# Patient Record
Sex: MALE | Race: WHITE | NOT HISPANIC OR LATINO
[De-identification: names, ages, dates, MRNs, and addresses within clinical notes are randomized per-mention and may not be internally consistent; named-entity substitution may affect disease eponyms.]

---

## 2020-01-10 PROBLEM — Z00.00 ENCOUNTER FOR PREVENTIVE HEALTH EXAMINATION: Status: ACTIVE | Noted: 2020-01-10

## 2020-01-31 ENCOUNTER — APPOINTMENT (OUTPATIENT)
Dept: NEUROLOGY | Facility: CLINIC | Age: 25
End: 2020-01-31
Payer: COMMERCIAL

## 2020-01-31 VITALS
BODY MASS INDEX: 21.47 KG/M2 | HEIGHT: 70 IN | TEMPERATURE: 98 F | HEART RATE: 80 BPM | SYSTOLIC BLOOD PRESSURE: 126 MMHG | DIASTOLIC BLOOD PRESSURE: 70 MMHG | OXYGEN SATURATION: 97 % | WEIGHT: 150 LBS

## 2020-01-31 PROCEDURE — 99205 OFFICE O/P NEW HI 60 MIN: CPT

## 2020-01-31 NOTE — HISTORY OF PRESENT ILLNESS
[FreeTextEntry1] : Reason for consult:  possible autoimmune encephalitis\par \par HPI: KATHY PAUL is a 24 year old man \par \par 2017 - was started on zoloft, became hypomanic, then came off zoloft. then developed manic episode and hospitalized. then started on seroquel and left hospital. then developed a lot of depression and "brain fog" and fatigue. Then went into outpatient residential program, was put on lithium for tuyet for several months, then stopped lithium in 11/2018 because of lack of efficacy but never raised to therapeutic level for bipolar. Eventually was tested for lyme in 5/2018, reportedly positive. Then referred to Scottie Loredo at [facility], was on 6 month course of doxycycline. Then saw Kenny Stanford in [place], had "galaxy testing" for lyme, was recommended switch to tetracycline, but instead stopped abx. Saw "lyme literate" psychiatrist at [facility] and was told testing was negative, was recommended disulfiram but pt opted against it.\par \par Started seeing Jordan Pollard at [facility]. Sent pt for tilt table test, EMG, and skin biopsy. Was told pt that he has small fiber peripheral neuropathy. However, pt did not have symptoms suggestive of neuropathy. MRI brain was reportedly normal. Also had brain PET which showed hypometabolism, SPECT was normal 2y ago. Serum AI panel was negative. Dr. Pollard recommended IVIG. Here for 2nd opinion. \par \par Never had EEG or LP. \par \par ROS/Current Sx:\par 10 point ROS reviewed and scanned\par \par PMHX:\par depression\par neuropathy\par \par MEDS:\par none\par \par ALL: nkda\par \par SHx: no tob, no etoh, no drugs. worked at [employer] company. was at [employer] up until 2017.\par \par FHx: uncle with bipolar, pat GM with suicide attempt, pat uncle who committed suicide\par \par Vitals: unrevealing\par \par Exam:\par AO3.  Normally conversant.  Follows commands, names, and repeats.  Good attention.\par PERRL, VFF, EOMI, no nystagmus, face symmetric, TUP at midline.\par Motor: \par                                                 R:                               L:\par Del                                           5                                5\par Bi                                              5                               5\par Tri                                            5                               5\par Wrist Extensors                      5                               5\par Finger abductors                    5                               5\par                                         5                               5 \par \par HF                                           5                               5\par KE                                           5                               5\par KF                                           5                               5\par DF                                           5                               5\par PF                                           5                               5\par \par Tone                                       R                               L\par UE                                          0                                0 \par LE                                          0                                0\par \par Sensory                                RUE                      LUE                 RLE                LLE     \par LT                                           +                            +                      +                   +\par Vib                                          +                            +                      +                   +\par JPS                                         +                            +                      +                   +\par PP                                         +                            +                      +                   +\par Temp                                     +                            +                      +                   +\par \par Reflexes:\par                                              R                             L                            \par Biceps                                  2                             2\par BR                                        2                             2\par Triceps                                2                              2\par Pat                                        2                            2 \par AJ                                        2                             2\par \par TOES                                    F                            F\par \par \par Coordination:\par                                              R                             L                       \par FTN                                       0                             0 \par LORIE                                      0                            0\par HTS                                      0                             0 \par \par Other                                                                          \par  \par \par Gait: normal, can heel, toe, and tandem\par \par                     Assistance: none\par \par 12/2019 per Dr. Pollard's notes\par MIQUEL 1:160\par lyme negative\par reportedly normal ssa/ssb, rf, B12, ESR, to, and wiil\par \par AP: 23yo w/ several years of depression, tuyet, and "brain fog". MRI brain reportedly nl. PET scan with hypometabolism. Serum ENS1 was negative per report. EMG and nerve bx by Dr. Pollard revealed e/o small fiber neuropathy, but pt denies any symptoms and his exam in the office is normal.\par \par I had a long conversation with pt that the definitive test to r/o AI encephalitis would be CSF analysis to check for pleocytosis and ENS1. However, I am not clear on the yield of this test, as based on his purely neuropsychiatric symptoms lasting years, I do not have high suspicion for AI encephalitis. He will think about whether he wants to undergo LP.\par \par I advised that he may follow up with Dr. Pollard to consider IVIG as Dr. Pollard had suggested. However, I cannot predict the benefit of such treatment.\par \par I urged him to seek a new psychiatry visit given his psychiatric symptoms.\par \par \par

## 2021-01-21 ENCOUNTER — TRANSCRIPTION ENCOUNTER (OUTPATIENT)
Age: 26
End: 2021-01-21

## 2022-06-08 ENCOUNTER — EMERGENCY (EMERGENCY)
Facility: HOSPITAL | Age: 27
LOS: 1 days | Discharge: HOME | End: 2022-06-08
Attending: EMERGENCY MEDICINE | Admitting: EMERGENCY MEDICINE
Payer: COMMERCIAL

## 2022-06-08 VITALS
OXYGEN SATURATION: 100 % | RESPIRATION RATE: 20 BRPM | TEMPERATURE: 98 F | DIASTOLIC BLOOD PRESSURE: 69 MMHG | SYSTOLIC BLOOD PRESSURE: 110 MMHG | HEART RATE: 89 BPM

## 2022-06-08 VITALS
SYSTOLIC BLOOD PRESSURE: 107 MMHG | DIASTOLIC BLOOD PRESSURE: 64 MMHG | WEIGHT: 130.95 LBS | OXYGEN SATURATION: 98 % | TEMPERATURE: 100 F | RESPIRATION RATE: 18 BRPM | HEART RATE: 115 BPM | HEIGHT: 70 IN

## 2022-06-08 DIAGNOSIS — K92.1 MELENA: ICD-10-CM

## 2022-06-08 DIAGNOSIS — R53.83 OTHER FATIGUE: ICD-10-CM

## 2022-06-08 DIAGNOSIS — M25.551 PAIN IN RIGHT HIP: ICD-10-CM

## 2022-06-08 DIAGNOSIS — K51.90 ULCERATIVE COLITIS, UNSPECIFIED, WITHOUT COMPLICATIONS: ICD-10-CM

## 2022-06-08 DIAGNOSIS — R05.9 COUGH, UNSPECIFIED: ICD-10-CM

## 2022-06-08 DIAGNOSIS — R00.0 TACHYCARDIA, UNSPECIFIED: ICD-10-CM

## 2022-06-08 DIAGNOSIS — Z20.822 CONTACT WITH AND (SUSPECTED) EXPOSURE TO COVID-19: ICD-10-CM

## 2022-06-08 DIAGNOSIS — M25.552 PAIN IN LEFT HIP: ICD-10-CM

## 2022-06-08 LAB
ALBUMIN SERPL ELPH-MCNC: 3.9 G/DL — SIGNIFICANT CHANGE UP (ref 3.3–5)
ALP SERPL-CCNC: 49 U/L — SIGNIFICANT CHANGE UP (ref 40–120)
ALT FLD-CCNC: 19 U/L — SIGNIFICANT CHANGE UP (ref 10–45)
ANION GAP SERPL CALC-SCNC: 10 MMOL/L — SIGNIFICANT CHANGE UP (ref 5–17)
APTT BLD: 28.9 SEC — SIGNIFICANT CHANGE UP (ref 27.5–35.5)
AST SERPL-CCNC: 16 U/L — SIGNIFICANT CHANGE UP (ref 10–40)
BASOPHILS # BLD AUTO: 0.03 K/UL — SIGNIFICANT CHANGE UP (ref 0–0.2)
BASOPHILS NFR BLD AUTO: 0.3 % — SIGNIFICANT CHANGE UP (ref 0–2)
BILIRUB SERPL-MCNC: 0.3 MG/DL — SIGNIFICANT CHANGE UP (ref 0.2–1.2)
BUN SERPL-MCNC: 7 MG/DL — SIGNIFICANT CHANGE UP (ref 7–23)
CALCIUM SERPL-MCNC: 9.4 MG/DL — SIGNIFICANT CHANGE UP (ref 8.4–10.5)
CHLORIDE SERPL-SCNC: 102 MMOL/L — SIGNIFICANT CHANGE UP (ref 96–108)
CO2 SERPL-SCNC: 27 MMOL/L — SIGNIFICANT CHANGE UP (ref 22–31)
CREAT SERPL-MCNC: 0.84 MG/DL — SIGNIFICANT CHANGE UP (ref 0.5–1.3)
EGFR: 123 ML/MIN/1.73M2 — SIGNIFICANT CHANGE UP
EOSINOPHIL # BLD AUTO: 0.25 K/UL — SIGNIFICANT CHANGE UP (ref 0–0.5)
EOSINOPHIL NFR BLD AUTO: 2.9 % — SIGNIFICANT CHANGE UP (ref 0–6)
FLUAV AG NPH QL: SIGNIFICANT CHANGE UP
FLUBV AG NPH QL: SIGNIFICANT CHANGE UP
GLUCOSE SERPL-MCNC: 107 MG/DL — HIGH (ref 70–99)
HCT VFR BLD CALC: 35.4 % — LOW (ref 39–50)
HGB BLD-MCNC: 11.5 G/DL — LOW (ref 13–17)
IMM GRANULOCYTES NFR BLD AUTO: 0.2 % — SIGNIFICANT CHANGE UP (ref 0–1.5)
INR BLD: 1.12 — SIGNIFICANT CHANGE UP (ref 0.88–1.16)
LACTATE SERPL-SCNC: 0.8 MMOL/L — SIGNIFICANT CHANGE UP (ref 0.5–2)
LYMPHOCYTES # BLD AUTO: 1.43 K/UL — SIGNIFICANT CHANGE UP (ref 1–3.3)
LYMPHOCYTES # BLD AUTO: 16.5 % — SIGNIFICANT CHANGE UP (ref 13–44)
MCHC RBC-ENTMCNC: 26.4 PG — LOW (ref 27–34)
MCHC RBC-ENTMCNC: 32.5 GM/DL — SIGNIFICANT CHANGE UP (ref 32–36)
MCV RBC AUTO: 81.4 FL — SIGNIFICANT CHANGE UP (ref 80–100)
MONOCYTES # BLD AUTO: 0.8 K/UL — SIGNIFICANT CHANGE UP (ref 0–0.9)
MONOCYTES NFR BLD AUTO: 9.2 % — SIGNIFICANT CHANGE UP (ref 2–14)
NEUTROPHILS # BLD AUTO: 6.12 K/UL — SIGNIFICANT CHANGE UP (ref 1.8–7.4)
NEUTROPHILS NFR BLD AUTO: 70.9 % — SIGNIFICANT CHANGE UP (ref 43–77)
NRBC # BLD: 0 /100 WBCS — SIGNIFICANT CHANGE UP (ref 0–0)
PLATELET # BLD AUTO: 357 K/UL — SIGNIFICANT CHANGE UP (ref 150–400)
POTASSIUM SERPL-MCNC: 4 MMOL/L — SIGNIFICANT CHANGE UP (ref 3.5–5.3)
POTASSIUM SERPL-SCNC: 4 MMOL/L — SIGNIFICANT CHANGE UP (ref 3.5–5.3)
PROT SERPL-MCNC: 7.1 G/DL — SIGNIFICANT CHANGE UP (ref 6–8.3)
PROTHROM AB SERPL-ACNC: 13.4 SEC — SIGNIFICANT CHANGE UP (ref 10.5–13.4)
RBC # BLD: 4.35 M/UL — SIGNIFICANT CHANGE UP (ref 4.2–5.8)
RBC # FLD: 11 % — SIGNIFICANT CHANGE UP (ref 10.3–14.5)
RSV RNA NPH QL NAA+NON-PROBE: SIGNIFICANT CHANGE UP
SARS-COV-2 RNA SPEC QL NAA+PROBE: SIGNIFICANT CHANGE UP
SODIUM SERPL-SCNC: 139 MMOL/L — SIGNIFICANT CHANGE UP (ref 135–145)
WBC # BLD: 8.65 K/UL — SIGNIFICANT CHANGE UP (ref 3.8–10.5)
WBC # FLD AUTO: 8.65 K/UL — SIGNIFICANT CHANGE UP (ref 3.8–10.5)

## 2022-06-08 PROCEDURE — 85025 COMPLETE CBC W/AUTO DIFF WBC: CPT

## 2022-06-08 PROCEDURE — 87637 SARSCOV2&INF A&B&RSV AMP PRB: CPT

## 2022-06-08 PROCEDURE — 73523 X-RAY EXAM HIPS BI 5/> VIEWS: CPT | Mod: 26

## 2022-06-08 PROCEDURE — 85730 THROMBOPLASTIN TIME PARTIAL: CPT

## 2022-06-08 PROCEDURE — 93005 ELECTROCARDIOGRAM TRACING: CPT

## 2022-06-08 PROCEDURE — 99285 EMERGENCY DEPT VISIT HI MDM: CPT | Mod: 25

## 2022-06-08 PROCEDURE — 87040 BLOOD CULTURE FOR BACTERIA: CPT

## 2022-06-08 PROCEDURE — 93010 ELECTROCARDIOGRAM REPORT: CPT

## 2022-06-08 PROCEDURE — 36415 COLL VENOUS BLD VENIPUNCTURE: CPT

## 2022-06-08 PROCEDURE — 80053 COMPREHEN METABOLIC PANEL: CPT

## 2022-06-08 PROCEDURE — 73523 X-RAY EXAM HIPS BI 5/> VIEWS: CPT

## 2022-06-08 PROCEDURE — 96360 HYDRATION IV INFUSION INIT: CPT

## 2022-06-08 PROCEDURE — 85610 PROTHROMBIN TIME: CPT

## 2022-06-08 PROCEDURE — 71045 X-RAY EXAM CHEST 1 VIEW: CPT | Mod: 26

## 2022-06-08 PROCEDURE — 71045 X-RAY EXAM CHEST 1 VIEW: CPT

## 2022-06-08 PROCEDURE — 83605 ASSAY OF LACTIC ACID: CPT

## 2022-06-08 RX ORDER — ACETAMINOPHEN 500 MG
650 TABLET ORAL ONCE
Refills: 0 | Status: COMPLETED | OUTPATIENT
Start: 2022-06-08 | End: 2022-06-08

## 2022-06-08 RX ORDER — SODIUM CHLORIDE 9 MG/ML
1000 INJECTION INTRAMUSCULAR; INTRAVENOUS; SUBCUTANEOUS ONCE
Refills: 0 | Status: COMPLETED | OUTPATIENT
Start: 2022-06-08 | End: 2022-06-08

## 2022-06-08 RX ADMIN — SODIUM CHLORIDE 1000 MILLILITER(S): 9 INJECTION INTRAMUSCULAR; INTRAVENOUS; SUBCUTANEOUS at 21:15

## 2022-06-08 RX ADMIN — Medication 650 MILLIGRAM(S): at 20:13

## 2022-06-08 RX ADMIN — Medication 650 MILLIGRAM(S): at 20:45

## 2022-06-08 RX ADMIN — SODIUM CHLORIDE 1000 MILLILITER(S): 9 INJECTION INTRAMUSCULAR; INTRAVENOUS; SUBCUTANEOUS at 20:13

## 2022-06-08 NOTE — ED ADULT NURSE NOTE - OBJECTIVE STATEMENT
27M, hx US (dx 1/22, started pm budesonide 2 weeks prior) presents with b/l hip pain for about a week. pain only present when weight-bearing. Endoring episodes of encopresis  over the last week/ Denies taking any medications for pain.

## 2022-06-08 NOTE — ED PROVIDER NOTE - PATIENT PORTAL LINK FT
You can access the FollowMyHealth Patient Portal offered by [facility] by registering at the following website: [email or web address]. By joining [employer]’s FollowMyHealth portal, you will also be able to view your health information using other applications (apps) compatible with our system.

## 2022-06-08 NOTE — ED ADULT TRIAGE NOTE - CHIEF COMPLAINT QUOTE
Pt w/ hx of ulcerative colitis on budocenide presents with bilateral hip pain that prevents him from walking without significant pain. Pt is on budosenide to manage 3 months of stool incontinence. Pt referred to ED by MD Soriano.

## 2022-06-08 NOTE — ED PROVIDER NOTE - NSFOLLOWUPINSTRUCTIONS_ED_ALL_ED_FT
It is unclear what exactly is causing your symptoms! It is important to continue following up with your doctor outside the hospital and to return to ER for: Persistent fever/vomiting, uncontrolled pain, worsening swelling, worsening breathing, worsening lightheaded, spreading redness.     Your COVID results are pending. Can take a few hours to a few days to result. If you are "positive," you will be contacted. You can also call ER at [phone] to get results     Can take tylenol 650mg every 6hrs as needed for pain.    Follow up with primary doctor within 1-2 days.    Follow up with your GI doctor.    Follow up with orthopedist. Can call [phone] to schedule appointment.     Follow up with rheumatologist. Can call [phone] or visit [email or web address] to schedule appointment.     Joint Pain    Joint pain (arthralgia) may be caused by many things. Joint pain is likely to go away when you follow instructions from your health care provider for relieving pain at home. However, joint pain can also be caused by conditions that require more treatment. Common causes of joint pain include:  Bruising in the area of the joint.  Injury caused by repeating certain movements too many times (overuse injury).  Age-related joint wear and tear (osteoarthritis).  Buildup of uric acid crystals in the joint (gout).  Inflammation of the joint (rheumatic disease).  Various other forms of arthritis.  Infections of the joint (septic arthritis) or of the bone (osteomyelitis).    Your health care provider may recommend that you take pain medicine or wear a supportive device like an elastic bandage, sling, or splint. If your joint pain continues, you may need lab or imaging tests to diagnose the cause of your joint pain.    Follow these instructions at home:  Managing pain, stiffness, and swelling   If directed, put ice on the painful area. Icing can help to relieve joint pain and swelling.  Put ice in a plastic bag.  Place a towel between your skin and the bag.  Leave the ice on for 20 minutes, 2–3 times a day.  If directed, apply heat to the painful area as often as told by your health care provider. Heat can reduce the stiffness of your muscles and joints. Use the heat source that your health care provider recommends, such as a moist heat pack or a heating pad.  Place a towel between your skin and the heat source.  Leave the heat on for 20–30 minutes.  Remove the heat if your skin turns bright red. This is especially important if you are unable to feel pain, heat, or cold. You may have a greater risk of getting burned.  Move your fingers or toes below the painful joint often. You can avoid stiffness and lessen swelling by doing this.  If possible, raise (elevate) the painful joint above the level of your heart while you are sitting or lying down. To do this, try putting a few pillows under the painful joint.    Activity   Rest the painful joint for as long as directed. Do not do anything that causes or worsens pain.  Begin exercising or stretching the affected area, as told by your health care provider. Ask your health care provider what types of exercise are safe for you.    If you have an elastic bandage, sling, or splint:   Wear the supportive device as told by your health care provider. Remove it only as told by your health care provider.  Loosen the device if your fingers or toes below the joint tingle, become numb, or turn cold and blue.  Keep the device clean.   Ask your health care provider if you should remove the device before bathing. You may need to cover it with a watertight covering when you take a bath or a shower.    General instructions   Take over-the-counter and prescription medicines only as told by your health care provider.  Do not use any products that contain nicotine or tobacco, such as cigarettes and e-cigarettes. If you need help quitting, ask your health care provider.  Keep all follow-up visits as told by your health care provider. This is important.  Contact a health care provider if:  You have pain that gets worse and does not get better with medicine.  Your joint pain does not improve within 3 days.  You have increased bruising or swelling.  You have a fever.  You lose 10 lb (4.5 kg) or more without trying.    Get help right away if:  You cannot move the joint.  Your fingers or toes tingle, become numb, or turn cold and blue.  You have a fever along with a joint that is red, warm, and swollen.    Summary  Joint pain (arthralgia) may be caused by many things.  Your health care provider may recommend that you take pain medicine or wear a supportive device like an elastic bandage, sling, or splint.  If your joint pain continues, you may need tests to diagnose the cause of your joint pain.  Take over-the-counter and prescription medicines only as told by your health care provider.

## 2022-06-08 NOTE — ED PROVIDER NOTE - OBJECTIVE STATEMENT
27M PMH UC (dx Jan 2022, on apriso since then, started budesonide PO ~2w ago, GI Dr. Mcgrath) p/w pain to b/l lateral hips, x8d, only w/ weight bearing. On ROS pt notes fatigue x3w. Also 1w of cough. also chronic diarrhea, ~8 episodes per day, occasional blood streaks. Has not taken anything for pain. No other systemic symptoms. Pt was trying to go to PMD today but was unable to get appt and was referred to UC who referred to ED. Was not referred to ED by Dr. Mcgrath.   Denies HA, f/c, rhinorrhea, sore throat, rashes, SOB/CP, neck/back pain, other joint pain, recent travel, sick contacts, abd pain, NV, black stool, focal weakness/numbness. Cannot recall any specific precipitating trauma.   Mom at bedside notes that for last 2-3 years pt has been seen by several neurologists and ID doctors for possible autoimmune or tick borne diseases.

## 2022-06-08 NOTE — ED PROVIDER NOTE - CLINICAL SUMMARY MEDICAL DECISION MAKING FREE TEXT BOX
27M PMH UC (dx Jan 2022, on apriso since then, started budesonide PO ~2w ago, GI Dr. Mcgrath) p/w pain to b/l lateral hips, x8d, only w/ weight bearing. On ROS pt notes fatigue x3w. Also 1w of cough. also chronic diarrhea, ~8 episodes per day, occasional blood streaks. Has not taken anything for pain. No other systemic symptoms. Pt was trying to go to PMD today but was unable to get appt and was referred to UC who referred to ED. Was not referred to ED by Dr. Mcgrath.   Mild tachycardia, 99.5 oral temp, other vitals wnl. Exam as above.  ddx: unclear etiology of hip pain - low suspicion AVN. Clinically not septic joint. Possible viral URI as well.  Labs, IVF/symptom control, XR, reassess.  discussed plan w/ pt/mom at bedside.

## 2022-06-08 NOTE — ED PROVIDER NOTE - PROGRESS NOTE DETAILS
[place]: hgb 11.5, other labs grossly wnl. CXR/XR wnl. Pain improved. Vitals improved. Remains well appearing. Given cane for comfort.   Discussed importance of outpt follow up and return precautions. Clinically no indication for further emergent ED workup or hospitalization at this time. Comfortable for dc, outpt f/u.

## 2022-06-11 ENCOUNTER — INPATIENT (INPATIENT)
Facility: HOSPITAL | Age: 27
LOS: 3 days | Discharge: HOME | DRG: 386 | End: 2022-06-15
Attending: STUDENT IN AN ORGANIZED HEALTH CARE EDUCATION/TRAINING PROGRAM | Admitting: INTERNAL MEDICINE
Payer: COMMERCIAL

## 2022-06-11 VITALS
HEIGHT: 70 IN | SYSTOLIC BLOOD PRESSURE: 104 MMHG | OXYGEN SATURATION: 98 % | TEMPERATURE: 98 F | DIASTOLIC BLOOD PRESSURE: 69 MMHG | HEART RATE: 95 BPM | WEIGHT: 130.95 LBS | RESPIRATION RATE: 17 BRPM

## 2022-06-11 DIAGNOSIS — M25.551 PAIN IN RIGHT HIP: ICD-10-CM

## 2022-06-11 DIAGNOSIS — R63.8 OTHER SYMPTOMS AND SIGNS CONCERNING FOOD AND FLUID INTAKE: ICD-10-CM

## 2022-06-11 DIAGNOSIS — F99 MENTAL DISORDER, NOT OTHERWISE SPECIFIED: ICD-10-CM

## 2022-06-11 DIAGNOSIS — K51.90 ULCERATIVE COLITIS, UNSPECIFIED, WITHOUT COMPLICATIONS: ICD-10-CM

## 2022-06-11 DIAGNOSIS — D64.9 ANEMIA, UNSPECIFIED: ICD-10-CM

## 2022-06-11 LAB
ALBUMIN SERPL ELPH-MCNC: 3.9 G/DL — SIGNIFICANT CHANGE UP (ref 3.3–5)
ALP SERPL-CCNC: 48 U/L — SIGNIFICANT CHANGE UP (ref 40–120)
ALT FLD-CCNC: 20 U/L — SIGNIFICANT CHANGE UP (ref 10–45)
ANION GAP SERPL CALC-SCNC: 10 MMOL/L — SIGNIFICANT CHANGE UP (ref 5–17)
APPEARANCE UR: CLEAR — SIGNIFICANT CHANGE UP
APTT BLD: 28.5 SEC — SIGNIFICANT CHANGE UP (ref 27.5–35.5)
AST SERPL-CCNC: 16 U/L — SIGNIFICANT CHANGE UP (ref 10–40)
BASOPHILS # BLD AUTO: 0.02 K/UL — SIGNIFICANT CHANGE UP (ref 0–0.2)
BASOPHILS NFR BLD AUTO: 0.2 % — SIGNIFICANT CHANGE UP (ref 0–2)
BILIRUB SERPL-MCNC: 0.2 MG/DL — SIGNIFICANT CHANGE UP (ref 0.2–1.2)
BILIRUB UR-MCNC: NEGATIVE — SIGNIFICANT CHANGE UP
BUN SERPL-MCNC: 9 MG/DL — SIGNIFICANT CHANGE UP (ref 7–23)
C DIFF GDH STL QL: NEGATIVE — SIGNIFICANT CHANGE UP
C DIFF GDH STL QL: SIGNIFICANT CHANGE UP
CALCIUM SERPL-MCNC: 9 MG/DL — SIGNIFICANT CHANGE UP (ref 8.4–10.5)
CHLORIDE SERPL-SCNC: 104 MMOL/L — SIGNIFICANT CHANGE UP (ref 96–108)
CK SERPL-CCNC: 35 U/L — SIGNIFICANT CHANGE UP (ref 30–200)
CO2 SERPL-SCNC: 28 MMOL/L — SIGNIFICANT CHANGE UP (ref 22–31)
COLOR SPEC: YELLOW — SIGNIFICANT CHANGE UP
CREAT SERPL-MCNC: 0.75 MG/DL — SIGNIFICANT CHANGE UP (ref 0.5–1.3)
CRP SERPL-MCNC: 26.4 MG/L — HIGH (ref 0–4)
CULTURE RESULTS: SIGNIFICANT CHANGE UP
DIFF PNL FLD: NEGATIVE — SIGNIFICANT CHANGE UP
EGFR: 128 ML/MIN/1.73M2 — SIGNIFICANT CHANGE UP
EOSINOPHIL # BLD AUTO: 0.12 K/UL — SIGNIFICANT CHANGE UP (ref 0–0.5)
EOSINOPHIL NFR BLD AUTO: 1.3 % — SIGNIFICANT CHANGE UP (ref 0–6)
ERYTHROCYTE [SEDIMENTATION RATE] IN BLOOD: 40 MM/HR — HIGH
GLUCOSE SERPL-MCNC: 110 MG/DL — HIGH (ref 70–99)
GLUCOSE UR QL: NEGATIVE — SIGNIFICANT CHANGE UP
HCT VFR BLD CALC: 34.1 % — LOW (ref 39–50)
HGB BLD-MCNC: 11.1 G/DL — LOW (ref 13–17)
IMM GRANULOCYTES NFR BLD AUTO: 0.4 % — SIGNIFICANT CHANGE UP (ref 0–1.5)
INR BLD: 1.13 — SIGNIFICANT CHANGE UP (ref 0.88–1.16)
KETONES UR-MCNC: NEGATIVE — SIGNIFICANT CHANGE UP
LEUKOCYTE ESTERASE UR-ACNC: NEGATIVE — SIGNIFICANT CHANGE UP
LYMPHOCYTES # BLD AUTO: 0.96 K/UL — LOW (ref 1–3.3)
LYMPHOCYTES # BLD AUTO: 10.4 % — LOW (ref 13–44)
MAGNESIUM SERPL-MCNC: 1.8 MG/DL — SIGNIFICANT CHANGE UP (ref 1.6–2.6)
MCHC RBC-ENTMCNC: 26.6 PG — LOW (ref 27–34)
MCHC RBC-ENTMCNC: 32.6 GM/DL — SIGNIFICANT CHANGE UP (ref 32–36)
MCV RBC AUTO: 81.8 FL — SIGNIFICANT CHANGE UP (ref 80–100)
MONOCYTES # BLD AUTO: 0.53 K/UL — SIGNIFICANT CHANGE UP (ref 0–0.9)
MONOCYTES NFR BLD AUTO: 5.7 % — SIGNIFICANT CHANGE UP (ref 2–14)
NEUTROPHILS # BLD AUTO: 7.59 K/UL — HIGH (ref 1.8–7.4)
NEUTROPHILS NFR BLD AUTO: 82 % — HIGH (ref 43–77)
NITRITE UR-MCNC: NEGATIVE — SIGNIFICANT CHANGE UP
NRBC # BLD: 0 /100 WBCS — SIGNIFICANT CHANGE UP (ref 0–0)
PH UR: 6 — SIGNIFICANT CHANGE UP (ref 5–8)
PLATELET # BLD AUTO: 414 K/UL — HIGH (ref 150–400)
POTASSIUM SERPL-MCNC: 3.8 MMOL/L — SIGNIFICANT CHANGE UP (ref 3.5–5.3)
POTASSIUM SERPL-SCNC: 3.8 MMOL/L — SIGNIFICANT CHANGE UP (ref 3.5–5.3)
PROT SERPL-MCNC: 6.9 G/DL — SIGNIFICANT CHANGE UP (ref 6–8.3)
PROT UR-MCNC: NEGATIVE MG/DL — SIGNIFICANT CHANGE UP
PROTHROM AB SERPL-ACNC: 13.5 SEC — HIGH (ref 10.5–13.4)
RBC # BLD: 4.17 M/UL — LOW (ref 4.2–5.8)
RBC # FLD: 11.3 % — SIGNIFICANT CHANGE UP (ref 10.3–14.5)
SARS-COV-2 RNA SPEC QL NAA+PROBE: NEGATIVE — SIGNIFICANT CHANGE UP
SODIUM SERPL-SCNC: 142 MMOL/L — SIGNIFICANT CHANGE UP (ref 135–145)
SP GR SPEC: >=1.03 — SIGNIFICANT CHANGE UP (ref 1–1.03)
SPECIMEN SOURCE: SIGNIFICANT CHANGE UP
UROBILINOGEN FLD QL: 0.2 E.U./DL — SIGNIFICANT CHANGE UP
WBC # BLD: 9.26 K/UL — SIGNIFICANT CHANGE UP (ref 3.8–10.5)
WBC # FLD AUTO: 9.26 K/UL — SIGNIFICANT CHANGE UP (ref 3.8–10.5)

## 2022-06-11 PROCEDURE — 74177 CT ABD & PELVIS W/CONTRAST: CPT | Mod: 26

## 2022-06-11 PROCEDURE — 99285 EMERGENCY DEPT VISIT HI MDM: CPT

## 2022-06-11 PROCEDURE — 99223 1ST HOSP IP/OBS HIGH 75: CPT | Mod: GC

## 2022-06-11 PROCEDURE — 99222 1ST HOSP IP/OBS MODERATE 55: CPT

## 2022-06-11 RX ORDER — SODIUM CHLORIDE 9 MG/ML
1000 INJECTION, SOLUTION INTRAVENOUS
Refills: 0 | Status: DISCONTINUED | OUTPATIENT
Start: 2022-06-11 | End: 2022-06-13

## 2022-06-11 RX ORDER — ACETAMINOPHEN 500 MG
650 TABLET ORAL EVERY 6 HOURS
Refills: 0 | Status: DISCONTINUED | OUTPATIENT
Start: 2022-06-11 | End: 2022-06-15

## 2022-06-11 RX ORDER — SODIUM CHLORIDE 9 MG/ML
1000 INJECTION INTRAMUSCULAR; INTRAVENOUS; SUBCUTANEOUS ONCE
Refills: 0 | Status: COMPLETED | OUTPATIENT
Start: 2022-06-11 | End: 2022-06-11

## 2022-06-11 RX ADMIN — SODIUM CHLORIDE 1000 MILLILITER(S): 9 INJECTION INTRAMUSCULAR; INTRAVENOUS; SUBCUTANEOUS at 16:46

## 2022-06-11 RX ADMIN — SODIUM CHLORIDE 90 MILLILITER(S): 9 INJECTION, SOLUTION INTRAVENOUS at 23:47

## 2022-06-11 NOTE — H&P ADULT - ASSESSMENT
27M PMH UC diagnosed 01/2022, unclear psychiatric disorder (follows with psych), and chronic inflammatory demyelinating polyneuritis presenting with chronic diarrhea, recent R hip pain, and fecal incontinence admitted for UC flare and hip pain workup.     Pt has had a little bit of nausea but able to tolerate PO. Pt otherwise denies fever, chills, chest pain, sob, numbness or tingling of the extremities, focal neurologic deficits.     ED Course:  VS: T 98.1, HR 95, /69, O2 98%  Labs s/f: Hb 11.1, CRP 26.4, ESR 40  Treatment: 1L NS, GI and neuro consulted   27M PMH UC diagnosed 01/2022, unclear psychiatric disorder (follows with psych), and chronic inflammatory demyelinating polyneuritis presenting with chronic diarrhea, recent R hip pain, and fecal incontinence admitted for UC flare and hip pain workup.

## 2022-06-11 NOTE — H&P ADULT - NSICDXPASTMEDICALHX_GEN_ALL_CORE_FT
PAST MEDICAL HISTORY:  Nutrition, metabolism, and development symptoms     Psychiatric disorder     Ulcerative colitis

## 2022-06-11 NOTE — H&P ADULT - PROBLEM SELECTOR PLAN 1
Hx of chronic diarrhea from ulcerative colitis diagnosed via colonoscopy in 01/2022.   - Follows with Dr. Soriano as an outpatient.   - Has been on mesalamine in the past that has mildly relieved diarrhea but still has 8 BMs per day.   - One month ago, pt started to have issues with fecal urge incontinence for which GI started him on budesonide.  - GI consulted in ED  Plan:  - f/u GI recs  - will likely need IV steroids, but will need to f/u with psychiatrist due to risk of delirium  - regular diet for now  - f/u C diff  - fecal incontinence appears to be more consistent with urge incontinence. low s/f cord compression given lack of other neuro deficits Hx of chronic diarrhea from ulcerative colitis diagnosed via colonoscopy in 01/2022.   - Follows with Dr. Soriano as an outpatient.   - Has been on mesalamine in the past that has mildly relieved diarrhea but still has 8 BMs per day.   - One month ago, pt started to have issues with fecal urge incontinence for which GI started him on budesonide.  - GI consulted in ED  Plan:  - f/u GI recs  - will likely need IV steroids, but will need to f/u with psychiatrist due to risk of delirium  - regular diet for now  - f/u C diff  - f/u CT AP  - fecal incontinence appears to be more consistent with urge incontinence. low s/f cord compression given lack of other neuro deficits Hx of chronic diarrhea from ulcerative colitis diagnosed via colonoscopy in 01/2022.   - Follows with Dr. Soriano as an outpatient.   - Has been on mesalamine in the past that has mildly relieved diarrhea but still has 8 BMs per day.   - One month ago, pt started to have issues with fecal urge incontinence for which GI started him on budesonide.  - GI consulted in ED  - reportedly has been off and on antibiotics for concern for chronic lyme disease  Plan:  - f/u GI recs  - will likely need IV steroids, will monitor for worsening of psychiatric illness  - regular diet for now  - f/u C diff  - f/u CT AP  - fecal incontinence appears to be more consistent with urge incontinence. low s/f cord compression given lack of other neuro deficits Hx of chronic diarrhea from ulcerative colitis diagnosed via colonoscopy in 01/2022.   - Follows with Dr. Soriano as an outpatient.   - Has been on mesalamine in the past that has mildly relieved diarrhea but still has 8 BMs per day.   - One month ago, pt started to have issues with fecal urge incontinence for which GI started him on budesonide.  - GI consulted in ED  - reportedly has been off and on antibiotics for concern for chronic lyme disease  Plan:  - f/u GI recs  - will likely need IV steroids, will monitor for worsening of psychiatric illness  - regular diet for now  - f/u C diff, GI PCR  - f/u CT AP  - fecal incontinence appears to be more consistent with urge incontinence. low s/f cord compression given lack of other neuro deficits

## 2022-06-11 NOTE — CONSULT NOTE ADULT - ATTENDING COMMENTS
I was physically present for the key portions of the evaluation and managemnent (E/M) service provided.  I agree with the above history, physical, and plan which I have reviewed and edited where appropriate, with the exceptions as per my note.    pt seen and examined on 6/12/22    9d of R hip/buttocks pain. unclear etiology. some radiation down the leg slightly but mostly local. if he moves either leg, the R hip/buttock hurts. no current pain to palpation. severe pain.     neuro exam demonstrates at least 4+ in the R hip and at least 5- in the L hip, normal foot strength BL. Otherwise intact. Sensation intact to LT, cold, and VBS. Reflexes are normal throughout and are symmetric.    AP: likely local hip/buttock pathology, ? related to crohn's or abx use. Not likely a neurological process. unlikely to be radicular given absence of substantial radiation with most of it local pain, pain upon moving the leg, normal reflexes, and absence of sensory changes and definite motor changes. recommend an evaluation by primary team for local etiology of pain. if cannot find etiology, an MRI of L spine to r/o radiculopathy is not unreasonable if there is no other explanation, but I deem it much less likely.
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 Declined recommendation to start a biologic as well as prednisone outpatient due to concern for possibility would worsen psychiatric illness.  - Currently he is having ~10BM/ day with incontinence and streaks of blood.   - CRP 26  - CT: circumferential contiguous wall thickening involving entire colon, fluid filled colon c/w diarrheal illness    Recommendations:  - Daily cbc, cmp, crp  - F/u GI PCR and CDiff  - Solumedrol 20mg IV q8 if infectious w/u negative and patient agreeable  - Vit D/ Calcium while on steroids  - DVT ppx

## 2022-06-11 NOTE — ED PROVIDER NOTE - PHYSICAL EXAMINATION
GEN: Thin, well developed, awake, alert, oriented to person, place, time/situation and in no apparent distress. NTAF  ENT: Airway patent, Nasal mucosa clear. Mouth with normal mucosa.  EYES: Clear bilaterally. PERRL, EOMI  RESPIRATORY: Breathing comfortably with normal RR. No W/C/R, no hypoxia or resp distress.  CARDIAC: Regular rate and rhythm, no M/R/G  ABDOMEN: Soft, nontender, +bowel sounds, no rebound, rigidity, or guarding.  MSK: Pt unable to range right hip 2/2 pain, TTP to right Range of motion is not limited, no deformities noted.  NEURO: Alert and oriented, no focal deficits.  SKIN: Skin normal color for race, warm, dry and intact. No evidence of rash.  PSYCH: Alert and oriented to person, place, time/situation. normal mood and affect. no apparent risk to self or others.

## 2022-06-11 NOTE — CONSULT NOTE ADULT - ASSESSMENT
27M with a PMHx UC (dx January 2022 by Dr. Mcgrath, pt reluctant to take meds but currently on mesalamine and budesonide (9mg, started 2.5 weeks ago), depression, bipolar, "chronic lyme" (diagnosed 2019 after pt had 1.5 yrs of brain fog and fatigue, treated with 6 month course of doxycycline),  who p/w 3.5 months of worsening diarrhea, multiple episodes per day with blood and mucus (Stool studies sent by Dr. Mcgrath yesterday not resulted yet), as well as 9 days of worsening right buttock pain and generalized weakness. Neurology consulted to evaluate R buttock pain.    #R buttock pain  Symptoms and exam are inconsistent with neuropathy or radiculopathy. Pt with no recent trauma or overuse, reported pain is localized without radiation or associated numbness, paresthesia, weakness. Pt has full range of motion limited only by pain.   Recommendations:  -Obtain MRI of R pelvis  -Consider Ortho consult for further workup of musculoskeletal pathology    Recommendations discussed with Dr. Miller
27M PMH UC diagnosed 01/2022 (currently on mesalamine + budesonide 9), unclear psychiatric disorder (follows with psych), Lyme disease (treated with prolonged doxy), chronic inflammatory demyelinating polyneuritis presenting with chronic diarrhea, incontinence, and recent R hip pain, admitted for UC flare and hip pain workup.     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 Declined recommendation to start a biologic as well as prednisone outpatient due to concern for possibility would worsen psychiatric illness.  - Currently he is having ~10BM/ day with incontinence and streaks of blood.   - CRP 26  - CT: circumferential contiguous wall thickening involving entire colon, fluid filled colon c/w diarrheal illness    Recommendations:  - Daily cbc, cmp, crp  - F/u GI PCR and CDiff  - Solumedrol 20mg IV q8 if infectious w/u negative and patient agreeable  - Vit D/ Calcium while on steroids  - DVT ppx    Case d/w Dr. Smith, IBD fellow and primary team    Maddie Barlow MD  Gastroenterology Fellow, PGY -4   Pager [phone]  [phone]

## 2022-06-11 NOTE — ED ADULT NURSE NOTE - OBJECTIVE STATEMENT
27y Male c/o diarrhea. Pt reports chronic diarrhea, bl hip pain, ambulatory with cane, and fecal incontinence. Pt sent by MD Soriano for evaluation and admission. Los at this ED two days ago for similar c/c. Pmhx UC, depression, OCD. Pt denies CP, SOB, N/V, F/C.

## 2022-06-11 NOTE — H&P ADULT - PROBLEM SELECTOR PLAN 2
9 days ago, pt had sudden onset R hip pain that occurred at rest. has caused difficulty ambulating  - no recent trauma  - mildly tender on exam with pain on passive flexion of the R hip past 90 degrees  - no numbness or tingling in the LEs  - negative R hip radiograph from ED visit 2 days prior to admission  - neuro consulted in the ED and believe more likely musculoskeletal   - unclear etiology at this time  Plan:  - f/u MR pelvis per neuro recs  - consider ortho consult pending MR results  - tylenol for mild pain for now 9 days ago, pt had sudden onset R hip pain that occurred at rest. has caused difficulty ambulating  - no recent trauma  - mildly tender on exam with pain on passive flexion of the R hip past 90 degrees  - no numbness or tingling in the LEs  - negative R hip radiograph from ED visit 2 days prior to admission  - neuro consulted in the ED and believe more likely musculoskeletal   - unclear etiology at this time  Plan:  - f/u MR pelvis per neuro recs  - consider ortho consult pending MR results  - tylenol for mild pain for now  - PT consult after MRI 9 days ago, pt had sudden onset R hip pain that occurred at rest. has caused difficulty ambulating  - no recent trauma  - mildly tender on exam with pain on passive flexion of the R hip past 90 degrees  - no numbness or tingling in the LEs  - negative R hip radiograph from ED visit 2 days prior to admission  - neuro consulted in the ED and believe more likely musculoskeletal   Plan:  - unclear etiology at this time, could be reactive arthritis given hx of IBD  - f/u MR pelvis per neuro recs  - consider ortho consult pending MR results  - tylenol for mild pain for now  - PT consult after MRI 9 days ago, pt had sudden onset R hip pain that occurred at rest. has caused difficulty ambulating  - no recent trauma  - mildly tender on exam with pain on passive flexion of the R hip past 90 degrees  - no numbness or tingling in the LEs  - negative R hip radiograph from ED visit 2 days prior to admission  - neuro consulted in the ED and believe more likely musculoskeletal   Plan:  - unclear etiology at this time, could be reactive arthritis given hx of IBD  - f/u CT bony pelvis and consider MR pelvis pending results   - consider ortho consult pending imaging results  - tylenol for mild pain for now  - PT consult after MRI 9 days ago, pt had sudden onset R hip pain that occurred at rest. has caused difficulty ambulating  - no recent trauma  - mildly tender on exam with pain on passive flexion of the R hip past 90 degrees  - no numbness or tingling in the LEs  - negative R hip radiograph from ED visit 2 days prior to admission  - neuro consulted in the ED and believe more likely musculoskeletal   Plan:  - unclear etiology at this time, could be reactive arthritis given hx of IBD or AVN in the setting of steroid use  - f/u CT pelvis   - f/u MR pelvis and MR lumbar pending results   - consider ortho consult pending imaging results  - tylenol for mild pain for now  - PT consult after MRI

## 2022-06-11 NOTE — CONSULT NOTE ADULT - SUBJECTIVE AND OBJECTIVE BOX
27M with a PMHx UC (dx January 2022 by Dr. Mcgrath, pt reluctant to take meds but currently on mesalamine and budesonide (9mg, started 2.5 weeks ago), depression, bipolar, "chronic lyme" (diagnosed 2019 after pt had 1.5 yrs of brain fog and fatigue, treated with 6 month course of doxycycline),  who p/w 3.5 months of worsening diarrhea, multiple episodes per day with blood and mucus (Stool studies sent by Dr. Mcgrath yesterday not resulted yet), as well as 9 days of worsening right buttock pain and generalized weakness. Pain has been getting worse to the point that pt now has to ambulate with the assistance of a cane. Pt presented to the ED with pain, and was discharged home as hip xrays showed no evidence of acute fracture, dislocation or lesion. Pt describes pain as shooting, 8/10 pain that worsens with applied pressure, localized to the area of the R buttock, with no radiation to back or down the leg. Symptoms alleviated by Tylenol. No paresthesias, numbness, weakness, bladder incontinence, saddle anesthesia, Reports fecal incontinence/diarrhea because he is unable to get to bathroom in time. No recent trauma or fall or overuse, pt does not play sports and is predominantly sedentary.     Allergies    No Known Allergies    Intolerances        MEDICATIONS  (STANDING):    MEDICATIONS  (PRN):      PAST MEDICAL & SURGICAL HISTORY:      FAMILY HISTORY:      SOCIAL HISTORY: No EtOH, no tobacco, no illicit drugs    REVIEW OF SYSTEMS:    CONSTITUTIONAL: No weakness, fevers or chills  EYES/ENT: No visual changes;  No vertigo or throat pain   NECK: No pain or stiffness  RESPIRATORY: No cough, wheezing, hemoptysis; No shortness of breath  CARDIOVASCULAR: No chest pain or palpitations  GASTROINTESTINAL: +abdominal pain. No nausea, vomiting, or hematemesis;+diarrhea, No constipation. No melena, +hematochezia.  GENITOURINARY: No dysuria, frequency or hematuria  MUSCULOSKELETAL: +R buttock pain  NEUROLOGICAL: No numbness or weakness  SKIN: No itching, burning, rashes, or lesions   All other review of systems is negative unless indicated above.    Height (cm): 177.8 (06-11 @ 15:17)  Weight (kg): 59.4 (06-11 @ 15:17)  BMI (kg/m2): 18.8 (06-11 @ 15:17)  BSA (m2): 1.74 (06-11 @ 15:17)    T(F): 98.1 (06-11-22 @ 15:17), Max: 98.1 (06-11-22 @ 15:17)  HR: 95 (06-11-22 @ 15:17)  BP: 104/69 (06-11-22 @ 15:17)  RR: 17 (06-11-22 @ 15:17)  SpO2: 98% (06-11-22 @ 15:17)  Wt(kg): --    GENERAL: NAD, young male lying in bed  HEAD:  Atraumatic, Normocephalic  EYES: EOMI, PERRLA, conjunctiva and sclera clear  NECK: Supple, No JVD  CHEST/LUNG: Clear to auscultation bilaterally; No wheeze  HEART: Regular rate and rhythm; No murmurs, rubs, or gallops  ABDOMEN: Soft, Nontender, Nondistended; Bowel sounds present  BACK: no point tenderness throughout spine  EXTREMITIES:  2+ Peripheral Pulses, No clubbing, cyanosis, or edema  SKIN: No rashes or lesions  NEUROLOGY: AAOx3, Cranial nerves intact, strength 5/5 in upper extremities, full ROM in b/l LE, however effort on R is limited by pain, can lift RLE against gravity, and provide resistance, able to move laterally, hip abduction/adduction intact, sensation intact throughout, proprioception intact, reflexes symmetric, no muscular atrophy. Gait not assessed.                            11.1   9.26  )-----------( [id number]      ( 11 Jun 2022 17:07 )             34.1       06-11    142  |  104  |  9   ----------------------------<  110<H>  3.8   |  28  |  0.75    Ca    9.0      11 Jun 2022 17:07  Mg     1.8     06-11    TPro  6.9  /  Alb  3.9  /  TBili  0.2  /  DBili  x   /  AST  16  /  ALT  20  /  AlkPhos  48  06-11      Magnesium, Serum: 1.8 mg/dL (06-11 @ 17:07)      
GASTROENTEROLOGY CONSULT NOTE  HPI:  27M PMH UC diagnosed 01/2022,, unclear psychiatric disorder (follows with psych), and chronic inflammatory demyelinating polyneuritis presenting with uncontrolled UC and R hip pain. Pt follows with Dr. Smith as an outpatient. Colonoscopy 4-6 mo ago with proctosigmoid colitis. Started on mesalamine po and suppository with continued symptoms of diarrhea, rectal bleeding and urgency. He was recommended to start a biologic but declined. Two weeks ago he was started on budesonide with some improvement in symptoms; however, pt and mother declined prednisone for fear it would exacerbated psychiatric symptoms. After starting budesonide he briefly stopped mesalamine but currently is taking both. Currently he is having ~10BM/ day with incontinence and streaks of blood. Denies abd pain, f/c/v. But is having nausea.    9 days ago, pt had sudden onset R hip pain. Pain first occurred while he was sitting and pt denies recent trauma. Pain is so bad that he has had difficulty ambulating and this has caused him to not be able to make it to the restroom in time, causing him to soil himself. Occasionally radiates down the leg, but mostly complains of stabbing and throbbing pain in the R gluteal region. Worse with walking and applying pressure to the area, but can also occur while sitting still. Was in ED 2 days ago with negative R hip radiograph and discharged home. Pt has had a little bit of nausea but able to tolerate PO. Pt otherwise denies fever, chills, chest pain, sob, numbness or tingling of the extremities, focal neurologic deficits.     Allergies    Seroquel (Other)    Intolerances      Home Medications:  budesonide 3 mg oral capsule, extended release:  (11 Jun 2022 18:50)  mesalamine 0.375 g oral capsule, extended release: 4 cap(s) orally once a day (in the morning) (11 Jun 2022 18:51)  mesalamine 4 g/60 mL rectal enema: 60 milliliter(s) rectal once a day (at bedtime) (11 Jun 2022 18:51)    MEDICATIONS:  MEDICATIONS  (STANDING):    MEDICATIONS  (PRN):  acetaminophen     Tablet .. 650 milliGRAM(s) Oral every 6 hours PRN Mild Pain (1 - 3)    PAST MEDICAL & SURGICAL HISTORY:  Ulcerative colitis      Psychiatric disorder        FAMILY HISTORY:    SOCIAL HISTORY:  Tobacco: [ ] Current, [ ] Former, [ ] Never; Pack Years:  Alcohol:  Illicit Drugs:    REVIEW OF SYSTEMS:  CONSTITUTIONAL: No weakness, fevers or chills  HEENT: No visual changes; No vertigo or throat pain   NECK: No pain or stiffness  RESPIRATORY: No cough, wheezing, hemoptysis; No shortness of breath  CARDIOVASCULAR: No chest pain or palpitations  GASTROINTESTINAL: As above.  GENITOURINARY: No dysuria, frequency or hematuria  NEUROLOGICAL: No numbness or weakness  SKIN: No itching, burning, rashes, or lesions   All other 10 review of systems is negative unless indicated above.    Vital Signs Last 24 Hrs  T(C): 37.3 (11 Jun 2022 20:28), Max: 37.3 (11 Jun 2022 20:28)  T(F): 99.1 (11 Jun 2022 20:28), Max: 99.1 (11 Jun 2022 20:28)  HR: 78 (11 Jun 2022 20:28) (78 - 95)  BP: 114/67 (11 Jun 2022 20:28) (104/69 - 114/67)  BP(mean): --  RR: 18 (11 Jun 2022 20:28) (17 - 18)  SpO2: 98% (11 Jun 2022 20:28) (98% - 98%)      PHYSICAL EXAM:    General: in no acute distress  Eyes: Anicteric sclerae, moist conjunctivae  HENT: Moist mucous membranes  Neck: Trachea midline, supple  Lungs: Normal respiratory effort, no intercostal retractions  Cardiovascular: RRR  Abdomen: Soft, non-tender non-distended; No rebound or guarding  Extremities: Normal range of motion, No clubbing, cyanosis or edema. No joint swelling.  Neurological: Alert and oriented x3  Skin: Warm and dry. No obvious rash    LABS:                        11.1   9.26  )-----------( 414      ( 11 Jun 2022 17:07 )             34.1     06-11    142  |  104  |  9   ----------------------------<  110<H>  3.8   |  28  |  0.75    Ca    9.0      11 Jun 2022 17:07  Mg     1.8     06-11    TPro  6.9  /  Alb  3.9  /  TBili  0.2  /  DBili  x   /  AST  16  /  ALT  20  /  AlkPhos  48  06-11        PT/INR - ( 11 Jun 2022 17:07 )   PT: 13.5 sec;   INR: 1.13          PTT - ( 11 Jun 2022 17:07 )  PTT:28.5 sec    Urinalysis with Rflx Culture (collected 11 Jun 2022 17:18)      RADIOLOGY & ADDITIONAL STUDIES:     Reviewed

## 2022-06-11 NOTE — PATIENT PROFILE ADULT - FALL HARM RISK - HARM RISK INTERVENTIONS

## 2022-06-11 NOTE — ED ADULT TRIAGE NOTE - CHIEF COMPLAINT QUOTE
"I have UC and I was here before but my doctor told me I have to be admitted because I have not stopped having diarrhea , nausea and I still can't walk".

## 2022-06-11 NOTE — H&P ADULT - NSHPPHYSICALEXAM_GEN_ALL_CORE
VITAL SIGNS:  T(C): 36.7 (06-11-22 @ 15:17), Max: 36.7 (06-11-22 @ 15:17)  T(F): 98.1 (06-11-22 @ 15:17), Max: 98.1 (06-11-22 @ 15:17)  HR: 95 (06-11-22 @ 15:17) (95 - 95)  BP: 104/69 (06-11-22 @ 15:17) (104/69 - 104/69)  BP(mean): --  RR: 17 (06-11-22 @ 15:17) (17 - 17)  SpO2: 98% (06-11-22 @ 15:17) (98% - 98%)  Wt(kg): --    PHYSICAL EXAM:    Constitutional: WDWN resting comfortably in bed; NAD  ENT: dry mucous membranes  Neck: supple  Respiratory: CTA B/L; no W/R/R, no retractions  Cardiac: +S1/S2; RRR; no M/R/G  Gastrointestinal: soft, NT/ND; no rebound or guarding  Extremities: WWP, no clubbing or cyanosis; no peripheral edema  Musculoskeletal: pain with passive flexion of the R hip past 90 degrees  Vascular: 2+ radial, DP pulses B/L  Neurologic: AAOx3; CNII-XII grossly intact; no focal deficits  Psychiatric: affect and characteristics of appearance, verbalizations, behaviors are appropriate

## 2022-06-11 NOTE — ED PROVIDER NOTE - CLINICAL SUMMARY MEDICAL DECISION MAKING FREE TEXT BOX
27M with a h/o UC (dx January 2022 by Dr. Soriano, pt reluctant to take meds but currently on mesalamine and budesomide), depression, bipolar, ?lyme's in the past (treated with long course of doxycycline), ?hx of autoimmune encephalitis, dx of small fiber neuropathy by neurologist in the past (pt did not have neuropathy sx at that time), who p/w 3.5 months of worsening diarrhea, multiple episodes per day with blood and mucus (Stool studies sent by Dr. Soriano yesterday not resulted yet), as well as 9 days of worsening right posterior hip pain and generalized weakness. Hip pain has been getting worse to the point where he has to use a cane to walk, and has been having fecal incontinence because he can't get to the bathroom in time. No urinary dysfunction, denies f/c, nausea or vomits, no back, or abdominal pain. No recent trauma or fall. Pt was seen two days ago and had negative labs and XR hip however sx getting worse so he was told to come to the ER for admission, GI consult and neuro consult.  Labs, CT a/p and Ct pelvis order for further eval.   I d/w Dr. Soriano, GI fellow and neuro consult resident who will see the patient and provide recs.   Pt stable for admission to [facility] for further workup and mgmt. 27M with a h/o UC (dx January 2022 by Dr. Soriano, pt reluctant to take meds but currently on mesalamine and budesomide), depression, bipolar, ?lyme's in the past (treated with long course of doxycycline), ?hx of autoimmune encephalitis, dx of small fiber neuropathy by neurologist in the past (pt did not have neuropathy sx at that time), who p/w 3.5 months of worsening diarrhea, multiple episodes per day with blood and mucus (Stool studies sent by Dr. Soriano yesterday not resulted yet), as well as 9 days of worsening right posterior hip pain and generalized weakness. Hip pain has been getting worse to the point where he has to use a cane to walk, and has been having fecal incontinence because he can't get to the bathroom in time. No urinary dysfunction, no numbness or tingling to RLE, denies f/c, nausea or vomits, no back, or abdominal pain. No recent trauma or fall. Pt was seen two days ago and had negative labs and XR hip however sx getting worse so he was told to come to the ER for admission, GI consult and neuro consult.  Labs, CT a/p order for further eval.   I d/w Dr. Soriano, GI fellow and neuro consult resident who will see the patient and provide recs.   Pt stable for admission to [facility] for further workup and mgmt.

## 2022-06-11 NOTE — H&P ADULT - HISTORY OF PRESENT ILLNESS
27M PMH UC diagnosed 01/2022, unclear psychiatric disorder (follows with psych), and chronic inflammatory demyelinating polyneuritis presenting with chronic diarrhea, recent R hip pain, and fecal incontinence. Pt has had chronic diarrhea from ulcerative colitis diagnosed via colonoscopy in 01/2022. Follows with Dr. Soriano as an outpatient. Has been on mesalamine in the past that has mildly relieved diarrhea but still has 8 BMs per day. One month ago, pt started to have issues with fecal urge incontinence for which GI started him on budesonide. Then, 9 days ago, pt had sudden onset R hip pain. Pain first occurred while he was sitting and pt denies recent trauma. Pain is so bad that he has had difficulty ambulating and this has caused him to not be able to make it to the restroom in time, causing him to soil himself. Occasionally radiates down the leg, but mostly complains of stabbing and throbbing pain in the R gluteal region. Worse with walking and applying pressure to the area, but can also occur while sitting still. Was in ED 2 days ago with negative R hip radiograph and discharged home. Pt has had a little bit of nausea but able to tolerate PO. Pt otherwise denies fever, chills, chest pain, sob, numbness or tingling of the extremities, focal neurologic deficits.     ED Course:  VS: T 98.1, HR 95, /69, O2 98%  Labs s/f: Hb 11.1, CRP 26.4, ESR 40  Treatment: 1L NS, GI and neuro consulted

## 2022-06-11 NOTE — H&P ADULT - PROBLEM SELECTOR PLAN 3
Hb 11.1 on admission with borderline low MCV  - could be component of AoCD or BRIJESH given UC  Plan:  - f/u Fe studies  - maintain active T+S  - monitor daily CBC

## 2022-06-11 NOTE — ED PROVIDER NOTE - OBJECTIVE STATEMENT
27M with a h/o UC (dx January 2022 by Dr. Soriano, pt reluctant to take meds but currently on mesalamine and budesomide), depression, bipolar, ?lyme's in the past (treated with long course of doxycycline), ?hx of autoimmune encephalitis, dx of small fiber neuropathy by neurologist in the past (pt did not have neuropathy sx at that time), who p/w 3.5 months of worsening diarrhea, multiple episodes per day with blood and mucus (Stool studies sent by Dr. Soriano yesterday not resulted yet), as well as 9 days of worsening right posterior hip pain and generalized weakness. Hip pain has been getting worse to the point where he has to use a cane to walk, and has been having fecal incontinence because he can't get to the bathroom in time. No urinary dysfunction, denies f/c, nausea or vomits, no back, or abdominal pain. No recent trauma or fall. Pt was seen two days ago and had negative labs and XR hip however sx getting worse so he was told to come to the ER for admission, GI consult and neuro consult.

## 2022-06-11 NOTE — H&P ADULT - ATTENDING COMMENTS
#UC flare: p/w multiple episodes of diarhea, afebrile/no leukocytosis, low suspicion for infxn- f/up GI pcr, cdiff, stool cx, Plan for IC steroids if neg per GI.      #R hip pain: w/ associated weakness in setting of pain; BMs described as urge incomitance given continuous diarrhea. +tenderness right hip, +weakness in setting of pain. NO numbness/ saddle anesthesia, no back pain. CTAP neg for bony pathology, no joint effusions. Mild esr/crp. Low suspicion for spinal etiology. Diff dx MSK related vs ?sacroiliitis  vs axial spondylitis. Neuro following, rec further imaging of hip, f/up urgent MRI lumbar spine and HIp- ortho consult pending imaging

## 2022-06-11 NOTE — H&P ADULT - PROBLEM SELECTOR PLAN 4
Pt with hx of psychiatric disorder with episodes of rage. Pt says that rage normally is exhibited towards his mother who is his primary care giver. In the ED, pt was pleasant to provider but was often short with his mother.  - psychiatric hospitalizations in the past c/w tuyet  Plan:  - will talk to o/p psychiatrist Dr. Yifan Sharma about starting IV steroids  - will avoid antipsychotics given hx of tardive dyskinesia  - if agitated during stay, can try ativan or benadryl Pt with hx of psychiatric disorder with episodes of rage. Has been called BPD in the past but mother says that different psychiatrists have not been able to agree on a diagnosis and that there may be an infectious etiology. Reports that symptoms of tuyet and rage have appeared to be well-controlled with last psychiatric hospitalization in 2017. Pt says that rage normally is exhibited towards his mother who is his primary care giver. In the ED, pt was pleasant to provider but was often short with his mother.  Plan:  - currently denies SI/HI or other issues with mood  - will talk to o/p psychiatrist Dr. Yifan Sharma about starting IV steroids  - will avoid antipsychotics given hx of tardive dyskinesia  - if agitated during stay, can try ativan or benadryl Pt with hx of psychiatric disorder with episodes of rage. Has been called BPD in the past but mother says that different psychiatrists have not been able to agree on a diagnosis and that there may be from Lyme or autoimmune etiology. Reports that symptoms of tuyet and rage have appeared to be well-controlled with last psychiatric hospitalization in 2017. Pt says that rage normally is exhibited towards his mother who is his primary care giver. In the ED, pt was pleasant to provider but was often short with his mother.  - per pt's former psychiatrist, pt has rage issues with delusions of mother causing his medical issues. psychiatrist has signed off due to unhealthy relationship between mother and patient.   - rage outbursts tend to be more verbal than physical  - refuses psych meds  Plan:  - currently denies SI/HI or other issues with mood  - will avoid antipsychotics given hx of tardive dyskinesia  - if agitated during stay, can try ativan or benadryl but if not working former psychiatrist says that short course of sedating antipsychotic wouldn't be unreasonable

## 2022-06-11 NOTE — PATIENT PROFILE ADULT - CENTRAL VENOUS CATHETER/PICC LINE
Pt is still unable to get the Livalo and would like to speak with someone in regards to getting her medication. Insurance still will not cover the medication. Please advise. [phone] (w)  [phone](Y)    It is okay to call patient until 4 at her work number. no

## 2022-06-12 LAB
ALBUMIN SERPL ELPH-MCNC: 3.3 G/DL — SIGNIFICANT CHANGE UP (ref 3.3–5)
ALP SERPL-CCNC: 38 U/L — LOW (ref 40–120)
ALT FLD-CCNC: 18 U/L — SIGNIFICANT CHANGE UP (ref 10–45)
ANION GAP SERPL CALC-SCNC: 9 MMOL/L — SIGNIFICANT CHANGE UP (ref 5–17)
AST SERPL-CCNC: 17 U/L — SIGNIFICANT CHANGE UP (ref 10–40)
BASOPHILS # BLD AUTO: 0.03 K/UL — SIGNIFICANT CHANGE UP (ref 0–0.2)
BASOPHILS NFR BLD AUTO: 0.3 % — SIGNIFICANT CHANGE UP (ref 0–2)
BILIRUB SERPL-MCNC: 0.3 MG/DL — SIGNIFICANT CHANGE UP (ref 0.2–1.2)
BLD GP AB SCN SERPL QL: NEGATIVE — SIGNIFICANT CHANGE UP
BUN SERPL-MCNC: 8 MG/DL — SIGNIFICANT CHANGE UP (ref 7–23)
CALCIUM SERPL-MCNC: 9.1 MG/DL — SIGNIFICANT CHANGE UP (ref 8.4–10.5)
CHLORIDE SERPL-SCNC: 105 MMOL/L — SIGNIFICANT CHANGE UP (ref 96–108)
CO2 SERPL-SCNC: 26 MMOL/L — SIGNIFICANT CHANGE UP (ref 22–31)
CREAT SERPL-MCNC: 0.76 MG/DL — SIGNIFICANT CHANGE UP (ref 0.5–1.3)
EGFR: 127 ML/MIN/1.73M2 — SIGNIFICANT CHANGE UP
EOSINOPHIL # BLD AUTO: 0.31 K/UL — SIGNIFICANT CHANGE UP (ref 0–0.5)
EOSINOPHIL NFR BLD AUTO: 3.3 % — SIGNIFICANT CHANGE UP (ref 0–6)
FERRITIN SERPL-MCNC: 8 NG/ML — LOW (ref 30–400)
GLUCOSE SERPL-MCNC: 101 MG/DL — HIGH (ref 70–99)
HCT VFR BLD CALC: 32.8 % — LOW (ref 39–50)
HGB BLD-MCNC: 10.3 G/DL — LOW (ref 13–17)
HIV 1+2 AB+HIV1 P24 AG SERPL QL IA: SIGNIFICANT CHANGE UP
IMM GRANULOCYTES NFR BLD AUTO: 0.4 % — SIGNIFICANT CHANGE UP (ref 0–1.5)
IRON SATN MFR SERPL: 15 UG/DL — LOW (ref 45–165)
IRON SATN MFR SERPL: 5 % — LOW (ref 16–55)
LYMPHOCYTES # BLD AUTO: 2.02 K/UL — SIGNIFICANT CHANGE UP (ref 1–3.3)
LYMPHOCYTES # BLD AUTO: 21.3 % — SIGNIFICANT CHANGE UP (ref 13–44)
MAGNESIUM SERPL-MCNC: 1.7 MG/DL — SIGNIFICANT CHANGE UP (ref 1.6–2.6)
MCHC RBC-ENTMCNC: 25.4 PG — LOW (ref 27–34)
MCHC RBC-ENTMCNC: 31.4 GM/DL — LOW (ref 32–36)
MCV RBC AUTO: 81 FL — SIGNIFICANT CHANGE UP (ref 80–100)
MONOCYTES # BLD AUTO: 0.91 K/UL — HIGH (ref 0–0.9)
MONOCYTES NFR BLD AUTO: 9.6 % — SIGNIFICANT CHANGE UP (ref 2–14)
NEUTROPHILS # BLD AUTO: 6.18 K/UL — SIGNIFICANT CHANGE UP (ref 1.8–7.4)
NEUTROPHILS NFR BLD AUTO: 65.1 % — SIGNIFICANT CHANGE UP (ref 43–77)
NRBC # BLD: 0 /100 WBCS — SIGNIFICANT CHANGE UP (ref 0–0)
PHOSPHATE SERPL-MCNC: 3.6 MG/DL — SIGNIFICANT CHANGE UP (ref 2.5–4.5)
PLATELET # BLD AUTO: 366 K/UL — SIGNIFICANT CHANGE UP (ref 150–400)
POTASSIUM SERPL-MCNC: 3.5 MMOL/L — SIGNIFICANT CHANGE UP (ref 3.5–5.3)
POTASSIUM SERPL-SCNC: 3.5 MMOL/L — SIGNIFICANT CHANGE UP (ref 3.5–5.3)
PROT SERPL-MCNC: 6.4 G/DL — SIGNIFICANT CHANGE UP (ref 6–8.3)
RBC # BLD: 4.05 M/UL — LOW (ref 4.2–5.8)
RBC # FLD: 11.5 % — SIGNIFICANT CHANGE UP (ref 10.3–14.5)
RH IG SCN BLD-IMP: POSITIVE — SIGNIFICANT CHANGE UP
SODIUM SERPL-SCNC: 140 MMOL/L — SIGNIFICANT CHANGE UP (ref 135–145)
TIBC SERPL-MCNC: 327 UG/DL — SIGNIFICANT CHANGE UP (ref 220–430)
TRANSFERRIN SERPL-MCNC: 278 MG/DL — SIGNIFICANT CHANGE UP (ref 200–360)
UIBC SERPL-MCNC: 312 UG/DL — SIGNIFICANT CHANGE UP (ref 110–370)
WBC # BLD: 9.49 K/UL — SIGNIFICANT CHANGE UP (ref 3.8–10.5)
WBC # FLD AUTO: 9.49 K/UL — SIGNIFICANT CHANGE UP (ref 3.8–10.5)

## 2022-06-12 PROCEDURE — 99223 1ST HOSP IP/OBS HIGH 75: CPT

## 2022-06-12 PROCEDURE — 72158 MRI LUMBAR SPINE W/O & W/DYE: CPT | Mod: 26

## 2022-06-12 PROCEDURE — 99232 SBSQ HOSP IP/OBS MODERATE 35: CPT

## 2022-06-12 PROCEDURE — 99233 SBSQ HOSP IP/OBS HIGH 50: CPT | Mod: GC

## 2022-06-12 RX ORDER — CALCIUM CARBONATE 500(1250)
1 TABLET ORAL DAILY
Refills: 0 | Status: DISCONTINUED | OUTPATIENT
Start: 2022-06-12 | End: 2022-06-15

## 2022-06-12 RX ORDER — CHOLECALCIFEROL (VITAMIN D3) 125 MCG
1000 CAPSULE ORAL DAILY
Refills: 0 | Status: DISCONTINUED | OUTPATIENT
Start: 2022-06-12 | End: 2022-06-15

## 2022-06-12 RX ORDER — IRON SUCROSE 20 MG/ML
300 INJECTION, SOLUTION INTRAVENOUS EVERY 24 HOURS
Refills: 0 | Status: COMPLETED | OUTPATIENT
Start: 2022-06-12 | End: 2022-06-14

## 2022-06-12 RX ORDER — POTASSIUM CHLORIDE 20 MEQ
40 PACKET (EA) ORAL ONCE
Refills: 0 | Status: COMPLETED | OUTPATIENT
Start: 2022-06-12 | End: 2022-06-12

## 2022-06-12 RX ORDER — CALCIUM ACETATE 667 MG
667 TABLET ORAL
Refills: 0 | Status: DISCONTINUED | OUTPATIENT
Start: 2022-06-12 | End: 2022-06-12

## 2022-06-12 RX ADMIN — Medication 20 MILLIGRAM(S): at 14:07

## 2022-06-12 RX ADMIN — Medication 650 MILLIGRAM(S): at 02:55

## 2022-06-12 RX ADMIN — Medication 650 MILLIGRAM(S): at 01:55

## 2022-06-12 RX ADMIN — Medication 650 MILLIGRAM(S): at 14:26

## 2022-06-12 RX ADMIN — Medication 1000 UNIT(S): at 17:52

## 2022-06-12 RX ADMIN — Medication 650 MILLIGRAM(S): at 15:33

## 2022-06-12 RX ADMIN — Medication 20 MILLIGRAM(S): at 22:28

## 2022-06-12 RX ADMIN — IRON SUCROSE 176.67 MILLIGRAM(S): 20 INJECTION, SOLUTION INTRAVENOUS at 14:08

## 2022-06-12 RX ADMIN — Medication 40 MILLIEQUIVALENT(S): at 09:37

## 2022-06-12 RX ADMIN — Medication 1 TABLET(S): at 17:52

## 2022-06-12 NOTE — PROGRESS NOTE ADULT - ASSESSMENT
27M PMH UC diagnosed 01/2022, unclear psychiatric disorder (follows with psych), and chronic inflammatory demyelinating polyneuritis presenting with chronic diarrhea, recent R hip pain, and fecal incontinence admitted for UC flare and hip pain workup.

## 2022-06-12 NOTE — PROGRESS NOTE ADULT - ASSESSMENT
27M PMH UC diagnosed 01/2022 (currently on mesalamine + budesonide 9), unclear psychiatric disorder (follows with psych), Lyme disease (treated with prolonged doxy), chronic inflammatory demyelinating polyneuritis presenting with chronic diarrhea, incontinence, and recent R hip pain, admitted for UC flare and hip pain workup.     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 Declined recommendation to start a biologic as well as prednisone outpatient due to concern for possibility would worsen psychiatric illness.  - Currently he is having ~10BM/ day with incontinence and streaks of blood.   - CRP 26  - CT: circumferential contiguous wall thickening involving entire colon, fluid filled colon c/w diarrheal illness  - GI PCR and CDiff neg  - Discussed r/b/i/a of IV steroids with patient, who agrees to trial of IV steroids    Recommendations:  - Daily cbc, bmp, crp  - Solumedrol 20mg IV q8 if patient agreeable  - Vit D/ Calcium while on steroids  - DVT ppx    Case d/w Dr. Smith, [facility] [place]     Maddie Barlow MD  Gastroenterology Fellow, PGY -4   Pager [phone]  [phone]

## 2022-06-12 NOTE — PROGRESS NOTE ADULT - SUBJECTIVE AND OBJECTIVE BOX
GASTROENTEROLOGY PROGRESS NOTE  Patient seen and examined at bedside.  ZIA. Cont to have diarrhea streaked with blood and urgency.  Stool studies neg  Pancolitis on CT    PERTINENT REVIEW OF SYSTEMS:  CONSTITUTIONAL: No weakness, fevers or chills  HEENT: No visual changes; No vertigo or throat pain   GASTROINTESTINAL: As above.  NEUROLOGICAL: No numbness or weakness  SKIN: No itching, burning, rashes, or lesions     Allergies    Seroquel (Other)    Intolerances      MEDICATIONS:  MEDICATIONS  (STANDING):  calcium acetate 667 milliGRAM(s) Oral four times a day with meals  cholecalciferol 1000 Unit(s) Oral daily  iron sucrose IVPB 300 milliGRAM(s) IV Intermittent every 24 hours  lactated ringers. 1000 milliLiter(s) (90 mL/Hr) IV Continuous <Continuous>  methylPREDNISolone sodium succinate Injectable 20 milliGRAM(s) IV Push every 8 hours    MEDICATIONS  (PRN):  acetaminophen     Tablet .. 650 milliGRAM(s) Oral every 6 hours PRN Mild Pain (1 - 3)    Vital Signs Last 24 Hrs  T(C): 36.7 (12 Jun 2022 08:57), Max: 37.3 (11 Jun 2022 20:28)  T(F): 98 (12 Jun 2022 08:57), Max: 99.1 (11 Jun 2022 20:28)  HR: 82 (12 Jun 2022 08:57) (78 - 95)  BP: 129/77 (12 Jun 2022 08:57) (104/69 - 129/77)  BP(mean): --  RR: 16 (12 Jun 2022 08:57) (16 - 18)  SpO2: 100% (12 Jun 2022 08:57) (98% - 100%)    PHYSICAL EXAM:    General: in no acute distress  HEENT: MMM, conjunctiva and sclera clear  Gastrointestinal: Soft non-tender non-distended; No rebound or guarding  Skin: Warm and dry. No obvious rash    LABS:                        10.3   9.49  )-----------( 366      ( 12 Jun 2022 06:57 )             32.8     06-12    140  |  105  |  8   ----------------------------<  101<H>  3.5   |  26  |  0.76    Ca    9.1      12 Jun 2022 06:57  Phos  3.6     06-12  Mg     1.7     06-12    TPro  6.4  /  Alb  3.3  /  TBili  0.3  /  DBili  x   /  AST  17  /  ALT  18  /  AlkPhos  38<L>  06-12    PT/INR - ( 11 Jun 2022 17:07 )   PT: 13.5 sec;   INR: 1.13          PTT - ( 11 Jun 2022 17:07 )  PTT:28.5 sec      Urinalysis Basic - ( 11 Jun 2022 17:18 )    Color: Yellow / Appearance: Clear / SG: >=1.030 / pH: x  Gluc: x / Ketone: NEGATIVE  / Bili: Negative / Urobili: 0.2 E.U./dL   Blood: x / Protein: NEGATIVE mg/dL / Nitrite: NEGATIVE   Leuk Esterase: NEGATIVE / RBC: x / WBC x   Sq Epi: x / Non Sq Epi: x / Bacteria: x                GI PCR Panel, Stool (collected 11 Jun 2022 20:17)  Source: .Stool Feces  Final Report (11 Jun 2022 22:49):    GI PCR Results: NOT detected    *******Please Note:*******    GI panel PCR evaluates for:    Campylobacter, Plesiomonas shigelloides, Salmonella,    Vibrio, Yersinia enterocolitica, Enteroaggregative    Escherichia coli (EAEC), Enteropathogenic E.coli (EPEC),    Enterotoxigenic E. coli (ETEC) lt/st, Shiga-like    toxin-producing E. coli (STEC) stx1/stx2,    Shigella/ Enteroinvasive E. coli (EIEC), Cryptosporidium,    Cyclospora cayetanensis, Entamoeba histolytica,    Giardia lamblia, Adenovirus F 40/41, Astrovirus,    Norovirus GI/GII, Rotavirus A, Sapovirus    Urinalysis with Rflx Culture (collected 11 Jun 2022 17:18)      RADIOLOGY & ADDITIONAL STUDIES:  Reviewed

## 2022-06-12 NOTE — PROGRESS NOTE ADULT - SUBJECTIVE AND OBJECTIVE BOX
OVERNIGHT EVENTS: see hpi    SUBJECTIVE / INTERVAL HPI: Patient seen and examined at bedside. no abdominal pain. Last bloody BM last night. endorsing constant R gluteal pain, 3/10 at rest, 10/10 when weight bearing.      PHYSICAL EXAM:  Constitutional: WDWN resting comfortably in bed; NAD  ENT: dry mucous membranes  Neck: supple  Respiratory: CTA B/L; no W/R/R, no retractions  Cardiac: +S1/S2; RRR; no M/R/G  Gastrointestinal: soft, NT/ND; no rebound or guarding  Extremities: WWP, no clubbing or cyanosis; no peripheral edema  Musculoskeletal: pain with passive flexion of the R hip past 90 degrees  Vascular: 2+ radial, DP pulses B/L  Neurologic: AAOx3; CNII-XII grossly intact; no focal deficits  Psychiatric: affect and characteristics of appearance, verbalizations, behaviors are appropriate    VITAL SIGNS:  Vital Signs Last 24 Hrs  T(C): 36.7 (12 Jun 2022 08:57), Max: 37.3 (11 Jun 2022 20:28)  T(F): 98 (12 Jun 2022 08:57), Max: 99.1 (11 Jun 2022 20:28)  HR: 82 (12 Jun 2022 08:57) (78 - 95)  BP: 129/77 (12 Jun 2022 08:57) (104/69 - 129/77)  BP(mean): --  RR: 16 (12 Jun 2022 08:57) (16 - 18)  SpO2: 100% (12 Jun 2022 08:57) (98% - 100%)      MEDICATIONS:  MEDICATIONS  (STANDING):  lactated ringers. 1000 milliLiter(s) (90 mL/Hr) IV Continuous <Continuous>  methylPREDNISolone sodium succinate Injectable 20 milliGRAM(s) IV Push every 8 hours    MEDICATIONS  (PRN):  acetaminophen     Tablet .. 650 milliGRAM(s) Oral every 6 hours PRN Mild Pain (1 - 3)      ALLERGIES:  Allergies    Seroquel (Other)    Intolerances        LABS:                        10.3   9.49  )-----------( 366      ( 12 Jun 2022 06:57 )             32.8     06-12    140  |  105  |  8   ----------------------------<  101<H>  3.5   |  26  |  0.76    Ca    9.1      12 Jun 2022 06:57  Phos  3.6     06-12  Mg     1.7     06-12    TPro  6.4  /  Alb  3.3  /  TBili  0.3  /  DBili  x   /  AST  17  /  ALT  18  /  AlkPhos  38<L>  06-12    PT/INR - ( 11 Jun 2022 17:07 )   PT: 13.5 sec;   INR: 1.13          PTT - ( 11 Jun 2022 17:07 )  PTT:28.5 sec  Urinalysis Basic - ( 11 Jun 2022 17:18 )    Color: Yellow / Appearance: Clear / SG: >=1.030 / pH: x  Gluc: x / Ketone: NEGATIVE  / Bili: Negative / Urobili: 0.2 E.U./dL   Blood: x / Protein: NEGATIVE mg/dL / Nitrite: NEGATIVE   Leuk Esterase: NEGATIVE / RBC: x / WBC x   Sq Epi: x / Non Sq Epi: x / Bacteria: x      CAPILLARY BLOOD GLUCOSE          RADIOLOGY & ADDITIONAL TESTS: Reviewed.

## 2022-06-13 ENCOUNTER — TRANSCRIPTION ENCOUNTER (OUTPATIENT)
Age: 27
End: 2022-06-13

## 2022-06-13 LAB
ALBUMIN SERPL ELPH-MCNC: 3.5 G/DL — SIGNIFICANT CHANGE UP (ref 3.3–5)
ALP SERPL-CCNC: 40 U/L — SIGNIFICANT CHANGE UP (ref 40–120)
ALT FLD-CCNC: 18 U/L — SIGNIFICANT CHANGE UP (ref 10–45)
ANION GAP SERPL CALC-SCNC: 11 MMOL/L — SIGNIFICANT CHANGE UP (ref 5–17)
AST SERPL-CCNC: 13 U/L — SIGNIFICANT CHANGE UP (ref 10–40)
BASOPHILS # BLD AUTO: 0.01 K/UL — SIGNIFICANT CHANGE UP (ref 0–0.2)
BASOPHILS NFR BLD AUTO: 0.1 % — SIGNIFICANT CHANGE UP (ref 0–2)
BILIRUB SERPL-MCNC: 0.3 MG/DL — SIGNIFICANT CHANGE UP (ref 0.2–1.2)
BLD GP AB SCN SERPL QL: NEGATIVE — SIGNIFICANT CHANGE UP
BUN SERPL-MCNC: 8 MG/DL — SIGNIFICANT CHANGE UP (ref 7–23)
CALCIUM SERPL-MCNC: 9.5 MG/DL — SIGNIFICANT CHANGE UP (ref 8.4–10.5)
CHLORIDE SERPL-SCNC: 104 MMOL/L — SIGNIFICANT CHANGE UP (ref 96–108)
CO2 SERPL-SCNC: 27 MMOL/L — SIGNIFICANT CHANGE UP (ref 22–31)
CREAT SERPL-MCNC: 0.77 MG/DL — SIGNIFICANT CHANGE UP (ref 0.5–1.3)
CRP SERPL-MCNC: 25.6 MG/L — HIGH (ref 0–4)
CULTURE RESULTS: SIGNIFICANT CHANGE UP
CULTURE RESULTS: SIGNIFICANT CHANGE UP
EGFR: 127 ML/MIN/1.73M2 — SIGNIFICANT CHANGE UP
EOSINOPHIL # BLD AUTO: 0 K/UL — SIGNIFICANT CHANGE UP (ref 0–0.5)
EOSINOPHIL NFR BLD AUTO: 0 % — SIGNIFICANT CHANGE UP (ref 0–6)
GLUCOSE SERPL-MCNC: 133 MG/DL — HIGH (ref 70–99)
HCT VFR BLD CALC: 33 % — LOW (ref 39–50)
HGB BLD-MCNC: 10.5 G/DL — LOW (ref 13–17)
IMM GRANULOCYTES NFR BLD AUTO: 0.7 % — SIGNIFICANT CHANGE UP (ref 0–1.5)
LYMPHOCYTES # BLD AUTO: 1.01 K/UL — SIGNIFICANT CHANGE UP (ref 1–3.3)
LYMPHOCYTES # BLD AUTO: 9 % — LOW (ref 13–44)
MAGNESIUM SERPL-MCNC: 1.7 MG/DL — SIGNIFICANT CHANGE UP (ref 1.6–2.6)
MCHC RBC-ENTMCNC: 25.8 PG — LOW (ref 27–34)
MCHC RBC-ENTMCNC: 31.8 GM/DL — LOW (ref 32–36)
MCV RBC AUTO: 81.1 FL — SIGNIFICANT CHANGE UP (ref 80–100)
MONOCYTES # BLD AUTO: 0.39 K/UL — SIGNIFICANT CHANGE UP (ref 0–0.9)
MONOCYTES NFR BLD AUTO: 3.5 % — SIGNIFICANT CHANGE UP (ref 2–14)
NEUTROPHILS # BLD AUTO: 9.72 K/UL — HIGH (ref 1.8–7.4)
NEUTROPHILS NFR BLD AUTO: 86.7 % — HIGH (ref 43–77)
NRBC # BLD: 0 /100 WBCS — SIGNIFICANT CHANGE UP (ref 0–0)
PHOSPHATE SERPL-MCNC: 4.5 MG/DL — SIGNIFICANT CHANGE UP (ref 2.5–4.5)
PLATELET # BLD AUTO: 391 K/UL — SIGNIFICANT CHANGE UP (ref 150–400)
POTASSIUM SERPL-MCNC: 4.3 MMOL/L — SIGNIFICANT CHANGE UP (ref 3.5–5.3)
POTASSIUM SERPL-SCNC: 4.3 MMOL/L — SIGNIFICANT CHANGE UP (ref 3.5–5.3)
PROT SERPL-MCNC: 6.5 G/DL — SIGNIFICANT CHANGE UP (ref 6–8.3)
RBC # BLD: 4.07 M/UL — LOW (ref 4.2–5.8)
RBC # FLD: 11.4 % — SIGNIFICANT CHANGE UP (ref 10.3–14.5)
RH IG SCN BLD-IMP: POSITIVE — SIGNIFICANT CHANGE UP
SODIUM SERPL-SCNC: 142 MMOL/L — SIGNIFICANT CHANGE UP (ref 135–145)
SPECIMEN SOURCE: SIGNIFICANT CHANGE UP
SPECIMEN SOURCE: SIGNIFICANT CHANGE UP
WBC # BLD: 11.21 K/UL — HIGH (ref 3.8–10.5)
WBC # FLD AUTO: 11.21 K/UL — HIGH (ref 3.8–10.5)

## 2022-06-13 PROCEDURE — 73721 MRI JNT OF LWR EXTRE W/O DYE: CPT | Mod: 26,RT

## 2022-06-13 PROCEDURE — 99232 SBSQ HOSP IP/OBS MODERATE 35: CPT

## 2022-06-13 PROCEDURE — 99233 SBSQ HOSP IP/OBS HIGH 50: CPT | Mod: GC

## 2022-06-13 PROCEDURE — 99233 SBSQ HOSP IP/OBS HIGH 50: CPT

## 2022-06-13 RX ORDER — ENOXAPARIN SODIUM 100 MG/ML
40 INJECTION SUBCUTANEOUS EVERY 24 HOURS
Refills: 0 | Status: DISCONTINUED | OUTPATIENT
Start: 2022-06-13 | End: 2022-06-15

## 2022-06-13 RX ORDER — MAGNESIUM SULFATE 500 MG/ML
2 VIAL (ML) INJECTION ONCE
Refills: 0 | Status: COMPLETED | OUTPATIENT
Start: 2022-06-13 | End: 2022-06-13

## 2022-06-13 RX ORDER — PANTOPRAZOLE SODIUM 20 MG/1
40 TABLET, DELAYED RELEASE ORAL
Refills: 0 | Status: DISCONTINUED | OUTPATIENT
Start: 2022-06-13 | End: 2022-06-15

## 2022-06-13 RX ADMIN — Medication 1000 UNIT(S): at 12:41

## 2022-06-13 RX ADMIN — PANTOPRAZOLE SODIUM 40 MILLIGRAM(S): 20 TABLET, DELAYED RELEASE ORAL at 07:55

## 2022-06-13 RX ADMIN — Medication 25 GRAM(S): at 07:55

## 2022-06-13 RX ADMIN — Medication 1 TABLET(S): at 12:41

## 2022-06-13 RX ADMIN — Medication 20 MILLIGRAM(S): at 14:37

## 2022-06-13 RX ADMIN — Medication 20 MILLIGRAM(S): at 05:57

## 2022-06-13 RX ADMIN — IRON SUCROSE 176.67 MILLIGRAM(S): 20 INJECTION, SOLUTION INTRAVENOUS at 12:41

## 2022-06-13 RX ADMIN — Medication 20 MILLIGRAM(S): at 21:28

## 2022-06-13 NOTE — DIETITIAN INITIAL EVALUATION ADULT - ADD RECOMMEND
1. Continue with current diet order and implement ONS (Ensure Max QD)  2. Monitor PO intakes, trend weights (weekly), monitor skin integrity, monitor labs (electrolytes, CMP)   3. Encourage pt to meet nutritional needs as able   4. Encourage adherence to diet education  1. Continue with current diet order and implement ONS (Ensure Max QD, high protein, low fat, low fiber, GF, lactose-intolerant friendly ONS)  2. Monitor PO intakes, trend weights (weekly), monitor skin integrity, monitor labs (electrolytes, CMP)   3. Encourage pt to meet nutritional needs as able   4. Encourage adherence to diet education

## 2022-06-13 NOTE — DIETITIAN INITIAL EVALUATION ADULT - PERTINENT MEDS FT
MEDICATIONS  (STANDING):  calcium carbonate   1250 mG (OsCal) 1 Tablet(s) Oral daily  cholecalciferol 1000 Unit(s) Oral daily  enoxaparin Injectable 40 milliGRAM(s) SubCutaneous every 24 hours  iron sucrose IVPB 300 milliGRAM(s) IV Intermittent every 24 hours  methylPREDNISolone sodium succinate Injectable 20 milliGRAM(s) IV Push every 8 hours  pantoprazole    Tablet 40 milliGRAM(s) Oral before breakfast    MEDICATIONS  (PRN):  acetaminophen     Tablet .. 650 milliGRAM(s) Oral every 6 hours PRN Mild Pain (1 - 3)

## 2022-06-13 NOTE — DISCHARGE NOTE PROVIDER - CARE PROVIDER_API CALL
Chao Smith  [address]  [place] [address]  Phone: [phone]  Fax: (   )    -  Established Patient  Scheduled Appointment: 07/05/2022 11:00 AM

## 2022-06-13 NOTE — DISCHARGE NOTE PROVIDER - PROVIDER TOKENS
FREE:[LAST:[Luis],FIRST:[Chao],PHONE:[phone]],FAX:[phone]   [phone]    -],ADDRESS:[[address] [place] [address]],SCHEDULEDAPPT:[07/05/2022],SCHEDULEDAPPTTIME:[11:00 AM],ESTABLISHEDPATIENT:[T]]

## 2022-06-13 NOTE — DISCHARGE NOTE PROVIDER - NSDCFUADDAPPT_GEN_ALL_CORE_FT
Toro Geronimo [phone] Toro Geronimo [phone]    Please follow up with your Gastroenterologist Dr. Jayden Smith on Tuesday July 5th at 11AM

## 2022-06-13 NOTE — DIETITIAN INITIAL EVALUATION ADULT - OTHER CALCULATIONS
Based on Standards of Care pt within % IBW thus actual body weight used for all calculations. Needs adjusted for advanced age and malnutrition.  Based on Standards of Care pt within % IBW thus actual body weight used for all calculations.

## 2022-06-13 NOTE — DIETITIAN INITIAL EVALUATION ADULT - NS FNS REASON FOR WEIGHT CHANG
-recent dx of ulcerative colitis  -unknown amount of weight loss per pt but as per pt's MD and pt during RD interview with pt, it was significant/altered GI function (specify)

## 2022-06-13 NOTE — DISCHARGE NOTE PROVIDER - NSDCCPCAREPLAN_GEN_ALL_CORE_FT
PRINCIPAL DISCHARGE DIAGNOSIS  Diagnosis: Ulcerative colitis  Assessment and Plan of Treatment:       SECONDARY DISCHARGE DIAGNOSES  Diagnosis: Hip pain, right  Assessment and Plan of Treatment:      PRINCIPAL DISCHARGE DIAGNOSIS  Diagnosis: Ulcerative colitis  Assessment and Plan of Treatment: You were admitted to the hospital for an ulcerative colitis flare. Youw ere treated with IV steroids which decreased the inflammation associated with this condition. You will need to continue to take oral PREDNISONE FOR ___ DAYS      SECONDARY DISCHARGE DIAGNOSES  Diagnosis: Hip pain, right  Assessment and Plan of Treatment: We did an MRI of your hip and and spine becuase you had hip pain. The hip showed a labrum tear. The treatment for this is physical therapy and pain control with tylenol. Please follow up outpatient for continued physical therapy as needed. PLEASE AVOID NSAIDS LIKE IBUPROFEN, MOTRIN, NAPROXEN AS THESE CAN CAUSE BLOODY STOOLS.     PRINCIPAL DISCHARGE DIAGNOSIS  Diagnosis: Ulcerative colitis  Assessment and Plan of Treatment: You were admitted to the hospital for an ulcerative colitis flare. You were treated with IV steroids which decreased the inflammation associated with this condition. You will need to continue to take oral PREDNISONE TAPER. You will take 60mg once a day until Friday 6/17. On 6/18, you will start your taper of reducing by 5mg every 5 days. You will take Prednisone 55mg once a day for 5 days, followed by 50mg for 5 days, etc. Please follow up with your GI doctor Dr. Jayden Smith on July 5th at 11AM.      SECONDARY DISCHARGE DIAGNOSES  Diagnosis: Hip pain, right  Assessment and Plan of Treatment: We did an MRI of your hip and and spine becuase you had hip pain. The hip showed a labrum tear. The treatment for this is physical therapy and pain control with tylenol. Please follow up outpatient for continued physical therapy as needed. PLEASE AVOID NSAIDS LIKE IBUPROFEN, MOTRIN, NAPROXEN AS THESE CAN CAUSE BLOODY STOOLS.

## 2022-06-13 NOTE — PROGRESS NOTE ADULT - SUBJECTIVE AND OBJECTIVE BOX
GASTROENTEROLOGY PROGRESS NOTE  Patient seen and examined at bedside.  Started IV steroids yesterday  Diarrhea somewhat improved; hip pain improving after steroids    PERTINENT REVIEW OF SYSTEMS:  CONSTITUTIONAL: No weakness, fevers or chills  HEENT: No visual changes; No vertigo or throat pain   GASTROINTESTINAL: As above.  NEUROLOGICAL: No numbness or weakness  SKIN: No itching, burning, rashes, or lesions     Allergies    Seroquel (Other)    Intolerances      MEDICATIONS:  MEDICATIONS  (STANDING):  calcium carbonate   1250 mG (OsCal) 1 Tablet(s) Oral daily  cholecalciferol 1000 Unit(s) Oral daily  enoxaparin Injectable 40 milliGRAM(s) SubCutaneous every 24 hours  iron sucrose IVPB 300 milliGRAM(s) IV Intermittent every 24 hours  methylPREDNISolone sodium succinate Injectable 20 milliGRAM(s) IV Push every 8 hours  pantoprazole    Tablet 40 milliGRAM(s) Oral before breakfast    MEDICATIONS  (PRN):  acetaminophen     Tablet .. 650 milliGRAM(s) Oral every 6 hours PRN Mild Pain (1 - 3)    Vital Signs Last 24 Hrs  T(C): 36.7 (13 Jun 2022 16:37), Max: 37.2 (13 Jun 2022 09:00)  T(F): 98.1 (13 Jun 2022 16:37), Max: 98.9 (13 Jun 2022 09:00)  HR: 82 (13 Jun 2022 16:37) (68 - 93)  BP: 112/60 (13 Jun 2022 16:37) (100/62 - 112/65)  BP(mean): --  RR: 17 (13 Jun 2022 16:37) (16 - 18)  SpO2: 98% (13 Jun 2022 16:37) (95% - 98%)    06-12 @ 07:01  -  06-13 @ 07:00  --------------------------------------------------------  IN: 0 mL / OUT: 200 mL / NET: -200 mL    06-13 @ 07:01 - 06-13 @ 17:30  --------------------------------------------------------  IN: 250 mL / OUT: 0 mL / NET: 250 mL      PHYSICAL EXAM:    General: in no acute distress, thin  HEENT: MMM, conjunctiva and sclera clear  Gastrointestinal: Soft non-tender non-distended; No rebound or guarding  Skin: Warm and dry. No obvious rash    LABS:                        10.5   11.21 )-----------( 391      ( 13 Jun 2022 06:52 )             33.0     06-13    142  |  104  |  8   ----------------------------<  133<H>  4.3   |  27  |  0.77    Ca    9.5      13 Jun 2022 06:52  Phos  4.5     06-13  Mg     1.7     06-13    TPro  6.5  /  Alb  3.5  /  TBili  0.3  /  DBili  x   /  AST  13  /  ALT  18  /  AlkPhos  40  06-13                      GI PCR Panel, Stool (collected 11 Jun 2022 20:17)  Source: .Stool Feces  Final Report (11 Jun 2022 22:49):    GI PCR Results: NOT detected    *******Please Note:*******    GI panel PCR evaluates for:    Campylobacter, Plesiomonas shigelloides, Salmonella,    Vibrio, Yersinia enterocolitica, Enteroaggregative    Escherichia coli (EAEC), Enteropathogenic E.coli (EPEC),    Enterotoxigenic E. coli (ETEC) lt/st, Shiga-like    toxin-producing E. coli (STEC) stx1/stx2,    Shigella/ Enteroinvasive E. coli (EIEC), Cryptosporidium,    Cyclospora cayetanensis, Entamoeba histolytica,    Giardia lamblia, Adenovirus F 40/41, Astrovirus,    Norovirus GI/GII, Rotavirus A, Sapovirus    Urinalysis with Rflx Culture (collected 11 Jun 2022 17:18)      RADIOLOGY & ADDITIONAL STUDIES:  Reviewed

## 2022-06-13 NOTE — DIETITIAN INITIAL EVALUATION ADULT - PERSON TAUGHT/METHOD
RD provided education to pt (verbally and with nutrition education information handouts from the nutrition care manual) in regards to IBD-Ulcerative Colitis, how to manage s/s of UC with food and nutrition, foods to limit and foods to try to integrate. RD emphasized to pt that smaller, frequent meals may be more tolerable and put less stress on the GI system. Pt was receptive and verbalized understanding. No additional nutrition-related concerns./verbal instruction/written material/teach back - (Patient repeats in own words)/patient instructed

## 2022-06-13 NOTE — PROGRESS NOTE ADULT - ASSESSMENT
27M PMH UC diagnosed 01/2022 (currently on mesalamine + budesonide 9), unclear psychiatric disorder (follows with psych), Lyme disease (treated with prolonged doxy), chronic inflammatory demyelinating polyneuritis presenting with chronic diarrhea, incontinence, and recent R hip pain, admitted for UC flare and hip pain workup.     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 Declined recommendation to start a biologic as well as prednisone outpatient due to concern for possibility would worsen psychiatric illness.  - Currently he is having ~10BM/ day with incontinence and streaks of blood.   - CRP 26  - CT: circumferential contiguous wall thickening involving entire colon, fluid filled colon c/w diarrheal illness  - GI PCR and CDiff neg  - Discussed r/b/i/a of IV steroids with patient, who agrees to trial of IV steroids    Recommendations:  - Daily cbc, bmp, crp  - Obtain QuantiFeron gold and hep B serologies  - Continue Solumedrol 20mg IV q8  - Vit D/ Calcium while on steroids  - DVT ppx    Case d/w primary team and IBD attending     Maddie Barlow MD  Gastroenterology Fellow, PGY -4   Pager [phone]  [phone]

## 2022-06-13 NOTE — DISCHARGE NOTE PROVIDER - NSDCMRMEDTOKEN_GEN_ALL_CORE_FT
budesonide 3 mg oral capsule, extended release:   mesalamine 0.375 g oral capsule, extended release: 4 cap(s) orally once a day (in the morning)  mesalamine 4 g/60 mL rectal enema: 60 milliliter(s) rectal once a day (at bedtime)   budesonide 3 mg oral capsule, extended release:   mesalamine 0.375 g oral capsule, extended release: 4 cap(s) orally once a day (in the morning)  mesalamine 4 g/60 mL rectal enema: 60 milliliter(s) rectal once a day (at bedtime)  outpatient PT 2-3X/week:    calcium carbonate 1250 mg (500 mg elemental calcium) oral tablet: 1 tab(s) orally once a day  cholecalciferol oral tablet: 1000 unit(s) orally once a day  outpatient PT 2-3X/week:   pantoprazole 40 mg oral delayed release tablet: 1 tab(s) orally once a day (before a meal)   calcium carbonate 1250 mg (500 mg elemental calcium) oral tablet: 1 tab(s) orally once a day  cholecalciferol oral tablet: 1000 unit(s) orally once a day  ferrous sulfate 325 mg (65 mg elemental iron) oral tablet: 1 tab(s) orally once a day   outpatient PT 2-3X/week:   pantoprazole 40 mg oral delayed release tablet: 1 tab(s) orally once a day (before a meal)  predniSONE 10 mg oral tablet: 60mg daily until 6/17, then decrease 5mg every 5 days

## 2022-06-13 NOTE — DISCHARGE NOTE PROVIDER - ATTENDING DISCHARGE PHYSICAL EXAMINATION:
I have read and agree with the resident Discharge Note above.  Patient seen and discussed with resident team on the day of discharge.     Briefly, 27M PMH UC diagnosed 01/2022, unclear psychiatric disorder (follows with psych), and chronic inflammatory demyelinating polyneuritis presented  worsening diarrhea consistent with UC flare and hip pain found to have labral tear.    #UC flare  - s/p IV steroids and transitioned to prednisone taper  - patient has scheduled follow up with outpatient GI Dr. Smith    #BRIJESH  - iron supplementation    #R hip pain due to labrum tear  - MR hip with labral tear, continue to work with outpatient PT.  Discussed avoiding NSAIDs for pain, improving on steroids  - no avascular necrosis on imaging      Attending exam on day of discharge:   Gen: NAD, lying comfortably in bed, reports that diarrhea improving   HEENT: NCAT, MMM, clear OP  Neck: supple, trachea at midline  CV: RRR, no m/g/r appreciated  Pulm: CTA B, no w/r/r, normal work of breathing  Abd: +BS, soft, NTND  : deferred  Skin: no rashes, ulcers, jaundice; intact, warm and dry  Ext: WWP, no c/c/e  MSK: 5/5 strength, normal range of motion, no swollen or erythematous joints.  RLE with pain on internal rotation  Neuro: AOx3, no change from baseline  Psych: pleasant, conversant and appropriate    I was physically present for the evaluation and management services provided. I agree with the included history, physical, and plan which I reviewed and edited where appropriate. I spent > 30 minutes with the patient and the patient's family on direct patient care and discharge planning with more than 50% of the visit spent on counseling and/or coordination of care.     Shaheen Christiansen  [phone]

## 2022-06-13 NOTE — PROGRESS NOTE ADULT - ASSESSMENT
27M PMH UC diagnosed 01/2022, unclear psychiatric disorder (follows with psych), and chronic inflammatory demyelinating polyneuritis presenting with chronic diarrhea, recent R hip pain, and fecal incontinence admitted for UC flare and hip pain workup.      Problem/Plan - 1:  ·  Problem: Ulcerative colitis.   ·  Plan: Hx of chronic diarrhea from ulcerative colitis diagnosed via colonoscopy in 01/2022.   - Follows with Dr. Soriano as an outpatient.   - Has been on mesalamine in the past that has mildly relieved diarrhea but still has 8 BMs per day.   - One month ago, pt started to have issues with fecal urge incontinence for which GI started him on budesonide.  - GI consulted in ED - reccomending steroids for UC flare  - reportedly has been off and on antibiotics for concern for chronic lyme disease  - infectious w/u negative  Plan:  - c/w solumedrol 20 q8 per GI  - regular diet for now  - CRP with AM labs  - calcium and vitamin D while on steroids.     Problem/Plan - 2:  ·  Problem: Hip pain, right.   ·  Plan: Seen by neuro. less likely neuropathic  Plan:  - f/u MR pelvis, consider Ortho  - tylenol for mild pain for now  - PT consult after MRI.     Problem/Plan - 3:  ·  Problem: Anemia.   ·  Plan: Fe studies suggestive of BRIJESH    Plan:  - begin IV sucrose 300 qd X 3 doses, as infectious work up negative  - maintain active T+S  - monitor daily CBC.     Problem/Plan - 4:  ·  Problem: Psychiatric disorder.   ·  Plan: Pt with hx of psychiatric disorder with episodes of rage. Has been called BPD in the past but mother says that different psychiatrists have not been able to agree on a diagnosis and that there may be from Lyme or autoimmune etiology. Reports that symptoms of tuyet and rage have appeared to be well-controlled with last psychiatric hospitalization in 2017. Pt says that rage normally is exhibited towards his mother who is his primary care giver. In the ED, pt was pleasant to provider but was often short with his mother.  - per pt's former psychiatrist, pt has rage issues with delusions of mother causing his medical issues. psychiatrist has signed off due to unhealthy relationship between mother and patient.   - rage outbursts tend to be more verbal than physical  - refuses psych meds  Plan:  - currently denies SI/HI or other issues with mood  - will avoid antipsychotics given hx of tardive dyskinesia  - if agitated during stay, can try ativan or benadryl but if not working former psychiatrist says that short course of sedating antipsychotic wouldn't be unreasonable.     Problem/Plan - 5:  ·  Problem: Nutrition, metabolism, and development symptoms.   ·  Plan: F: LR @ 90cc/hr  E: replete prn  N: regular diet  DVT ppx: SCDs as low risk  Code status: full code.   27M PMH UC diagnosed 01/2022, unclear psychiatric disorder (follows with psych), and chronic inflammatory demyelinating polyneuritis presenting with chronic diarrhea, recent R hip pain, and fecal incontinence admitted for UC flare and hip pain workup.

## 2022-06-13 NOTE — PROGRESS NOTE ADULT - ASSESSMENT
Assessment:  This is a 27y Male w/ h/o of UC started on methylprednisolone, presenting with sever pain in the Rt buttock area. The patient pain is much better since starting the steroids. The characteristics of the pain does not suggest radiculopathy. It could be muscular or piriformis muscle syndrome. MR spin was not significant. The patient is still waiting for Pelvis MR, which we are not expecting to be yielding     Plan:  No further intervention from neurology point of view   Will follow the pelvis MR   Out patient follow up  Assessment:  This is a 27y Male w/ h/o of UC started on methylprednisolone, presenting with sever pain in the Rt buttock area. The patient pain is much better since starting the steroids. The characteristics of the pain does not suggest radiculopathy. It could be muscular or piriformis muscle syndrome. MR spin was not significant. The patient is still waiting for Pelvis MR,     Plan:  No further inpatient intervention from neurology point of view   Will follow the pelvis MR   Out patient follow up

## 2022-06-13 NOTE — PROGRESS NOTE ADULT - SUBJECTIVE AND OBJECTIVE BOX
Neurology Progress Note    Interval History: The patient was seen and examined at the bedside. No issues overnight. The patient feels better after he was started on steroid. His pain is down to 6/10 (from 10/10).     HPI:  27M PMH UC diagnosed 01/2022, unclear psychiatric disorder (follows with psych), and chronic inflammatory demyelinating polyneuritis presenting with chronic diarrhea, recent R hip pain, and fecal incontinence. Pt has had chronic diarrhea from ulcerative colitis diagnosed via colonoscopy in 01/2022. Follows with Dr. Soriano as an outpatient. Has been on mesalamine in the past that has mildly relieved diarrhea but still has 8 BMs per day. One month ago, pt started to have issues with fecal urge incontinence for which GI started him on budesonide. Then, 9 days ago, pt had sudden onset R hip pain. Pain first occurred while he was sitting and pt denies recent trauma. Pain is so bad that he has had difficulty ambulating and this has caused him to not be able to make it to the restroom in time, causing him to soil himself. Occasionally radiates down the leg, but mostly complains of stabbing and throbbing pain in the R gluteal region. Worse with walking and applying pressure to the area, but can also occur while sitting still. Was in ED 2 days ago with negative R hip radiograph and discharged home. Pt has had a little bit of nausea but able to tolerate PO. Pt otherwise denies fever, chills, chest pain, sob, numbness or tingling of the extremities, focal neurologic deficits.     ED Course:  VS: T 98.1, HR 95, /69, O2 98%  Labs s/f: Hb 11.1, CRP 26.4, ESR 40  Treatment: 1L NS, GI and neuro consulted (11 Jun 2022 18:31)      PAST MEDICAL & SURGICAL HISTORY:  Ulcerative colitis    Psychiatric disorder            Medications:  acetaminophen     Tablet .. 650 milliGRAM(s) Oral every 6 hours PRN  calcium carbonate   1250 mG (OsCal) 1 Tablet(s) Oral daily  cholecalciferol 1000 Unit(s) Oral daily  enoxaparin Injectable 40 milliGRAM(s) SubCutaneous every 24 hours  iron sucrose IVPB 300 milliGRAM(s) IV Intermittent every 24 hours  methylPREDNISolone sodium succinate Injectable 20 milliGRAM(s) IV Push every 8 hours  pantoprazole    Tablet 40 milliGRAM(s) Oral before breakfast      Vital Signs Last 24 Hrs  T(C): 37.2 (13 Jun 2022 09:00), Max: 37.2 (13 Jun 2022 09:00)  T(F): 98.9 (13 Jun 2022 09:00), Max: 98.9 (13 Jun 2022 09:00)  HR: 93 (13 Jun 2022 09:00) (68 - 99)  BP: 112/65 (13 Jun 2022 09:00) (100/62 - 118/73)  BP(mean): --  RR: 16 (13 Jun 2022 09:00) (16 - 18)  SpO2: 95% (13 Jun 2022 09:00) (95% - 98%)    Neurological Exam:   Mental status: Awake, alert and oriented x3.  Recent and remote memory intact.  Naming, repetition and comprehension intact.  Attention/concentration intact.  No dysarthria, no aphasia.  Fund of knowledge appropriate.    Cranial nerves: Pupils equally round and reactive to light, visual fields full, no nystagmus, extraocular muscles intact, V1 through V3 intact bilaterally and symmetric, face symmetric, hearing intact to finger rub, palate elevation symmetric, tongue was midline.  Motor:  MRC grading 5/5 b/l UE/LE.   strength 5/5.  Normal tone and bulk.  No abnormal movements.    Sensation: Intact to light touch, proprioception, and pinprick.   Coordination: No dysmetria on finger-to-nose and heel-to-shin.  No dysdiadokinesia.  Reflexes: 2+ in bilateral UE/LE, downgoing toes bilaterally. (-) Johnson.  Gait: deferred because of the pain.   Tenderness on the medial area of the Rt buttock     Labs:  CBC Full  -  ( 13 Jun 2022 06:52 )  WBC Count : 11.21 K/uL  RBC Count : 4.07 M/uL  Hemoglobin : 10.5 g/dL  Hematocrit : 33.0 %  Platelet Count - Automated : 391 K/uL  Mean Cell Volume : 81.1 fl  Mean Cell Hemoglobin : 25.8 pg  Mean Cell Hemoglobin Concentration : 31.8 gm/dL  Auto Neutrophil # : 9.72 K/uL  Auto Lymphocyte # : 1.01 K/uL  Auto Monocyte # : 0.39 K/uL  Auto Eosinophil # : 0.00 K/uL  Auto Basophil # : 0.01 K/uL  Auto Neutrophil % : 86.7 %  Auto Lymphocyte % : 9.0 %  Auto Monocyte % : 3.5 %  Auto Eosinophil % : 0.0 %  Auto Basophil % : 0.1 %    06-13    142  |  104  |  8   ----------------------------<  133<H>  4.3   |  27  |  0.77    Ca    9.5      13 Jun 2022 06:52  Phos  4.5     06-13  Mg     1.7     06-13    TPro  6.5  /  Alb  3.5  /  TBili  0.3  /  DBili  x   /  AST  13  /  ALT  18  /  AlkPhos  40  06-13    LIVER FUNCTIONS - ( 13 Jun 2022 06:52 )  Alb: 3.5 g/dL / Pro: 6.5 g/dL / ALK PHOS: 40 U/L / ALT: 18 U/L / AST: 13 U/L / GGT: x           PT/INR - ( 11 Jun 2022 17:07 )   PT: 13.5 sec;   INR: 1.13          PTT - ( 11 Jun 2022 17:07 )  PTT:28.5 sec  Urinalysis Basic - ( 11 Jun 2022 17:18 )    Color: Yellow / Appearance: Clear / SG: >=1.030 / pH: x  Gluc: x / Ketone: NEGATIVE  / Bili: Negative / Urobili: 0.2 E.U./dL   Blood: x / Protein: NEGATIVE mg/dL / Nitrite: NEGATIVE   Leuk Esterase: NEGATIVE / RBC: x / WBC x   Sq Epi: x / Non Sq Epi: x / Bacteria: x

## 2022-06-13 NOTE — DIETITIAN INITIAL EVALUATION ADULT - OTHER INFO
This is a 27M PMH UC diagnosed 01/2022, unclear psychiatric disorder/questions bipolar, and chronic inflammatory demyelinating polyneuritis, presenting with chronic diarrhea, recent R hip pain, and fecal incontinence admitted for UC flare and hip pain workup.     Pt seen on 4UR at bedside. Pt stated UBW unknown however he stated he had recent weight loss, around when his UC dx/s/s began; this would not be consistent with pt's weight upon admission (~131 pounds). Per pt's emergency contact (mother), pt has lost ~10#, time frame unspecified. Appetite currently good per pt; PTA, appetite was fair to good per pt. As per 24h recall, PTA pt consumed foods that are "less inflammatory," pt has limited dairy, gluten intakes, high-fat foods; consumes 3 meals per day at home. During current admission, consumption of ~% meals on average as noted per pt. GI: s/s diarrhea noted; no s/s of N/V noted; last BM noted on 06/12/22. David: 18. Skin integrity: within normal limits at this time. No edema noted. I/O: 0/200 (-200). NKFA. No issues chewing/swallowing noted. Pertinent lab values: Glucose levels elevated, trending upward; will monitor. Pt is receiving a Regular diet. Pt is receiving calcium carbonate, cholecalciferol, iron sucrose IVPB, methylprednisolone. Dietitian conducted NFPE: during NFPE, pt exhibited mild subcutaneous fat loss to the orbital region, no other s/s of malnutrition from nutrition focused physical exam noted. RD provided education to pt (verbally and with nutrition education information handouts from the nutrition care manual) in regards to IBD-Ulcerative Colitis, how to manage s/s of UC with food and nutrition, foods to limit and foods to try to integrate. RD emphasized to pt that smaller, frequent meals may be more tolerable and put less stress on the GI system. Pt was receptive and verbalized understanding. No additional nutrition-related concerns. RD will remain available. Additional nutrition recommendations listed below to follow.

## 2022-06-13 NOTE — DIETITIAN INITIAL EVALUATION ADULT - PERTINENT LABORATORY DATA
06-13    142  |  104  |  8   ----------------------------<  133<H>  4.3   |  27  |  0.77    Ca    9.5      13 Jun 2022 06:52  Phos  4.5     06-13  Mg     1.7     06-13    TPro  6.5  /  Alb  3.5  /  TBili  0.3  /  DBili  x   /  AST  13  /  ALT  18  /  AlkPhos  40  06-13

## 2022-06-14 LAB
ALBUMIN SERPL ELPH-MCNC: 3.4 G/DL — SIGNIFICANT CHANGE UP (ref 3.3–5)
ALP SERPL-CCNC: 40 U/L — SIGNIFICANT CHANGE UP (ref 40–120)
ALT FLD-CCNC: 19 U/L — SIGNIFICANT CHANGE UP (ref 10–45)
ANION GAP SERPL CALC-SCNC: 9 MMOL/L — SIGNIFICANT CHANGE UP (ref 5–17)
AST SERPL-CCNC: 12 U/L — SIGNIFICANT CHANGE UP (ref 10–40)
BILIRUB SERPL-MCNC: 0.2 MG/DL — SIGNIFICANT CHANGE UP (ref 0.2–1.2)
BUN SERPL-MCNC: 12 MG/DL — SIGNIFICANT CHANGE UP (ref 7–23)
CALCIUM SERPL-MCNC: 9.2 MG/DL — SIGNIFICANT CHANGE UP (ref 8.4–10.5)
CHLORIDE SERPL-SCNC: 102 MMOL/L — SIGNIFICANT CHANGE UP (ref 96–108)
CO2 SERPL-SCNC: 29 MMOL/L — SIGNIFICANT CHANGE UP (ref 22–31)
CREAT SERPL-MCNC: 0.8 MG/DL — SIGNIFICANT CHANGE UP (ref 0.5–1.3)
CRP SERPL-MCNC: 12.6 MG/L — HIGH (ref 0–4)
EGFR: 125 ML/MIN/1.73M2 — SIGNIFICANT CHANGE UP
GLUCOSE SERPL-MCNC: 137 MG/DL — HIGH (ref 70–99)
HBV CORE IGM SER-ACNC: SIGNIFICANT CHANGE UP
HBV SURFACE AB SER-ACNC: SIGNIFICANT CHANGE UP
HBV SURFACE AG SER-ACNC: SIGNIFICANT CHANGE UP
HCT VFR BLD CALC: 33.7 % — LOW (ref 39–50)
HGB BLD-MCNC: 10.6 G/DL — LOW (ref 13–17)
MAGNESIUM SERPL-MCNC: 2 MG/DL — SIGNIFICANT CHANGE UP (ref 1.6–2.6)
MCHC RBC-ENTMCNC: 25.6 PG — LOW (ref 27–34)
MCHC RBC-ENTMCNC: 31.5 GM/DL — LOW (ref 32–36)
MCV RBC AUTO: 81.4 FL — SIGNIFICANT CHANGE UP (ref 80–100)
NRBC # BLD: 0 /100 WBCS — SIGNIFICANT CHANGE UP (ref 0–0)
PHOSPHATE SERPL-MCNC: 4.1 MG/DL — SIGNIFICANT CHANGE UP (ref 2.5–4.5)
PLATELET # BLD AUTO: 446 K/UL — HIGH (ref 150–400)
POTASSIUM SERPL-MCNC: 4.1 MMOL/L — SIGNIFICANT CHANGE UP (ref 3.5–5.3)
POTASSIUM SERPL-SCNC: 4.1 MMOL/L — SIGNIFICANT CHANGE UP (ref 3.5–5.3)
PROT SERPL-MCNC: 6.5 G/DL — SIGNIFICANT CHANGE UP (ref 6–8.3)
RBC # BLD: 4.14 M/UL — LOW (ref 4.2–5.8)
RBC # FLD: 11.6 % — SIGNIFICANT CHANGE UP (ref 10.3–14.5)
SODIUM SERPL-SCNC: 140 MMOL/L — SIGNIFICANT CHANGE UP (ref 135–145)
WBC # BLD: 12.67 K/UL — HIGH (ref 3.8–10.5)
WBC # FLD AUTO: 12.67 K/UL — HIGH (ref 3.8–10.5)

## 2022-06-14 PROCEDURE — 99233 SBSQ HOSP IP/OBS HIGH 50: CPT | Mod: GC

## 2022-06-14 PROCEDURE — 99232 SBSQ HOSP IP/OBS MODERATE 35: CPT

## 2022-06-14 RX ADMIN — Medication 20 MILLIGRAM(S): at 14:20

## 2022-06-14 RX ADMIN — PANTOPRAZOLE SODIUM 40 MILLIGRAM(S): 20 TABLET, DELAYED RELEASE ORAL at 06:47

## 2022-06-14 RX ADMIN — Medication 20 MILLIGRAM(S): at 23:14

## 2022-06-14 RX ADMIN — IRON SUCROSE 176.67 MILLIGRAM(S): 20 INJECTION, SOLUTION INTRAVENOUS at 12:06

## 2022-06-14 RX ADMIN — Medication 1000 UNIT(S): at 12:05

## 2022-06-14 RX ADMIN — Medication 1 TABLET(S): at 12:04

## 2022-06-14 RX ADMIN — Medication 20 MILLIGRAM(S): at 06:48

## 2022-06-14 NOTE — PROGRESS NOTE ADULT - ASSESSMENT
27M PMH UC diagnosed 01/2022 (currently on mesalamine + budesonide 9), unclear psychiatric disorder (follows with psych), Lyme disease (treated with prolonged doxy), chronic inflammatory demyelinating polyneuritis presenting with chronic diarrhea, incontinence, and recent R hip pain, admitted for UC flare and hip pain workup.     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 Declined recommendation to start a biologic as well as prednisone outpatient due to concern for possibility would worsen psychiatric illness.  - Currently he is having ~10BM/ day with incontinence and streaks of blood.   - CRP 26  - CT: circumferential contiguous wall thickening involving entire colon, fluid filled colon c/w diarrheal illness  - GI PCR and CDiff neg  - Discussed r/b/i/a of IV steroids with patient, who agrees to trial of IV steroids    Recommendations:  - Daily cbc, bmp, crp  - F/u QuantiFeron gold and hep B serologies  - Continue Solumedrol 20mg IV q8  - Vit D/ Calcium while on steroids  - DVT ppx    Case d/w primary team and IBD attending     Maddie Barlow MD  Gastroenterology Fellow, PGY -4   Pager [phone]  [phone]

## 2022-06-14 NOTE — PROGRESS NOTE ADULT - SUBJECTIVE AND OBJECTIVE BOX
Medicine Progress Note    Patient is a 27y old  Male who presents with a chief complaint of UC flare and R hip pain (14 Jun 2022 07:35)      SUBJECTIVE / OVERNIGHT EVENTS: JOSE LUIS  Patient seen and examined at bedside. Diarrhea improved. 1 episode nonbloody diarrhea overnight. Denies abdominal pain.   ADDITIONAL REVIEW OF SYSTEMS:    MEDICATIONS  (STANDING):  calcium carbonate   1250 mG (OsCal) 1 Tablet(s) Oral daily  cholecalciferol 1000 Unit(s) Oral daily  enoxaparin Injectable 40 milliGRAM(s) SubCutaneous every 24 hours  iron sucrose IVPB 300 milliGRAM(s) IV Intermittent every 24 hours  methylPREDNISolone sodium succinate Injectable 20 milliGRAM(s) IV Push every 8 hours  pantoprazole    Tablet 40 milliGRAM(s) Oral before breakfast    MEDICATIONS  (PRN):  acetaminophen     Tablet .. 650 milliGRAM(s) Oral every 6 hours PRN Mild Pain (1 - 3)    CAPILLARY BLOOD GLUCOSE        I&O's Summary    13 Jun 2022 07:01  -  14 Jun 2022 07:00  --------------------------------------------------------  IN: 250 mL / OUT: 0 mL / NET: 250 mL        PHYSICAL EXAM:  Vital Signs Last 24 Hrs  T(C): 36.7 (14 Jun 2022 06:06), Max: 37.2 (13 Jun 2022 09:00)  T(F): 98.1 (14 Jun 2022 06:06), Max: 98.9 (13 Jun 2022 09:00)  HR: 61 (14 Jun 2022 06:06) (61 - 93)  BP: 107/61 (14 Jun 2022 06:06) (102/61 - 112/65)  BP(mean): --  RR: 18 (14 Jun 2022 06:06) (16 - 18)  SpO2: 96% (14 Jun 2022 06:06) (95% - 98%)  CONSTITUTIONAL: NAD, well-developed, well-groomed  ENMT: Moist oral mucosa, no pharyngeal injection or exudates; normal dentition  RESPIRATORY: Normal respiratory effort; lungs are clear to auscultation bilaterally  CARDIOVASCULAR: Regular rate and rhythm, normal S1 and S2, no murmur/rub/gallop; No lower extremity edema; Peripheral pulses are 2+ bilaterally  ABDOMEN: Nontender to palpation, normoactive bowel sounds, no rebound/guarding; No hepatosplenomegaly  PSYCH: A+O to person, place, and time; affect appropriate  NEUROLOGY: CN 2-12 are intact and symmetric; no gross sensory deficits   SKIN: No rashes; no palpable lesions    LABS:                        10.6   12.67 )-----------( 446      ( 14 Jun 2022 06:58 )             33.7     06-14    140  |  102  |  12  ----------------------------<  137<H>  4.1   |  29  |  0.80    Ca    9.2      14 Jun 2022 06:58  Phos  4.1     06-14  Mg     2.0     06-14    TPro  6.5  /  Alb  3.4  /  TBili  0.2  /  DBili  x   /  AST  12  /  ALT  19  /  AlkPhos  40  06-14              GI PCR Panel, Stool (collected 11 Jun 2022 20:17)  Source: .Stool Feces  Final Report (11 Jun 2022 22:49):    GI PCR Results: NOT detected    *******Please Note:*******    GI panel PCR evaluates for:    Campylobacter, Plesiomonas shigelloides, Salmonella,    Vibrio, Yersinia enterocolitica, Enteroaggregative    Escherichia coli (EAEC), Enteropathogenic E.coli (EPEC),    Enterotoxigenic E. coli (ETEC) lt/st, Shiga-like    toxin-producing E. coli (STEC) stx1/stx2,    Shigella/ Enteroinvasive E. coli (EIEC), Cryptosporidium,    Cyclospora cayetanensis, Entamoeba histolytica,    Giardia lamblia, Adenovirus F 40/41, Astrovirus,    Norovirus GI/GII, Rotavirus A, Sapovirus    Urinalysis with Rflx Culture (collected 11 Jun 2022 17:18)      COVID-19 PCR: Negative (11 Jun 2022 17:07)      RADIOLOGY & ADDITIONAL TESTS:  Imaging from Last 24 Hours:    Electrocardiogram/QTc Interval:    COORDINATION OF CARE:  Care Discussed with Consultants/Other Providers:   Medicine Progress Note    Patient is a 27y old  Male who presents with a chief complaint of UC flare and R hip pain (14 Jun 2022 07:35)      SUBJECTIVE / OVERNIGHT EVENTS: JOSE LUIS    Patient seen and examined at bedside. Diarrhea improving with 1 episode nonbloody diarrhea overnight. Denies abdominal pain. R gluteal pain improving-6/10 pain (from 10/10). Denies fever/chills, nausea/vomiting, urinary symptoms.      ADDITIONAL REVIEW OF SYSTEMS:    MEDICATIONS  (STANDING):  calcium carbonate   1250 mG (OsCal) 1 Tablet(s) Oral daily  cholecalciferol 1000 Unit(s) Oral daily  enoxaparin Injectable 40 milliGRAM(s) SubCutaneous every 24 hours  iron sucrose IVPB 300 milliGRAM(s) IV Intermittent every 24 hours  methylPREDNISolone sodium succinate Injectable 20 milliGRAM(s) IV Push every 8 hours  pantoprazole    Tablet 40 milliGRAM(s) Oral before breakfast    MEDICATIONS  (PRN):  acetaminophen     Tablet .. 650 milliGRAM(s) Oral every 6 hours PRN Mild Pain (1 - 3)    CAPILLARY BLOOD GLUCOSE        I&O's Summary    13 Jun 2022 07:01  -  14 Jun 2022 07:00  --------------------------------------------------------  IN: 250 mL / OUT: 0 mL / NET: 250 mL        PHYSICAL EXAM:  Vital Signs Last 24 Hrs  T(C): 36.7 (14 Jun 2022 06:06), Max: 37.2 (13 Jun 2022 09:00)  T(F): 98.1 (14 Jun 2022 06:06), Max: 98.9 (13 Jun 2022 09:00)  HR: 61 (14 Jun 2022 06:06) (61 - 93)  BP: 107/61 (14 Jun 2022 06:06) (102/61 - 112/65)  BP(mean): --  RR: 18 (14 Jun 2022 06:06) (16 - 18)  SpO2: 96% (14 Jun 2022 06:06) (95% - 98%)  CONSTITUTIONAL: NAD, well-developed, well-groomed  ENMT: Moist oral mucosa, no pharyngeal injection or exudates; normal dentition  RESPIRATORY: Normal respiratory effort; lungs are clear to auscultation bilaterally  CARDIOVASCULAR: Regular rate and rhythm, normal S1 and S2, no murmur/rub/gallop; No lower extremity edema; Peripheral pulses are 2+ bilaterally  ABDOMEN: Nontender to palpation, normoactive bowel sounds, no rebound/guarding; No hepatosplenomegaly  PSYCH: A+O to person, place, and time; affect appropriate  NEUROLOGY: CN 2-12 are intact and symmetric; no gross sensory deficits, strength 5/5 bl LE  MSK: right hip/gluteal pain with internal rotation, no deformities,   SKIN: No rashes; no palpable lesions    LABS:                        10.6   12.67 )-----------( 446      ( 14 Jun 2022 06:58 )             33.7     06-14    140  |  102  |  12  ----------------------------<  137<H>  4.1   |  29  |  0.80    Ca    9.2      14 Jun 2022 06:58  Phos  4.1     06-14  Mg     2.0     06-14    TPro  6.5  /  Alb  3.4  /  TBili  0.2  /  DBili  x   /  AST  12  /  ALT  19  /  AlkPhos  40  06-14              GI PCR Panel, Stool (collected 11 Jun 2022 20:17)  Source: .Stool Feces  Final Report (11 Jun 2022 22:49):    GI PCR Results: NOT detected    *******Please Note:*******    GI panel PCR evaluates for:    Campylobacter, Plesiomonas shigelloides, Salmonella,    Vibrio, Yersinia enterocolitica, Enteroaggregative    Escherichia coli (EAEC), Enteropathogenic E.coli (EPEC),    Enterotoxigenic E. coli (ETEC) lt/st, Shiga-like    toxin-producing E. coli (STEC) stx1/stx2,    Shigella/ Enteroinvasive E. coli (EIEC), Cryptosporidium,    Cyclospora cayetanensis, Entamoeba histolytica,    Giardia lamblia, Adenovirus F 40/41, Astrovirus,    Norovirus GI/GII, Rotavirus A, Sapovirus    Urinalysis with Rflx Culture (collected 11 Jun 2022 17:18)      COVID-19 PCR: Negative (11 Jun 2022 17:07)      RADIOLOGY & ADDITIONAL TESTS:  Imaging from Last 24 Hours:    Electrocardiogram/QTc Interval:    COORDINATION OF CARE:  Care Discussed with Consultants/Other Providers:   Hospital Course  Patient is 26yo M with past medical history of ulcerative colitis, questionable psych disorder/possible BPD, chronic demyleinating polyneuropathy   Presented with diarrhea, hip pain and fecal incontinence admitted for UC flare. Seen by neurology in ED for fecal incontinence due to concern for cord compression which was ruled out, incontinence attributed to UC flare. Seen by GI for UC flare, started on IV solumedrol 20mg q8 per GI with clinicaly improvement and decrease in daily CRPs. MRI L and Hip ordered for R hip pain due to concern for avascular necrosis vs sacroilitis i.s.o UC flare which was found to be negative but notable for a labrum tear. Plan to change steroids from PO to IV for the UC flare and plan for PT to evaluate pt for the labrum tear.      Medicine Progress Note    Patient is a 27y old  Male who presents with a chief complaint of UC flare and R hip pain (14 Jun 2022 07:35)      SUBJECTIVE / OVERNIGHT EVENTS: JOSE LUIS    Patient seen and examined at bedside. Diarrhea improving with 1 episode nonbloody diarrhea overnight. Denies abdominal pain. R gluteal pain improving-6/10 pain (from 10/10). Denies fever/chills, nausea/vomiting, urinary symptoms.      ADDITIONAL REVIEW OF SYSTEMS:    MEDICATIONS  (STANDING):  calcium carbonate   1250 mG (OsCal) 1 Tablet(s) Oral daily  cholecalciferol 1000 Unit(s) Oral daily  enoxaparin Injectable 40 milliGRAM(s) SubCutaneous every 24 hours  iron sucrose IVPB 300 milliGRAM(s) IV Intermittent every 24 hours  methylPREDNISolone sodium succinate Injectable 20 milliGRAM(s) IV Push every 8 hours  pantoprazole    Tablet 40 milliGRAM(s) Oral before breakfast    MEDICATIONS  (PRN):  acetaminophen     Tablet .. 650 milliGRAM(s) Oral every 6 hours PRN Mild Pain (1 - 3)    CAPILLARY BLOOD GLUCOSE        I&O's Summary    13 Jun 2022 07:01  -  14 Jun 2022 07:00  --------------------------------------------------------  IN: 250 mL / OUT: 0 mL / NET: 250 mL        PHYSICAL EXAM:  Vital Signs Last 24 Hrs  T(C): 36.7 (14 Jun 2022 06:06), Max: 37.2 (13 Jun 2022 09:00)  T(F): 98.1 (14 Jun 2022 06:06), Max: 98.9 (13 Jun 2022 09:00)  HR: 61 (14 Jun 2022 06:06) (61 - 93)  BP: 107/61 (14 Jun 2022 06:06) (102/61 - 112/65)  BP(mean): --  RR: 18 (14 Jun 2022 06:06) (16 - 18)  SpO2: 96% (14 Jun 2022 06:06) (95% - 98%)  CONSTITUTIONAL: NAD, well-developed, well-groomed  ENMT: Moist oral mucosa, no pharyngeal injection or exudates; normal dentition  RESPIRATORY: Normal respiratory effort; lungs are clear to auscultation bilaterally  CARDIOVASCULAR: Regular rate and rhythm, normal S1 and S2, no murmur/rub/gallop; No lower extremity edema; Peripheral pulses are 2+ bilaterally  ABDOMEN: Nontender to palpation, normoactive bowel sounds, no rebound/guarding; No hepatosplenomegaly  PSYCH: A+O to person, place, and time; affect appropriate  NEUROLOGY: CN 2-12 are intact and symmetric; no gross sensory deficits, strength 5/5 bl LE  MSK: right hip/gluteal pain with internal rotation, no deformities,   SKIN: No rashes; no palpable lesions    LABS:                        10.6   12.67 )-----------( 446      ( 14 Jun 2022 06:58 )             33.7     06-14    140  |  102  |  12  ----------------------------<  137<H>  4.1   |  29  |  0.80    Ca    9.2      14 Jun 2022 06:58  Phos  4.1     06-14  Mg     2.0     06-14    TPro  6.5  /  Alb  3.4  /  TBili  0.2  /  DBili  x   /  AST  12  /  ALT  19  /  AlkPhos  40  06-14              GI PCR Panel, Stool (collected 11 Jun 2022 20:17)  Source: .Stool Feces  Final Report (11 Jun 2022 22:49):    GI PCR Results: NOT detected    *******Please Note:*******    GI panel PCR evaluates for:    Campylobacter, Plesiomonas shigelloides, Salmonella,    Vibrio, Yersinia enterocolitica, Enteroaggregative    Escherichia coli (EAEC), Enteropathogenic E.coli (EPEC),    Enterotoxigenic E. coli (ETEC) lt/st, Shiga-like    toxin-producing E. coli (STEC) stx1/stx2,    Shigella/ Enteroinvasive E. coli (EIEC), Cryptosporidium,    Cyclospora cayetanensis, Entamoeba histolytica,    Giardia lamblia, Adenovirus F 40/41, Astrovirus,    Norovirus GI/GII, Rotavirus A, Sapovirus    Urinalysis with Rflx Culture (collected 11 Jun 2022 17:18)      COVID-19 PCR: Negative (11 Jun 2022 17:07)      RADIOLOGY & ADDITIONAL TESTS:  Imaging from Last 24 Hours:    Electrocardiogram/QTc Interval:    COORDINATION OF CARE:  Care Discussed with Consultants/Other Providers:

## 2022-06-14 NOTE — PROGRESS NOTE ADULT - SUBJECTIVE AND OBJECTIVE BOX
GASTROENTEROLOGY PROGRESS NOTE  Patient seen and examined at bedside.  Overall doing better on steroids; 1 bm overnight and 1 this am, reduced from yesterday. Last BM w/o rectal bleeding.  CRP 12    PERTINENT REVIEW OF SYSTEMS:  CONSTITUTIONAL: No weakness, fevers or chills  HEENT: No visual changes; No vertigo or throat pain   GASTROINTESTINAL: As above.  NEUROLOGICAL: No numbness or weakness  SKIN: No itching, burning, rashes, or lesions     Allergies    Seroquel (Other)    Intolerances      MEDICATIONS:  MEDICATIONS  (STANDING):  calcium carbonate   1250 mG (OsCal) 1 Tablet(s) Oral daily  cholecalciferol 1000 Unit(s) Oral daily  enoxaparin Injectable 40 milliGRAM(s) SubCutaneous every 24 hours  methylPREDNISolone sodium succinate Injectable 20 milliGRAM(s) IV Push every 8 hours  pantoprazole    Tablet 40 milliGRAM(s) Oral before breakfast    MEDICATIONS  (PRN):  acetaminophen     Tablet .. 650 milliGRAM(s) Oral every 6 hours PRN Mild Pain (1 - 3)    Vital Signs Last 24 Hrs  T(C): 36.7 (14 Jun 2022 16:25), Max: 37.1 (13 Jun 2022 20:47)  T(F): 98.1 (14 Jun 2022 16:25), Max: 98.7 (13 Jun 2022 20:47)  HR: 70 (14 Jun 2022 16:25) (58 - 82)  BP: 107/56 (14 Jun 2022 16:25) (102/61 - 112/60)  BP(mean): --  RR: 17 (14 Jun 2022 16:25) (16 - 18)  SpO2: 97% (14 Jun 2022 16:25) (96% - 98%)    06-13 @ 07:01 - 06-14 @ 07:00  --------------------------------------------------------  IN: 250 mL / OUT: 0 mL / NET: 250 mL    06-14 @ 07:01 - 06-14 @ 16:35  --------------------------------------------------------  IN: 250 mL / OUT: 0 mL / NET: 250 mL      PHYSICAL EXAM:    General: in no acute distress  HEENT: MMM, conjunctiva and sclera clear  Gastrointestinal: Soft non-tender non-distended; No rebound or guarding  Skin: Warm and dry. No obvious rash    LABS:                        10.6   12.67 )-----------( 446      ( 14 Jun 2022 06:58 )             33.7     06-14    140  |  102  |  12  ----------------------------<  137<H>  4.1   |  29  |  0.80    Ca    9.2      14 Jun 2022 06:58  Phos  4.1     06-14  Mg     2.0     06-14    TPro  6.5  /  Alb  3.4  /  TBili  0.2  /  DBili  x   /  AST  12  /  ALT  19  /  AlkPhos  40  06-14                      GI PCR Panel, Stool (collected 11 Jun 2022 20:17)  Source: .Stool Feces  Final Report (11 Jun 2022 22:49):    GI PCR Results: NOT detected    *******Please Note:*******    GI panel PCR evaluates for:    Campylobacter, Plesiomonas shigelloides, Salmonella,    Vibrio, Yersinia enterocolitica, Enteroaggregative    Escherichia coli (EAEC), Enteropathogenic E.coli (EPEC),    Enterotoxigenic E. coli (ETEC) lt/st, Shiga-like    toxin-producing E. coli (STEC) stx1/stx2,    Shigella/ Enteroinvasive E. coli (EIEC), Cryptosporidium,    Cyclospora cayetanensis, Entamoeba histolytica,    Giardia lamblia, Adenovirus F 40/41, Astrovirus,    Norovirus GI/GII, Rotavirus A, Sapovirus    Urinalysis with Rflx Culture (collected 11 Jun 2022 17:18)      RADIOLOGY & ADDITIONAL STUDIES:  Reviewed

## 2022-06-14 NOTE — CHART NOTE - NSCHARTNOTEFT_GEN_A_CORE
The patient was seen today   His been is better and improving steadily after starting steroids  His hip MR showed trace of effusion in the Rt hip. This may explain the patient pain and consistent with improvement on steroids.     No further intervention is needed from neurology point of view  Case discussed with Dr. Miller   Neurology is sining off

## 2022-06-14 NOTE — PHYSICAL THERAPY INITIAL EVALUATION ADULT - ADDITIONAL COMMENTS
Pt lives in an Pt lives in an apartment with 4 LEY[place]. Pt has been using a cane for ~2 weeks. Pt lives in an apartment with 4 [place]. Pt has been using a cane for ~2 weeks and has a tub shower. Pt's mom reports he also has access to walk in shower.

## 2022-06-14 NOTE — PHYSICAL THERAPY INITIAL EVALUATION ADULT - GENERAL OBSERVATIONS, REHAB EVAL
PT IE Completed. Pt received semi-supine in bed, A&Ox4, +RA,+heplock, in NAD and agreeable to work with PT, WEI Adams notified. Pt left semi-supine, WEI Adams notified, bed alarm, +call bell within reach. Pt is discharged from PT program at this time. Should patient status change, new PT consult is recommended.

## 2022-06-15 ENCOUNTER — TRANSCRIPTION ENCOUNTER (OUTPATIENT)
Age: 27
End: 2022-06-15

## 2022-06-15 VITALS
DIASTOLIC BLOOD PRESSURE: 64 MMHG | TEMPERATURE: 98 F | HEART RATE: 60 BPM | RESPIRATION RATE: 19 BRPM | SYSTOLIC BLOOD PRESSURE: 107 MMHG | OXYGEN SATURATION: 98 %

## 2022-06-15 LAB
ALBUMIN SERPL ELPH-MCNC: 3.4 G/DL — SIGNIFICANT CHANGE UP (ref 3.3–5)
ALP SERPL-CCNC: 42 U/L — SIGNIFICANT CHANGE UP (ref 40–120)
ALT FLD-CCNC: 21 U/L — SIGNIFICANT CHANGE UP (ref 10–45)
ANION GAP SERPL CALC-SCNC: 8 MMOL/L — SIGNIFICANT CHANGE UP (ref 5–17)
AST SERPL-CCNC: 14 U/L — SIGNIFICANT CHANGE UP (ref 10–40)
BILIRUB SERPL-MCNC: 0.2 MG/DL — SIGNIFICANT CHANGE UP (ref 0.2–1.2)
BLD GP AB SCN SERPL QL: NEGATIVE — SIGNIFICANT CHANGE UP
BUN SERPL-MCNC: 17 MG/DL — SIGNIFICANT CHANGE UP (ref 7–23)
CALCIUM SERPL-MCNC: 9.1 MG/DL — SIGNIFICANT CHANGE UP (ref 8.4–10.5)
CHLORIDE SERPL-SCNC: 104 MMOL/L — SIGNIFICANT CHANGE UP (ref 96–108)
CO2 SERPL-SCNC: 28 MMOL/L — SIGNIFICANT CHANGE UP (ref 22–31)
CREAT SERPL-MCNC: 0.66 MG/DL — SIGNIFICANT CHANGE UP (ref 0.5–1.3)
CRP SERPL-MCNC: 7.8 MG/L — HIGH (ref 0–4)
EGFR: 133 ML/MIN/1.73M2 — SIGNIFICANT CHANGE UP
GLUCOSE SERPL-MCNC: 124 MG/DL — HIGH (ref 70–99)
HCT VFR BLD CALC: 35.2 % — LOW (ref 39–50)
HGB BLD-MCNC: 11.1 G/DL — LOW (ref 13–17)
MAGNESIUM SERPL-MCNC: 2.1 MG/DL — SIGNIFICANT CHANGE UP (ref 1.6–2.6)
MCHC RBC-ENTMCNC: 26.1 PG — LOW (ref 27–34)
MCHC RBC-ENTMCNC: 31.5 GM/DL — LOW (ref 32–36)
MCV RBC AUTO: 82.6 FL — SIGNIFICANT CHANGE UP (ref 80–100)
NRBC # BLD: 0 /100 WBCS — SIGNIFICANT CHANGE UP (ref 0–0)
PHOSPHATE SERPL-MCNC: 3.6 MG/DL — SIGNIFICANT CHANGE UP (ref 2.5–4.5)
PLATELET # BLD AUTO: 462 K/UL — HIGH (ref 150–400)
POTASSIUM SERPL-MCNC: 4.3 MMOL/L — SIGNIFICANT CHANGE UP (ref 3.5–5.3)
POTASSIUM SERPL-SCNC: 4.3 MMOL/L — SIGNIFICANT CHANGE UP (ref 3.5–5.3)
PROT SERPL-MCNC: 6.2 G/DL — SIGNIFICANT CHANGE UP (ref 6–8.3)
RBC # BLD: 4.26 M/UL — SIGNIFICANT CHANGE UP (ref 4.2–5.8)
RBC # FLD: 11.8 % — SIGNIFICANT CHANGE UP (ref 10.3–14.5)
RH IG SCN BLD-IMP: POSITIVE — SIGNIFICANT CHANGE UP
SODIUM SERPL-SCNC: 140 MMOL/L — SIGNIFICANT CHANGE UP (ref 135–145)
WBC # BLD: 15.35 K/UL — HIGH (ref 3.8–10.5)
WBC # FLD AUTO: 15.35 K/UL — HIGH (ref 3.8–10.5)

## 2022-06-15 PROCEDURE — 84100 ASSAY OF PHOSPHORUS: CPT

## 2022-06-15 PROCEDURE — 83735 ASSAY OF MAGNESIUM: CPT

## 2022-06-15 PROCEDURE — 86900 BLOOD TYPING SEROLOGIC ABO: CPT

## 2022-06-15 PROCEDURE — 84466 ASSAY OF TRANSFERRIN: CPT

## 2022-06-15 PROCEDURE — 87340 HEPATITIS B SURFACE AG IA: CPT

## 2022-06-15 PROCEDURE — 87324 CLOSTRIDIUM AG IA: CPT

## 2022-06-15 PROCEDURE — 86140 C-REACTIVE PROTEIN: CPT

## 2022-06-15 PROCEDURE — 86706 HEP B SURFACE ANTIBODY: CPT

## 2022-06-15 PROCEDURE — 85610 PROTHROMBIN TIME: CPT

## 2022-06-15 PROCEDURE — 86480 TB TEST CELL IMMUN MEASURE: CPT

## 2022-06-15 PROCEDURE — 85652 RBC SED RATE AUTOMATED: CPT

## 2022-06-15 PROCEDURE — 99239 HOSP IP/OBS DSCHRG MGMT >30: CPT | Mod: GC

## 2022-06-15 PROCEDURE — 99232 SBSQ HOSP IP/OBS MODERATE 35: CPT

## 2022-06-15 PROCEDURE — 83550 IRON BINDING TEST: CPT

## 2022-06-15 PROCEDURE — 86850 RBC ANTIBODY SCREEN: CPT

## 2022-06-15 PROCEDURE — 82550 ASSAY OF CK (CPK): CPT

## 2022-06-15 PROCEDURE — 87449 NOS EACH ORGANISM AG IA: CPT

## 2022-06-15 PROCEDURE — 80053 COMPREHEN METABOLIC PANEL: CPT

## 2022-06-15 PROCEDURE — 85027 COMPLETE CBC AUTOMATED: CPT

## 2022-06-15 PROCEDURE — 87389 HIV-1 AG W/HIV-1&-2 AB AG IA: CPT

## 2022-06-15 PROCEDURE — A9585: CPT

## 2022-06-15 PROCEDURE — 36415 COLL VENOUS BLD VENIPUNCTURE: CPT

## 2022-06-15 PROCEDURE — 74177 CT ABD & PELVIS W/CONTRAST: CPT | Mod: MA

## 2022-06-15 PROCEDURE — 87635 SARS-COV-2 COVID-19 AMP PRB: CPT

## 2022-06-15 PROCEDURE — 86901 BLOOD TYPING SEROLOGIC RH(D): CPT

## 2022-06-15 PROCEDURE — 86705 HEP B CORE ANTIBODY IGM: CPT

## 2022-06-15 PROCEDURE — 97161 PT EVAL LOW COMPLEX 20 MIN: CPT

## 2022-06-15 PROCEDURE — 85730 THROMBOPLASTIN TIME PARTIAL: CPT

## 2022-06-15 PROCEDURE — 99285 EMERGENCY DEPT VISIT HI MDM: CPT

## 2022-06-15 PROCEDURE — 81003 URINALYSIS AUTO W/O SCOPE: CPT

## 2022-06-15 PROCEDURE — 83540 ASSAY OF IRON: CPT

## 2022-06-15 PROCEDURE — 82728 ASSAY OF FERRITIN: CPT

## 2022-06-15 PROCEDURE — 85025 COMPLETE CBC W/AUTO DIFF WBC: CPT

## 2022-06-15 PROCEDURE — 72158 MRI LUMBAR SPINE W/O & W/DYE: CPT

## 2022-06-15 PROCEDURE — 87507 IADNA-DNA/RNA PROBE TQ 12-25: CPT

## 2022-06-15 PROCEDURE — 73721 MRI JNT OF LWR EXTRE W/O DYE: CPT

## 2022-06-15 RX ORDER — PANTOPRAZOLE SODIUM 20 MG/1
1 TABLET, DELAYED RELEASE ORAL
Qty: 30 | Refills: 3
Start: 2022-06-15 | End: 2022-10-12

## 2022-06-15 RX ORDER — MESALAMINE 400 MG
60 TABLET, DELAYED RELEASE (ENTERIC COATED) ORAL
Qty: 0 | Refills: 0 | DISCHARGE

## 2022-06-15 RX ORDER — MESALAMINE 400 MG
4 TABLET, DELAYED RELEASE (ENTERIC COATED) ORAL
Qty: 0 | Refills: 0 | DISCHARGE

## 2022-06-15 RX ORDER — FERROUS SULFATE 325(65) MG
1 TABLET ORAL
Qty: 30 | Refills: 0
Start: 2022-06-15 | End: 2022-07-14

## 2022-06-15 RX ORDER — BUDESONIDE, MICRONIZED 100 %
0 POWDER (GRAM) MISCELLANEOUS
Qty: 0 | Refills: 0 | DISCHARGE

## 2022-06-15 RX ORDER — CHOLECALCIFEROL (VITAMIN D3) 125 MCG
1000 CAPSULE ORAL
Qty: 30 | Refills: 3
Start: 2022-06-15 | End: 2022-10-12

## 2022-06-15 RX ORDER — CALCIUM CARBONATE 500(1250)
1 TABLET ORAL
Qty: 30 | Refills: 3
Start: 2022-06-15 | End: 2022-10-12

## 2022-06-15 RX ADMIN — Medication 1000 UNIT(S): at 11:51

## 2022-06-15 RX ADMIN — Medication 1 TABLET(S): at 13:43

## 2022-06-15 RX ADMIN — Medication 20 MILLIGRAM(S): at 06:13

## 2022-06-15 RX ADMIN — Medication 60 MILLIGRAM(S): at 13:28

## 2022-06-15 RX ADMIN — PANTOPRAZOLE SODIUM 40 MILLIGRAM(S): 20 TABLET, DELAYED RELEASE ORAL at 06:13

## 2022-06-15 NOTE — PROGRESS NOTE ADULT - ASSESSMENT
27M PMH UC diagnosed 01/2022 (currently on mesalamine + budesonide 9), unclear psychiatric disorder (follows with psych), Lyme disease (treated with prolonged doxy), chronic inflammatory demyelinating polyneuritis presenting with chronic diarrhea, incontinence, and recent R hip pain, admitted for UC flare and hip pain workup. Now s/p IV solumedrol 20 q8 x 3 days with improvement in symptoms and crp (26 -> 7.8).    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 Declined recommendation to start a biologic as well as prednisone outpatient due to concern for possibility would worsen psychiatric illness.  - Prior to admission was having ~10BM/ day with incontinence and streaks of blood.   - CRP 26 on admission, now 7.8  - CT: circumferential contiguous wall thickening involving entire colon, fluid filled colon c/w diarrheal illness  - GI PCR and CDiff neg  - Discussed r/b/i/a of IV steroids with patient, who agrees to trial of IV steroids  - Recommended surgical consultation to establish care; patient declined at this time  - Plan to start Entivyo outpatient. Discussed patient, family and primary GI Dr. Smith. Hep B serologies negative. Pending quant gold.     Recommendations:  - Daily cbc, bmp, crp  - F/u QuantiFeron gold   - Transition to Prednisone 60mg PO today. Continue 60mg tomorrow and then start taper by 5mg per day every 5 days (ie Friday 55mg). Taper to be followed up outpatient. Hold taper if symptoms return and resume at previous dose.   - Vit D/ Calcium while on steroids  - DVT ppx    Case d/w primary team and IBD attending   Plan communicated with Dr. Luis Barlow MD  Gastroenterology Fellow, PGY -4   Pager [phone]  [phone] 27M PMH UC diagnosed 01/2022 (currently on mesalamine + budesonide 9), unclear psychiatric disorder (follows with psych), Lyme disease (treated with prolonged doxy), chronic inflammatory demyelinating polyneuritis presenting with chronic diarrhea, incontinence, and recent R hip pain, admitted for UC flare and hip pain workup. Now s/p IV solumedrol 20 q8 x 3 days with improvement in symptoms and crp (26 -> 7.8).    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 Declined recommendation to start a biologic as well as prednisone outpatient due to concern for possibility would worsen psychiatric illness.  - Prior to admission was having ~10BM/ day with incontinence and streaks of blood.   - CRP 26 on admission, now 7.8  - CT: circumferential contiguous wall thickening involving entire colon, fluid filled colon c/w diarrheal illness  - GI PCR and CDiff neg  - Discussed r/b/i/a of IV steroids with patient, who agrees to trial of IV steroids  - Recommended surgical consultation to establish care; patient declined at this time  - Plan to start Entivyo outpatient. Discussed patient, family and primary GI Dr. Smtih. Hep B serologies negative. Pending quant gold.     Recommendations:  - Daily cbc, bmp, crp  - F/u QuantiFeron gold   - Transition to Prednisone 60mg PO today. Continue 60mg tomorrow and then start taper by 5mg per day every 5 days (ie Friday 55mg). Taper to be followed up outpatient. Hold taper if symptoms return and resume at previous dose.   - Vit D/ Calcium while on steroids  - DVT ppx  - Can d/c home mesalamine    Case d/w primary team and IBD attending   Plan communicated with Dr. Luis Barlow MD  Gastroenterology Fellow, PGY -4   Pager [phone]  [phone]

## 2022-06-15 NOTE — DISCHARGE NOTE NURSING/CASE MANAGEMENT/SOCIAL WORK - NSDCPEFALRISK_GEN_ALL_CORE
For information on Fall & Injury Prevention, visit: [email or web address] OR  [email or web address] OR  https://www.cdc.gov/steadi/patient.html

## 2022-06-15 NOTE — DISCHARGE NOTE NURSING/CASE MANAGEMENT/SOCIAL WORK - NSDCFUADDAPPT_GEN_ALL_CORE_FT
Toro Geronimo [phone]    Please follow up with your Gastroenterologist Dr. Jayden Smith on Tuesday July 5th at 11AM

## 2022-06-15 NOTE — PROGRESS NOTE ADULT - SUBJECTIVE AND OBJECTIVE BOX
**INCOMPLETE NOTE    OVERNIGHT EVENTS: JOSE LUIS.    SUBJECTIVE:  Patient seen and examined at bedside. Reports 3 BM overnight, more formed, nonbloody. Denies abdominal pain. Hip pain improving, 2/10 at rest. Improvement in pain with ambulation. No nausea/vomiting, fever/chills.    Vital Signs Last 12 Hrs  T(F): 98.3 (06-15-22 @ 06:08), Max: 98.3 (06-15-22 @ 06:08)  HR: 64 (06-15-22 @ 06:08) (64 - 69)  BP: 103/54 (06-15-22 @ 06:08) (103/54 - 103/59)  BP(mean): --  RR: 19 (06-15-22 @ 06:08) (18 - 19)  SpO2: 98% (06-15-22 @ 06:08) (97% - 98%)  I&O's Summary    14 Jun 2022 07:01  -  15 Cristobal 2022 07:00  --------------------------------------------------------  IN: 250 mL / OUT: 0 mL / NET: 250 mL        PHYSICAL EXAM:  Constitutional: NAD, comfortable in bed.  HEENT: NC/AT, PERRLA, EOMI, no conjunctival pallor or scleral icterus, MMM  Neck: Supple, no JVD  Respiratory: CTA B/L. No w/r/r.   Cardiovascular: RRR, normal S1 and S2, no m/r/g.   Gastrointestinal: +BS, soft NTND, no guarding or rebound tenderness, no palpable masses   Extremities: wwp; no cyanosis, clubbing or edema.   Vascular: Pulses equal and strong throughout.   Neurological: AAOx3, no CN deficits, strength and sensation intact throughout.  MSK: R gluteal pain with internal rotation. No deformities.  Skin: No gross skin abnormalities or rashes        LABS:                        11.1   15.35 )-----------( 462      ( 15 Cristobal 2022 06:38 )             35.2     06-15    140  |  104  |  17  ----------------------------<  124<H>  4.3   |  28  |  0.66    Ca    9.1      15 Cristobal 2022 06:38  Phos  3.6     06-15  Mg     2.1     06-15    TPro  6.2  /  Alb  3.4  /  TBili  0.2  /  DBili  x   /  AST  14  /  ALT  21  /  AlkPhos  42  06-15            RADIOLOGY & ADDITIONAL TESTS:    MEDICATIONS  (STANDING):  calcium carbonate   1250 mG (OsCal) 1 Tablet(s) Oral daily  cholecalciferol 1000 Unit(s) Oral daily  enoxaparin Injectable 40 milliGRAM(s) SubCutaneous every 24 hours  methylPREDNISolone sodium succinate Injectable 20 milliGRAM(s) IV Push every 8 hours  pantoprazole    Tablet 40 milliGRAM(s) Oral before breakfast    MEDICATIONS  (PRN):  acetaminophen     Tablet .. 650 milliGRAM(s) Oral every 6 hours PRN Mild Pain (1 - 3)

## 2022-06-15 NOTE — PROGRESS NOTE ADULT - PROBLEM SELECTOR PLAN 5
F: LR @ 90cc/hr  E: replete prn  N: regular diet  DVT ppx: SCDs as low risk  Code status: full code
F: None  E: replete prn  N: regular diet  DVT ppx: lovenox 40   Code status: full code
F: None  E: replete prn  N: regular diet  DVT ppx: lovenox 40  Code status: full code
Plan: F: None  E: replete prn  N: regular diet  DVT ppx: lovenox 40   Code status: full code.

## 2022-06-15 NOTE — DISCHARGE NOTE NURSING/CASE MANAGEMENT/SOCIAL WORK - NSCORESITESY/N_GEN_A_CORE_RD
"""Reassured patient that intraocular pressures (IOPs) are at target levels and other ocular findings are stable. Continue present management and/or medication(s).  Follow up as directed. """ No

## 2022-06-15 NOTE — PROGRESS NOTE ADULT - ATTENDING COMMENTS
UC flare with extension to pancolitis  First hospitalization for flare req. steroids.   Open to biologics.   Has psych history (?depression vs bipolar) - so far steroids haven't been an issue  maybe a little better on 12hr of iv steroids.   discussed options if better on steroids --> PO to vedo  If incomp response on iv steroids --> IFX  Prebiologic testing and r/b/a/i d/w pt  reasonable to have colorectal surgery meet the patient  Trend CRP
Admitted for Severe Acute Ulcerative colitis, started on IV steroids with response, decrease in stool frequency, rectal bleeding and improvement in stool consistency. CRP decreased to 7.8. Patient reports feeling better, decreased tenderness on palpation.   Plan to continue steroids with plan to transition to PO upon discharge and then taper. Patient will follow with his gastroenterologist for initiation of biologic therapy.   Patient and family updated.   Rest plan as per fellow note
I was physically present for the key portions of the evaluation and managemnent (E/M) service provided.  I agree with the above history, physical, and plan which I have reviewed and edited where appropriate, with the exceptions as per my note.    pt seen and examined.    his pain has lessened with steroids.    continues to have normal neuro exam.     MRI L spine unrevealing.    awaiting MR of pelvis.
27M PMH UC diagnosed 01/2022, unclear psychiatric disorder (follows with psych), and chronic inflammatory demyelinating polyneuritis presenting with chronic diarrhea, recent R hip pain, and fecal incontinence admitted for UC flare and hip pain workup    Patient reports only 1 episode of non bloody diarrhea overnight, overall feels improving. Hip pain more controlled.    #UC flare  - c/w IV steroids today and will transition to PO tomorrow  - GI following, appreciate recs    #Leukocytosis  -likely due to steroid initiation  - infectious diarrhea workup negative     #BRIJESH  - iron supplementation    #R hip pain due to labrum tear  - MR hip with labral tear, will consult PT.  Avoiding NSAIDs for pain, improving on steroids  - no avascular necrosis on imaging    Remainder of plan per resident note above  Plan for dc tomorrow on PO steroids after PT eval
Ulcerative Colitis Flare - start iv steroids per GI , reassured  patient that we can manage steroid induced psychosis if it occurs during hospitalization  Right hip Pain  - he only got 10 days worth of budesonide low likely clement of AVN , ? IBD related reactive arthritis/sacroilitis - MRI pelvis
27M PMH UC diagnosed 01/2022, unclear psychiatric disorder (follows with psych), and chronic inflammatory demyelinating polyneuritis presenting with chronic diarrhea, recent R hip pain, and fecal incontinence admitted for UC flare and hip pain workup    #UC flare  - c/w IV steroids, diarrhea improving per patient  - infectious workup negative  - GI following, appreciate recs    #Leukocytosis  -likely due to steroid initiation    #BRIJESH  - iron supplementation    #R hip pain  - pain sounds neurologic in nature, shoots down R leg occasionally and known hx of CIDP  - pending MR hip given hx of steroids, risk for avascular necrosis    Remainder of plan per resident note above  Potential plan for dc tomorrow if diarrhea remains improved

## 2022-06-15 NOTE — PROGRESS NOTE ADULT - ASSESSMENT
· Assessment	  27M PMH UC diagnosed 01/2022, unclear psychiatric disorder (follows with psych), and chronic inflammatory demyelinating polyneuritis presenting with chronic diarrhea, recent R hip pain, and fecal incontinence admitted for UC flare and hip pain workup.      Problem/Plan - 1:  ·  Problem: Ulcerative colitis.   ·  Plan: Having less frequent BMs, no rectal bleeding. No abdominal pain    Plan:  - c/w solumedrol 20 q8 per GI  - Likely one more day on IV steroids- can transition to PO tomorrow per GI  - regular diet   - CRP with AM labs  - calcium and vitamin D while on steroids  - f/u GI reccs.     Problem/Plan - 2:  ·  Problem: Hip pain, right.   ·  Plan: MR pelvis showing labrum tear. Pain improved with steroids. No c/f AVN    Plan:  - f/u PT  - tylenol for mild pain.     Problem/Plan - 3:  ·  Problem: Anemia.   ·  Plan: Fe studies suggestive of BRIJESH    Plan:  - c/w IV iron sucrose 300 qd X 3 (3rd dose today)  - maintain active T+S  - monitor daily CBC  - PO iron on DC.     Problem/Plan - 4:  ·  Problem: Psychiatric disorder.   ·  Plan: Hx of possible BPD. No mood symptoms at present    - monitor.     Problem/Plan - 5:  ·  Problem: Nutrition, metabolism, and development symptoms.   ·  Plan: F: None  E: replete prn  N: regular diet  DVT ppx: lovenox 40   Code status: full code. · Assessment	  27M PMH UC diagnosed 01/2022, unclear psychiatric disorder (follows with psych), and chronic inflammatory demyelinating polyneuritis presenting with chronic diarrhea, recent R hip pain, and fecal incontinence admitted for UC flare and hip pain workup.      Problem/Plan - 1:  ·  Problem: Ulcerative colitis.   ·  Plan: Having less frequent BMs, no rectal bleeding. No abdominal pain    Plan:  - c/w solumedrol 20 q8 per GI   - Likely one more day on IV steroids- can transition to PO tomorrow per GI   - regular diet   - CRP with AM labs  - calcium and vitamin D while on steroids  - f/u GI reccs.     Problem/Plan - 2:  ·  Problem: Hip pain, right.   ·  Plan: MR pelvis showing labrum tear. Pain improved with steroids. No c/f AVN    Plan:  - f/u PT  - tylenol for mild pain.     Problem/Plan - 3:  ·  Problem: Anemia.   ·  Plan: Fe studies suggestive of BRIJESH    Plan:  - c/w IV iron sucrose 300 qd X 3 (3rd dose today)  - maintain active T+S  - monitor daily CBC  - PO iron on DC.     Problem/Plan - 4:  ·  Problem: Psychiatric disorder.   ·  Plan: Hx of possible BPD. No mood symptoms at present    - monitor.     Problem/Plan - 5:  ·  Problem: Nutrition, metabolism, and development symptoms.   ·  Plan: F: None  E: replete prn  N: regular diet  DVT ppx: lovenox 40   Code status: full code. · Assessment	  27M PMH UC diagnosed 01/2022, unclear psychiatric disorder (follows with psych), and chronic inflammatory demyelinating polyneuritis presenting with chronic diarrhea, recent R hip pain, and fecal incontinence admitted for UC flare and hip pain workup.      Problem/Plan - 1:  ·  Problem: Ulcerative colitis.   ·  Plan: Having less frequent BMs, no rectal bleeding. No abdominal pain    Plan:  - c/w solumedrol 20 q8 per GI   - per GI, transition to PO prednisone 60 qd, prednisone taper  - regular diet   - CRP with AM labs  - calcium and vitamin D while on steroids  - f/u GI reccs.     Problem/Plan - 2:  ·  Problem: Hip pain, right.   ·  Plan: MR pelvis showing labrum tear. Pain improved with steroids. No c/f AVN    Plan:  - outpatient PT  - tylenol for mild pain     Problem/Plan - 3:  ·  Problem: Anemia.   ·  Plan: Fe studies suggestive of BRIJESH    Plan:  - c/w IV iron sucrose 300 qd X 3 (3rd dose today)  - maintain active T+S  - monitor daily CBC  - PO iron on DC.     Problem/Plan - 4:  ·  Problem: Psychiatric disorder.   ·  Plan: Hx of possible BPD. No mood symptoms at present    - monitor.     Problem/Plan - 5:  ·  Problem: Nutrition, metabolism, and development symptoms.   ·  Plan: F: None  E: replete prn  N: regular diet  DVT ppx: lovenox 40   Code status: full code. · Assessment	  27M PMH UC diagnosed 01/2022, unclear psychiatric disorder (follows with psych), and chronic inflammatory demyelinating polyneuritis presenting with chronic diarrhea, recent R hip pain, and fecal incontinence admitted for UC flare and hip pain workup.

## 2022-06-15 NOTE — PROGRESS NOTE ADULT - PROBLEM SELECTOR PLAN 4
Pt with hx of psychiatric disorder with episodes of rage. Has been called BPD in the past but mother says that different psychiatrists have not been able to agree on a diagnosis and that there may be from Lyme or autoimmune etiology. Reports that symptoms of tuyet and rage have appeared to be well-controlled with last psychiatric hospitalization in 2017. Pt says that rage normally is exhibited towards his mother who is his primary care giver. In the ED, pt was pleasant to provider but was often short with his mother.  - per pt's former psychiatrist, pt has rage issues with delusions of mother causing his medical issues. psychiatrist has signed off due to unhealthy relationship between mother and patient.   - rage outbursts tend to be more verbal than physical  - refuses psych meds  Plan:  - currently denies SI/HI or other issues with mood  - will avoid antipsychotics given hx of tardive dyskinesia  - if agitated during stay, can try ativan or benadryl but if not working former psychiatrist says that short course of sedating antipsychotic wouldn't be unreasonable
Hx of possible BPD. No mood symptoms at present    - monitor
Hx of possible BPD. No mood symptoms at present    - monitor
·  Plan: Hx of possible BPD. No mood symptoms at present    - monitor.

## 2022-06-15 NOTE — PROGRESS NOTE ADULT - SUBJECTIVE AND OBJECTIVE BOX
GASTROENTEROLOGY PROGRESS NOTE  Patient seen and examined at bedside.  CRP 7.8, cont to downtrend  3 non bloody BM overnight, more formed  Hip pain improved    PERTINENT REVIEW OF SYSTEMS:  CONSTITUTIONAL: No weakness, fevers or chills  HEENT: No visual changes; No vertigo or throat pain   GASTROINTESTINAL: As above.  NEUROLOGICAL: No numbness or weakness  SKIN: No itching, burning, rashes, or lesions     Allergies    Seroquel (Other)    Intolerances      MEDICATIONS:  MEDICATIONS  (STANDING):  calcium carbonate   1250 mG (OsCal) 1 Tablet(s) Oral daily  cholecalciferol 1000 Unit(s) Oral daily  enoxaparin Injectable 40 milliGRAM(s) SubCutaneous every 24 hours  pantoprazole    Tablet 40 milliGRAM(s) Oral before breakfast  predniSONE   Tablet 60 milliGRAM(s) Oral every 24 hours    MEDICATIONS  (PRN):  acetaminophen     Tablet .. 650 milliGRAM(s) Oral every 6 hours PRN Mild Pain (1 - 3)    Vital Signs Last 24 Hrs  T(C): 36.7 (15 Cristobal 2022 09:28), Max: 36.8 (15 Cristobal 2022 06:08)  T(F): 98.1 (15 Cristobal 2022 09:28), Max: 98.3 (15 Cristobal 2022 06:08)  HR: 60 (15 Cristobal 2022 09:28) (60 - 70)  BP: 107/64 (15 Cristobal 2022 09:28) (103/54 - 107/64)  BP(mean): --  RR: 19 (15 Cristobal 2022 09:28) (17 - 19)  SpO2: 98% (15 Cristobal 2022 09:28) (97% - 98%)    06-14 @ 07:01  -  06-15 @ 07:00  --------------------------------------------------------  IN: 250 mL / OUT: 0 mL / NET: 250 mL      PHYSICAL EXAM:    General: in no acute distress  HEENT: MMM, conjunctiva and sclera clear  Gastrointestinal: Soft non-tender non-distended; No rebound or guarding  Skin: Warm and dry. No obvious rash    LABS:                        11.1   15.35 )-----------( 462      ( 15 Cristobal 2022 06:38 )             35.2     06-15    140  |  104  |  17  ----------------------------<  124<H>  4.3   |  28  |  0.66    Ca    9.1      15 Cristobal 2022 06:38  Phos  3.6     06-15  Mg     2.1     06-15    TPro  6.2  /  Alb  3.4  /  TBili  0.2  /  DBili  x   /  AST  14  /  ALT  21  /  AlkPhos  42  06-15                      RADIOLOGY & ADDITIONAL STUDIES:  Reviewed

## 2022-06-15 NOTE — PROGRESS NOTE ADULT - PROBLEM SELECTOR PLAN 2
Seen by neuro. less likely neuropathic  Plan:  - f/u MR pelvis, consider Ortho  - tylenol for mild pain for now  - PT consult after MRI
possibly reactive arthritis i/s/o UC flare vs less likely avascular necrosis due to recent hx of steroids. MR L spine neg    Plan:  - f/u MR R hip  - tylenol for mild pain for now  - PT consult after MRI
MR pelvis showing labrum tear. Pain improved with steroids. No c/f AVN    Plan:  - f/u PT  - tylenol for mild pain
Plan: MR pelvis showing labrum tear. Pain improved with steroids. No c/f AVN    Plan:  - outpatient PT  - tylenol for mild pain

## 2022-06-15 NOTE — DISCHARGE NOTE NURSING/CASE MANAGEMENT/SOCIAL WORK - PATIENT PORTAL LINK FT
You can access the FollowMyHealth Patient Portal offered by [facility] by registering at the following website: [email or web address]. By joining [facility]’s FollowMyHealth portal, you will also be able to view your health information using other applications (apps) compatible with our system.

## 2022-06-15 NOTE — PROGRESS NOTE ADULT - PROBLEM SELECTOR PLAN 1
Hx of chronic diarrhea from ulcerative colitis diagnosed via colonoscopy in 01/2022.   - Follows with Dr. Soriano as an outpatient.   - Has been on mesalamine in the past that has mildly relieved diarrhea but still has 8 BMs per day.   - One month ago, pt started to have issues with fecal urge incontinence for which GI started him on budesonide.  - GI consulted in ED - reccomending steroids for UC flare  - reportedly has been off and on antibiotics for concern for chronic lyme disease  - infectious w/u negative  Plan:  - c/w solumedrol 20 q8 per GI  - regular diet for now  - CRP with AM labs  - calcium and vitamin D while on steroids
Having less frequent BMs. No abdominal pain    Plan:  - c/w solumedrol 20 q8 per GI  - regular diet   - CRP with AM labs  - calcium and vitamin D while on steroids  - f/u GI reccs
·  Plan: Having less frequent BMs, no rectal bleeding. No abdominal pain    Plan:  - c/w solumedrol 20 q8 per GI   - per GI, transition to PO prednisone 60 qd, prednisone taper  - regular diet   - CRP with AM labs  - calcium and vitamin D while on steroids  - f/u GI reccs.
Having less frequent BMs. No abdominal pain    Plan:  - c/w solumedrol 20 q8 per GI  - Likely one more day on IV steroids- can transition to PO tomorrow per GI  - regular diet   - CRP with AM labs  - calcium and vitamin D while on steroids  - f/u GI reccs

## 2022-06-15 NOTE — PROGRESS NOTE ADULT - PROBLEM SELECTOR PLAN 3
Fe studies suggestive of BRIJESH    Plan:  - begin IV sucrose 300 qd X 3 doses, as infectious work up negative  - maintain active T+S  - monitor daily CBC
Fe studies suggestive of BRIJESH    Plan:  - c/w IV iron sucrose 300 qd X 3 (3rd dose today)  - maintain active T+S  - monitor daily CBC  - PO iron on DC
Fe studies suggestive of BRIJESH    Plan:  - c/w IV iron sucrose 300 qd X 2 doses  - maintain active T+S  - monitor daily CBC  - PO iron on DC
·  Plan: Fe studies suggestive of BRIJESH    Plan:  - c/w IV iron sucrose 300 qd X 3 (3rd dose today)  - maintain active T+S  - monitor daily CBC  - PO iron on DC.

## 2022-06-15 NOTE — PROGRESS NOTE ADULT - REASON FOR ADMISSION
UC flare and R hip pain

## 2022-06-17 LAB
GAMMA INTERFERON BACKGROUND BLD IA-ACNC: 0.01 IU/ML — SIGNIFICANT CHANGE UP
M TB IFN-G BLD-IMP: ABNORMAL
M TB IFN-G CD4+ BCKGRND COR BLD-ACNC: 0 IU/ML — SIGNIFICANT CHANGE UP
M TB IFN-G CD4+CD8+ BCKGRND COR BLD-ACNC: 0 IU/ML — SIGNIFICANT CHANGE UP
QUANT TB PLUS MITOGEN MINUS NIL: 0.02 IU/ML — SIGNIFICANT CHANGE UP

## 2022-06-21 DIAGNOSIS — S73.101A UNSPECIFIED SPRAIN OF RIGHT HIP, INITIAL ENCOUNTER: ICD-10-CM

## 2022-06-21 DIAGNOSIS — G61.81 CHRONIC INFLAMMATORY DEMYELINATING POLYNEURITIS: ICD-10-CM

## 2022-06-21 DIAGNOSIS — R63.6 UNDERWEIGHT: ICD-10-CM

## 2022-06-21 DIAGNOSIS — K51.90 ULCERATIVE COLITIS, UNSPECIFIED, WITHOUT COMPLICATIONS: ICD-10-CM
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
## 2022-06-21 DIAGNOSIS — D50.9 IRON DEFICIENCY ANEMIA, UNSPECIFIED: ICD-10-CM

## 2022-09-12 PROBLEM — F99 MENTAL DISORDER, NOT OTHERWISE SPECIFIED: Chronic | Status: ACTIVE | Noted: 2022-06-11

## 2022-09-12 PROBLEM — K51.90 ULCERATIVE COLITIS, UNSPECIFIED, WITHOUT COMPLICATIONS: Chronic | Status: ACTIVE | Noted: 2022-06-11

## 2022-09-19 ENCOUNTER — APPOINTMENT (OUTPATIENT)
Dept: GASTROENTEROLOGY | Facility: CLINIC | Age: 27
End: 2022-09-19

## 2022-09-19 VITALS
WEIGHT: 145 LBS | HEIGHT: 70 IN | DIASTOLIC BLOOD PRESSURE: 70 MMHG | BODY MASS INDEX: 20.76 KG/M2 | RESPIRATION RATE: 16 BRPM | HEART RATE: 95 BPM | OXYGEN SATURATION: 98 % | TEMPERATURE: 98.3 F | SYSTOLIC BLOOD PRESSURE: 118 MMHG

## 2022-09-19 PROCEDURE — 99205 OFFICE O/P NEW HI 60 MIN: CPT

## 2022-09-19 NOTE — PHYSICAL EXAM
[Alert] : alert [No Acute Distress] : no acute distress [Sclera] : the sclera and conjunctiva were normal [Hearing Threshold Finger Rub Not [place]] : hearing was normal [Oropharynx] : the oropharynx was normal [Normal Appearance] : the appearance of the neck was normal [No Acc Muscle Use] : no accessory muscle use [Auscultation Breath Sounds / Voice Sounds] : lungs were clear to auscultation bilaterally [Heart Rate And Rhythm] : heart rate was normal and rhythm regular [Normal S1, S2] : normal S1 and S2 [Bowel Sounds] : normal bowel sounds [Abdomen Tenderness] : non-tender [Abdomen Soft] : soft [Supraclavicular Lymph Nodes Enlarged Bilaterally] : no supraclavicular lymphadenopathy [Cervical Lymph Nodes Enlarged Anterior Bilaterally] : no anterior cervical lymphadenopathy [No CVA Tenderness] : no CVA  tenderness [No Spinal Tenderness] : no spinal tenderness [Abnormal Walk] : normal gait [] : no rash [No Focal Deficits] : no focal deficits [Oriented To Time, Place, And Person] : oriented to person, place, and time [Normal Affect] : the affect was normal [Normal Insight/Judgment] : insight and judgment were intact

## 2022-09-20 NOTE — ASSESSMENT
[FreeTextEntry1] : Pt is a 26 y/o male with tentative diagnosis of UC diagnosed 1/2022 presenting to establish care after insurance change, pt of Dr Soriano. Initial symptoms of urgency, rectal bleeding, and loose stool; colonoscopy 1/2022 with proctosigmoiditis and cecal erythema extending toward ascending colon and treated with PO budesonide and mesalamine suppository with control of symptoms. Pt had flare in 6/2022, was hospitalized at [facility], had CT with pancolitis, and treated with IV steroids with good response. He was discharged on PO prednisone and started on vedo in June. He is now s/p 3 loading doses and has tried tapering prednisone but was unable to taper below 20 mg without significant increase in BM frequency, urgency and bleeding. On 20mg he is still having some intermittent bleeding and occasional loose; reports feeling ~50% better than when he was flaring.  \par \par Here to establish care.  I did confirm with his current treating physician Dr. 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 Also noted to have torn R acetabular labrum on MRI 6/2022 (NO avascular necrosis) performed for R hip pain which has since resolved after steroids. Denies EIMs. Denies oral ulcers. Denies smoking, alcohol, marijuana, or illicit drug use. \par \par \par # Tentative diagnosis of UC, Pancolitis- not in clinical remission\par - initial colonoscopy with rectosigmoid disease and cecal erythema extending to ascending colon raises question of pancolitis vs possible CD; it is unclear to me but does not have the description of a cecal patch and I am unable to assess based on black-and-white photos.  Will order MRE to assess small bowel\par - remains steroid dependant and unclear if pts clinical symptoms and labs improving due to entyvio or steroids\par - due to slow onset of action on entyvio, would give a few more weeks to assess response; f/u levels prior to next infusion\par - c/w entyvio and PO prednisone for now, tapered by 5mg weekly if tolerated\par - if remains unable to taper over next vedo interval, consider sigmoidoscopy\par - counselled on Vit D+calcium while on steroids\par - pts confirms his psychiatrist aware of him using steroids and denies any significant mood changes on steroids\par \par \par ?hx of low IgG1\par -Interestingly this can be associated with recurrent C. difficile but he has never had this infection.  \par \par #?Bipolar\par -Under the care of a psychiatrist but not currently on any medications.\par We will plan to avoid TCA class of medications in him.\par -Pt demonstrates capacity in office today and would like to direct his own care; his mother accompanies him today but he prefers her not to be part of the office visit.\par \par f/u 4 weeks post vedo infusion\par \par \par Parrish Atkins DO\par [facility] \par

## 2022-09-20 NOTE — HISTORY OF PRESENT ILLNESS
[FreeTextEntry1] : Pt is a 26 y/o male with tentative diagnosis of UC diagnosed 1/2022 presenting to [facility] care after insurance change, pt of Dr Soriano. Initial symptoms of urgency, rectal bleeding, and loose stool; colonoscopy 1/2022 with proctosigmoiditis and cecal erythema extending toward ascending colon and treated with PO budesonide and mesalamine suppository with control of symptoms. Pt had flare in 6/2022, was hospitalized at [facility], had CT with pancolitis, and treated with IV steroids with good response. He was discharged on PO prednisone and started on vedo in June. He is now s/p 3 loading doses and has tried tapering prednisone but was unable to taper below 20 mg without significant increase in BM frequency, urgency and bleeding. On 20mg he is still having some intermittent bleeding and occasional loose; reports feeling ~50% better than when he was flaring. 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 Also noted to have torn R acetabular labrum on MRI 6/2022 performed for R hip pain which has since resolved after steroids.\par \par Denies EIMs.\par \par Mom has Hx of UC which is in remission; not currently being treated or hx of surgery.\par \par Denies smoking, alcohol, marijuana, or illicit drug use.\par

## 2022-09-20 NOTE — CONSULT LETTER
[Dear  ___] : Dear  [unfilled], [Consult Letter:] : I had the pleasure of evaluating your patient, [unfilled]. [Please see my note below.] : Please see my note below. [Consult Closing:] : Thank you very much for allowing me to participate in the care of this patient.  If you have any questions, please do not hesitate to contact me. [Sincerely,] : Sincerely, [FreeTextEntry3] : Alexey Cai MD\par Associate Professor of Medicine\par Chief of GI\par Director IBD Program\par [facility]\par

## 2022-09-21 ENCOUNTER — NON-APPOINTMENT (OUTPATIENT)
Age: 27
End: 2022-09-21

## 2022-09-21 ENCOUNTER — TRANSCRIPTION ENCOUNTER (OUTPATIENT)
Age: 27
End: 2022-09-21

## 2022-09-26 ENCOUNTER — TRANSCRIPTION ENCOUNTER (OUTPATIENT)
Age: 27
End: 2022-09-26

## 2022-09-27 ENCOUNTER — TRANSCRIPTION ENCOUNTER (OUTPATIENT)
Age: 27
End: 2022-09-27

## 2022-10-01 ENCOUNTER — RESULT REVIEW (OUTPATIENT)
Age: 27
End: 2022-10-01

## 2022-10-01 ENCOUNTER — APPOINTMENT (OUTPATIENT)
Dept: MRI IMAGING | Facility: HOSPITAL | Age: 27
End: 2022-10-01

## 2022-10-01 ENCOUNTER — OUTPATIENT (OUTPATIENT)
Dept: OUTPATIENT SERVICES | Facility: HOSPITAL | Age: 27
LOS: 1 days | End: 2022-10-01
Payer: MEDICAID

## 2022-10-01 PROCEDURE — 74183 MRI ABD W/O CNTR FLWD CNTR: CPT | Mod: 26

## 2022-10-01 PROCEDURE — 72197 MRI PELVIS W/O & W/DYE: CPT | Mod: 26

## 2022-10-01 PROCEDURE — 74183 MRI ABD W/O CNTR FLWD CNTR: CPT

## 2022-10-01 PROCEDURE — A9585: CPT

## 2022-10-01 PROCEDURE — 72197 MRI PELVIS W/O & W/DYE: CPT

## 2022-10-03 RX ORDER — VEDOLIZUMAB 108 MG/.68ML
300 INJECTION, SOLUTION SUBCUTANEOUS ONCE
Refills: 0 | Status: COMPLETED | OUTPATIENT
Start: 2022-12-01 | End: 2022-12-01

## 2022-10-06 ENCOUNTER — APPOINTMENT (OUTPATIENT)
Dept: INFUSION THERAPY | Facility: CLINIC | Age: 27
End: 2022-10-06

## 2022-10-06 ENCOUNTER — NON-APPOINTMENT (OUTPATIENT)
Age: 27
End: 2022-10-06

## 2022-10-06 ENCOUNTER — OUTPATIENT (OUTPATIENT)
Dept: OUTPATIENT SERVICES | Facility: HOSPITAL | Age: 27
LOS: 1 days | End: 2022-10-06
Payer: MEDICAID

## 2022-10-06 VITALS
SYSTOLIC BLOOD PRESSURE: 112 MMHG | TEMPERATURE: 98 F | OXYGEN SATURATION: 99 % | RESPIRATION RATE: 18 BRPM | DIASTOLIC BLOOD PRESSURE: 74 MMHG | HEART RATE: 78 BPM

## 2022-10-06 DIAGNOSIS — K51.90 ULCERATIVE COLITIS, UNSPECIFIED, WITHOUT COMPLICATIONS: ICD-10-CM

## 2022-10-06 LAB
ALBUMIN SERPL ELPH-MCNC: 4.3 G/DL — SIGNIFICANT CHANGE UP (ref 3.3–5)
ALP SERPL-CCNC: 47 U/L — SIGNIFICANT CHANGE UP (ref 40–120)
ALT FLD-CCNC: 22 U/L — SIGNIFICANT CHANGE UP (ref 10–45)
ANION GAP SERPL CALC-SCNC: 12 MMOL/L — SIGNIFICANT CHANGE UP (ref 5–17)
AST SERPL-CCNC: 14 U/L — SIGNIFICANT CHANGE UP (ref 10–40)
BILIRUB SERPL-MCNC: 0.3 MG/DL — SIGNIFICANT CHANGE UP (ref 0.2–1.2)
BUN SERPL-MCNC: 10 MG/DL — SIGNIFICANT CHANGE UP (ref 7–23)
CALCIUM SERPL-MCNC: 9.4 MG/DL — SIGNIFICANT CHANGE UP (ref 8.4–10.5)
CHLORIDE SERPL-SCNC: 101 MMOL/L — SIGNIFICANT CHANGE UP (ref 96–108)
CO2 SERPL-SCNC: 30 MMOL/L — SIGNIFICANT CHANGE UP (ref 22–31)
CREAT SERPL-MCNC: 0.9 MG/DL — SIGNIFICANT CHANGE UP (ref 0.5–1.3)
CRP SERPL-MCNC: <3 MG/L — SIGNIFICANT CHANGE UP (ref 0–4)
EGFR: 121 ML/MIN/1.73M2 — SIGNIFICANT CHANGE UP
GLUCOSE SERPL-MCNC: 79 MG/DL — SIGNIFICANT CHANGE UP (ref 70–99)
HCT VFR BLD CALC: 44.5 % — SIGNIFICANT CHANGE UP (ref 39–50)
HGB BLD-MCNC: 13.6 G/DL — SIGNIFICANT CHANGE UP (ref 13–17)
MCHC RBC-ENTMCNC: 24.5 PG — LOW (ref 27–34)
MCHC RBC-ENTMCNC: 30.6 GM/DL — LOW (ref 32–36)
MCV RBC AUTO: 80 FL — SIGNIFICANT CHANGE UP (ref 80–100)
NRBC # BLD: 0 /100 WBCS — SIGNIFICANT CHANGE UP (ref 0–0)
PLATELET # BLD AUTO: 322 K/UL — SIGNIFICANT CHANGE UP (ref 150–400)
POTASSIUM SERPL-MCNC: 3.4 MMOL/L — LOW (ref 3.5–5.3)
POTASSIUM SERPL-SCNC: 3.4 MMOL/L — LOW (ref 3.5–5.3)
PROT SERPL-MCNC: 7 G/DL — SIGNIFICANT CHANGE UP (ref 6–8.3)
RBC # BLD: 5.56 M/UL — SIGNIFICANT CHANGE UP (ref 4.2–5.8)
RBC # FLD: 14 % — SIGNIFICANT CHANGE UP (ref 10.3–14.5)
SODIUM SERPL-SCNC: 143 MMOL/L — SIGNIFICANT CHANGE UP (ref 135–145)
WBC # BLD: 11.05 K/UL — HIGH (ref 3.8–10.5)
WBC # FLD AUTO: 11.05 K/UL — HIGH (ref 3.8–10.5)

## 2022-10-06 PROCEDURE — 36415 COLL VENOUS BLD VENIPUNCTURE: CPT

## 2022-10-06 PROCEDURE — 80053 COMPREHEN METABOLIC PANEL: CPT

## 2022-10-06 PROCEDURE — 82542 COL CHROMOTOGRAPHY QUAL/QUAN: CPT

## 2022-10-06 PROCEDURE — 80280 DRUG ASSAY VEDOLIZUMAB: CPT

## 2022-10-06 PROCEDURE — 85027 COMPLETE CBC AUTOMATED: CPT

## 2022-10-06 PROCEDURE — 86140 C-REACTIVE PROTEIN: CPT

## 2022-10-06 PROCEDURE — 96413 CHEMO IV INFUSION 1 HR: CPT

## 2022-10-06 RX ORDER — VEDOLIZUMAB 108 MG/.68ML
300 INJECTION, SOLUTION SUBCUTANEOUS ONCE
Refills: 0 | Status: COMPLETED | OUTPATIENT
Start: 2022-10-06 | End: 2022-10-06

## 2022-10-06 RX ADMIN — VEDOLIZUMAB 500 MILLIGRAM(S): 108 INJECTION, SOLUTION SUBCUTANEOUS at 12:50

## 2022-10-06 RX ADMIN — VEDOLIZUMAB 300 MILLIGRAM(S): 108 INJECTION, SOLUTION SUBCUTANEOUS at 13:20

## 2022-10-11 LAB
ANTIBODIES TO VEDOLIZUMAB (ATV) CONCENTRATION: < 1.6 U/ML — SIGNIFICANT CHANGE UP
PROMETHEUS ANSER VEDOLIZUMAB RESULT: SIGNIFICANT CHANGE UP
PROMETHEUS LABORATORY FOOTER: SIGNIFICANT CHANGE UP
SERUM VEDOLIZUMAB (VDZ) CONCENTRATION: 6 UG/ML — SIGNIFICANT CHANGE UP

## 2022-10-21 ENCOUNTER — APPOINTMENT (OUTPATIENT)
Dept: GASTROENTEROLOGY | Facility: CLINIC | Age: 27
End: 2022-10-21

## 2022-10-21 PROCEDURE — 99214 OFFICE O/P EST MOD 30 MIN: CPT | Mod: 95

## 2022-10-21 NOTE — PHYSICAL EXAM
[Alert] : alert [No Acute Distress] : no acute distress [Sclera] : the sclera and conjunctiva were normal [Hearing Threshold Finger Rub Not [place]] : hearing was normal [Oropharynx] : the oropharynx was normal [Normal Appearance] : the appearance of the neck was normal [No Acc Muscle Use] : no accessory muscle use [Auscultation Breath Sounds / Voice Sounds] : lungs were clear to auscultation bilaterally [Heart Rate And Rhythm] : heart rate was normal and rhythm regular [Normal S1, S2] : normal S1 and S2 [Abdomen Tenderness] : non-tender [Bowel Sounds] : normal bowel sounds [Abdomen Soft] : soft [Supraclavicular Lymph Nodes Enlarged Bilaterally] : no supraclavicular lymphadenopathy [Cervical Lymph Nodes Enlarged Anterior Bilaterally] : no anterior cervical lymphadenopathy [No CVA Tenderness] : no CVA  tenderness [No Spinal Tenderness] : no spinal tenderness [Abnormal Walk] : normal gait [] : no rash [No Focal Deficits] : no focal deficits [Oriented To Time, Place, And Person] : oriented to person, place, and time [Normal Affect] : the affect was normal [Normal Insight/Judgment] : insight and judgment were intact

## 2022-10-26 NOTE — HISTORY OF PRESENT ILLNESS
[Home] : at home, [unfilled] , at the time of the visit. [Medical Office: ([facility])___] : at the medical office located in  [Verbal consent obtained from patient] : the patient, [unfilled] [FreeTextEntry1] : Pt is a 28 y/o male with tentative diagnosis of UC diagnosed 1/2022 presenting to [facility] care after insurance change, pt of Dr Soriano. Initial symptoms of urgency, rectal bleeding, and loose stool; colonoscopy 1/2022 with proctosigmoiditis and cecal erythema extending toward ascending colon and treated with PO budesonide and mesalamine suppository with control of symptoms. Pt had flare in 6/2022, was hospitalized at [facility], had CT with pancolitis, and treated with IV steroids with good response. He was discharged on PO prednisone and started on vedo in June. He is now s/p 1 maintenance dose of Vedo and has tapered to 15 mg with no recurrence of rectal bleeding but noticing incontinence of loose stools.  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 Also noted to have torn R acetabular labrum on MRI 6/2022 performed for R hip pain which has since resolved after steroids. Telemed today with his mom - pt unable to put video on. \par \par \par Having slightly more BMs (5x) than his baseline but no change since 20mg. Still having urgency on occasion with fecal incontinence with diarrhea - occurred 7x in the last 2 weeks. No blood in his stools. One nocturnal BM at night on occasion (3-4x/night). Consistency is semi-formed. \par \par Mom has Hx of UC which is in remission; not currently being treated or hx of surgery.\par \par Denies smoking, alcohol, marijuana, or illicit drug use.\par

## 2022-10-26 NOTE — ASSESSMENT
[FreeTextEntry1] : Pt is a 28 y/o male with tentative diagnosis of UC diagnosed 1/2022 [facility], pt of Dr Soriano. Initial symptoms of urgency, rectal bleeding, and loose stool; colonoscopy 1/2022 with proctosigmoiditis and cecal erythema extending toward ascending colon and treated with PO budesonide and mesalamine suppository with control of symptoms. Pt had flare in 6/2022, was hospitalized at [facility], had CT with pancolitis, and treated with IV steroids with good response. He was discharged on PO prednisone and started on vedo in June. He is now s/p 1st maintenance dose of Entyvio and has tried tapering prednisone but was unable to taper below 20 mg without significant increase in BM frequency, urgency and bleeding. Now on 15mg and he reports resolution of rectal bleeding, but still having loose stools with urgency and near incontinence. 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 Also noted to have torn R acetabular labrum on MRI 6/2022 (NO avascular necrosis) performed for R hip pain which has since resolved after steroids. Denies EIMs. Denies oral ulcers. Denies smoking, alcohol, marijuana, or illicit drug use. \par \par # Tentative diagnosis of UC, Pancolitis- not in clinical remission\par - initial colonoscopy with rectosigmoid disease and cecal erythema extending to ascending colon raises question of pancolitis vs possible CD; it is unclear to me but does not have the description of a cecal patch and I am unable to assess based on black-and-white photos.  MRE reported mild sigmoid colitis and questionable terminal ileitis which could represent backwash ileitis\par - remains steroid dependant and unclear if pts clinical symptoms and labs improving due to entyvio or steroids\par - due to slow onset of action on entyvio, would give a few more weeks to assess response; f/u levels prior to next infusion \par - c/w entyvio and PO prednisone for now, tapered by 5mg weekly if tolerates; pt's mom is very anxious to stop prednisone as she reports neuro/psych meds cannot be started until prednisone off; I encouraged them to discuss taper with Dr. Conde whom I also called but have not connected with yet \par - plan for cscope this December to assess response after 2 maintenance doses of Vedo\par - counselled on Vit D+calcium while on steroids\par - pts confirms his psychiatrist aware of him using steroids and denies any significant mood changes on steroids\par - add fiber to bulk up stools which may help with incontinence - unclear if incontinence from active inflammation which appears to all be improving or another factor? advised to discuss with neuro as well\par \par ?hx of low IgG1\par -Interestingly this can be associated with recurrent C. difficile but he has never had this infection.  \par \par #?Bipolar\par -Under the care of a psychiatrist but not currently on any medications.\par We will plan to avoid TCA class of medications in him.\par -Pt demonstrates capacity but has given permission to involve mom in decisionmaking\par - mother expresses concern over his rage and mood swings; the patient has been connected with [facility] who he declined services from; I strongly encouraged the mother to seek help from law enforcement if she is concerned about any safety issues but her and the son decline at this time \par \par F/U 3 weeks

## 2022-10-26 NOTE — CONSULT LETTER
[Dear  ___] : Dear  [unfilled], [Consult Letter:] : I had the pleasure of evaluating your patient, [unfilled]. [Please see my note below.] : Please see my note below. [Consult Closing:] : Thank you very much for allowing me to participate in the care of this patient.  If you have any questions, please do not hesitate to contact me. [Sincerely,] : Sincerely, [FreeTextEntry3] : Ora Stratton NP

## 2022-11-08 ENCOUNTER — APPOINTMENT (OUTPATIENT)
Dept: GASTROENTEROLOGY | Facility: CLINIC | Age: 27
End: 2022-11-08

## 2022-11-08 PROCEDURE — 99214 OFFICE O/P EST MOD 30 MIN: CPT | Mod: 95

## 2022-11-08 NOTE — PHYSICAL EXAM
[Alert] : alert [No Acute Distress] : no acute distress [Sclera] : the sclera and conjunctiva were normal [Hearing Threshold Finger Rub Not [place]] : hearing was normal [Oropharynx] : the oropharynx was normal [Normal Appearance] : the appearance of the neck was normal [No Acc Muscle Use] : no accessory muscle use [Auscultation Breath Sounds / Voice Sounds] : lungs were clear to auscultation bilaterally [Heart Rate And Rhythm] : heart rate was normal and rhythm regular [Normal S1, S2] : normal S1 and S2 [Bowel Sounds] : normal bowel sounds [Abdomen Tenderness] : non-tender [Abdomen Soft] : soft [Supraclavicular Lymph Nodes Enlarged Bilaterally] : no supraclavicular lymphadenopathy [Cervical Lymph Nodes Enlarged Anterior Bilaterally] : no anterior cervical lymphadenopathy [No CVA Tenderness] : no CVA  tenderness [No Spinal Tenderness] : no spinal tenderness [Abnormal Walk] : normal gait [] : no rash [No Focal Deficits] : no focal deficits [Oriented To Time, Place, And Person] : oriented to person, place, and time [Normal Affect] : the affect was normal [Normal Insight/Judgment] : insight and judgment were intact

## 2022-11-10 DIAGNOSIS — F31.9 BIPOLAR DISORDER, UNSPECIFIED: ICD-10-CM

## 2022-11-11 NOTE — ASSESSMENT
[FreeTextEntry1] : Pt is a 28 y/o male with tentative diagnosis of UC diagnosed 1/2022 presenting to [facility] care after insurance change, pt of Dr Soriano. Initial symptoms of urgency, rectal bleeding, and loose stool; colonoscopy 1/2022 with proctosigmoiditis and cecal erythema extending toward ascending colon and treated with PO budesonide and mesalamine suppository with control of symptoms. Pt had flare in 6/2022, was hospitalized at [facility], had CT with pancolitis, and treated with IV steroids with good response. He was discharged on PO prednisone and started on vedo in June. He is now s/p 1st maintenance dose of Entyvio and has tried tapering prednisone but was unable to taper below 20 mg without significant increase in BM frequency, urgency and bleeding. Now on 10mg prednisone and he reports return of some GI symptoms like rectal bleeding, urgency and near incontinence.\par \par Labs improved from flare: calpro 678-->99, Hb 11.1-->14 and resolution of blood as well as improvement of colitis on MRE on 15mg prednisone. Mom was adament about patient tapering down off steroids - r/b discussed in detail. Given some worsening on 10mg, advised pt not contnue to taper and to inc back up to 15mg if symptoms become intolerable or worsen. 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 Also noted to have torn R acetabular labrum on MRI 6/2022 (NO avascular necrosis) performed for R hip pain which has since resolved after steroids. Denies EIMs. Denies oral ulcers. Denies smoking, alcohol, marijuana, or illicit drug use. I had attempted to reach out to his Neurologist Dr. Conde but their office did not return my call and the patient's mom informed me the MD will not speak to me without the patient paying them a  fee. \par \par # Tentative diagnosis of UC, Pancolitis- not in clinical remission\par - initial colonoscopy with rectosigmoid disease and cecal erythema extending to ascending colon raises question of pancolitis vs possible CD; it is unclear to me but does not have the description of a cecal patch and I am unable to assess based on black-and-white photos.  MRE reported mild sigmoid colitis and questionable terminal ileitis which could represent backwash ileitis\par - remains steroid dependant and unclear if pts clinical symptoms and labs improving due to entyvio or steroids\par - due to slow onset of action on entyvio, would give a few more weeks to assess response; f/u levels prior to next infusion \par - c/w entyvio and PO prednisone for now \par - plan for cscope this December to assess response after 2 maintenance doses of Vedo - consider escalated dosing pending cscope\par - counselled on Vit D+calcium while on steroids\par - pts confirms his psychiatrist aware of him using steroids and denies any significant mood changes on steroids\par - add fiber to bulk up stools which may help with incontinence - unclear if incontinence from active inflammation which appears to all be improving or another factor? advised to discuss with neuro as well\par \par ?hx of low IgG1\par -Interestingly this can be associated with recurrent C. difficile but he has never had this infection.  \par \par #?Bipolar\par - Under the care of a psychiatrist but not currently on any medications.\par We will plan to avoid TCA class of medications in him.\par - Pt demonstrates capacity but has given permission to involve mom in decisionmaking\par - mother expresses concern over his rage and mood swings; the patient has been connected with [employer] who he declined services from; I strongly encouraged the mother to seek help from law enforcement if she is concerned about any safety issues but her and the son decline at this time \par \par F/U post-CSCOPE

## 2022-11-11 NOTE — HISTORY OF PRESENT ILLNESS
[Home] : at home, [unfilled] , at the time of the visit. [Medical Office: ([facility])___] : at the medical office located in  [Verbal consent obtained from patient] : the patient, [unfilled] [FreeTextEntry1] : Pt is a 28 y/o male with tentative diagnosis of UC diagnosed 1/2022 presenting to [facility] care after insurance change, pt of Dr Soriano. Initial symptoms of urgency, rectal bleeding, and loose stool; colonoscopy 1/2022 with proctosigmoiditis and cecal erythema extending toward ascending colon and treated with PO budesonide and mesalamine suppository with control of symptoms. Pt had flare in 6/2022, was hospitalized at [facility], had CT with pancolitis, and treated with IV steroids with good response. He was discharged on PO prednisone and started on vedo in June. He is now s/p 1 maintenance dose of Vedo and has tapered to 15 mg with no recurrence of rectal bleeding but noticing incontinence of loose stools.  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 Also noted to have torn R acetabular labrum on MRI 6/2022 performed for R hip pain which has since resolved after steroids. Telemed today with his mom - pt began steroid taper with slight worsening of symptoms. \par \par Since going to 10mg prednisone he's noticed inc in rectal bleeding with each BM - mostly when wiping. + urgency, has not started fiber. Having 7-8 BMs per day. Good appetite. Still having nocturnal stools. No weight loss. No n/v. \par \par Mom has Hx of UC which is in remission; not currently being treated or hx of surgery.\par \par Denies smoking, alcohol, marijuana, or illicit drug use.\par

## 2022-11-30 ENCOUNTER — NON-APPOINTMENT (OUTPATIENT)
Age: 27
End: 2022-11-30

## 2022-12-01 ENCOUNTER — APPOINTMENT (OUTPATIENT)
Dept: INFUSION THERAPY | Facility: CLINIC | Age: 27
End: 2022-12-01

## 2022-12-01 ENCOUNTER — OUTPATIENT (OUTPATIENT)
Dept: OUTPATIENT SERVICES | Facility: HOSPITAL | Age: 27
LOS: 1 days | End: 2022-12-01
Payer: MEDICAID

## 2022-12-01 VITALS
HEIGHT: 71 IN | WEIGHT: 149.91 LBS | DIASTOLIC BLOOD PRESSURE: 65 MMHG | OXYGEN SATURATION: 98 % | TEMPERATURE: 98 F | HEART RATE: 89 BPM | RESPIRATION RATE: 18 BRPM | SYSTOLIC BLOOD PRESSURE: 100 MMHG

## 2022-12-01 DIAGNOSIS — K51.90 ULCERATIVE COLITIS, UNSPECIFIED, WITHOUT COMPLICATIONS: ICD-10-CM

## 2022-12-01 LAB
ALBUMIN SERPL ELPH-MCNC: 4.2 G/DL — SIGNIFICANT CHANGE UP (ref 3.3–5)
ALP SERPL-CCNC: 52 U/L — SIGNIFICANT CHANGE UP (ref 40–120)
ALT FLD-CCNC: 21 U/L — SIGNIFICANT CHANGE UP (ref 10–45)
ANION GAP SERPL CALC-SCNC: 7 MMOL/L — SIGNIFICANT CHANGE UP (ref 5–17)
AST SERPL-CCNC: 15 U/L — SIGNIFICANT CHANGE UP (ref 10–40)
BILIRUB SERPL-MCNC: 0.5 MG/DL — SIGNIFICANT CHANGE UP (ref 0.2–1.2)
BUN SERPL-MCNC: 12 MG/DL — SIGNIFICANT CHANGE UP (ref 7–23)
CALCIUM SERPL-MCNC: 9.4 MG/DL — SIGNIFICANT CHANGE UP (ref 8.4–10.5)
CHLORIDE SERPL-SCNC: 99 MMOL/L — SIGNIFICANT CHANGE UP (ref 96–108)
CO2 SERPL-SCNC: 31 MMOL/L — SIGNIFICANT CHANGE UP (ref 22–31)
CREAT SERPL-MCNC: 1 MG/DL — SIGNIFICANT CHANGE UP (ref 0.5–1.3)
CRP SERPL-MCNC: 10.1 MG/L — HIGH (ref 0–4)
EGFR: 106 ML/MIN/1.73M2 — SIGNIFICANT CHANGE UP
GLUCOSE SERPL-MCNC: 83 MG/DL — SIGNIFICANT CHANGE UP (ref 70–99)
HCT VFR BLD CALC: 45.2 % — SIGNIFICANT CHANGE UP (ref 39–50)
HGB BLD-MCNC: 13.8 G/DL — SIGNIFICANT CHANGE UP (ref 13–17)
MCHC RBC-ENTMCNC: 24.6 PG — LOW (ref 27–34)
MCHC RBC-ENTMCNC: 30.5 GM/DL — LOW (ref 32–36)
MCV RBC AUTO: 80.7 FL — SIGNIFICANT CHANGE UP (ref 80–100)
NRBC # BLD: 0 /100 WBCS — SIGNIFICANT CHANGE UP (ref 0–0)
PLATELET # BLD AUTO: 314 K/UL — SIGNIFICANT CHANGE UP (ref 150–400)
POTASSIUM SERPL-MCNC: 3.8 MMOL/L — SIGNIFICANT CHANGE UP (ref 3.5–5.3)
POTASSIUM SERPL-SCNC: 3.8 MMOL/L — SIGNIFICANT CHANGE UP (ref 3.5–5.3)
PROT SERPL-MCNC: 7.6 G/DL — SIGNIFICANT CHANGE UP (ref 6–8.3)
RBC # BLD: 5.6 M/UL — SIGNIFICANT CHANGE UP (ref 4.2–5.8)
RBC # FLD: 12.7 % — SIGNIFICANT CHANGE UP (ref 10.3–14.5)
SODIUM SERPL-SCNC: 137 MMOL/L — SIGNIFICANT CHANGE UP (ref 135–145)
WBC # BLD: 9.15 K/UL — SIGNIFICANT CHANGE UP (ref 3.8–10.5)
WBC # FLD AUTO: 9.15 K/UL — SIGNIFICANT CHANGE UP (ref 3.8–10.5)

## 2022-12-01 PROCEDURE — 86140 C-REACTIVE PROTEIN: CPT

## 2022-12-01 PROCEDURE — 82542 COL CHROMOTOGRAPHY QUAL/QUAN: CPT

## 2022-12-01 PROCEDURE — 80053 COMPREHEN METABOLIC PANEL: CPT

## 2022-12-01 PROCEDURE — 80280 DRUG ASSAY VEDOLIZUMAB: CPT

## 2022-12-01 PROCEDURE — 96413 CHEMO IV INFUSION 1 HR: CPT

## 2022-12-01 PROCEDURE — 85027 COMPLETE CBC AUTOMATED: CPT

## 2022-12-01 PROCEDURE — 36415 COLL VENOUS BLD VENIPUNCTURE: CPT

## 2022-12-01 RX ADMIN — VEDOLIZUMAB 300 MILLIGRAM(S): 108 INJECTION, SOLUTION SUBCUTANEOUS at 13:50

## 2022-12-01 RX ADMIN — VEDOLIZUMAB 500 MILLIGRAM(S): 108 INJECTION, SOLUTION SUBCUTANEOUS at 13:20

## 2022-12-03 ENCOUNTER — LABORATORY RESULT (OUTPATIENT)
Age: 27
End: 2022-12-03

## 2022-12-06 ENCOUNTER — RESULT REVIEW (OUTPATIENT)
Age: 27
End: 2022-12-06

## 2022-12-06 ENCOUNTER — APPOINTMENT (OUTPATIENT)
Dept: GASTROENTEROLOGY | Facility: HOSPITAL | Age: 27
End: 2022-12-06

## 2022-12-06 ENCOUNTER — OUTPATIENT (OUTPATIENT)
Dept: OUTPATIENT SERVICES | Facility: HOSPITAL | Age: 27
LOS: 1 days | Discharge: HOME | End: 2022-12-06
Payer: MEDICAID

## 2022-12-06 ENCOUNTER — TRANSCRIPTION ENCOUNTER (OUTPATIENT)
Age: 27
End: 2022-12-06

## 2022-12-06 LAB
ANTIBODIES TO VEDOLIZUMAB (ATV) CONCENTRATION: < 1.6 U/ML — SIGNIFICANT CHANGE UP
PROMETHEUS ANSER VEDOLIZUMAB RESULT: SIGNIFICANT CHANGE UP
PROMETHEUS LABORATORY FOOTER: SIGNIFICANT CHANGE UP
SERUM VEDOLIZUMAB (VDZ) CONCENTRATION: 3.3 UG/ML — SIGNIFICANT CHANGE UP

## 2022-12-06 PROCEDURE — 45378 DIAGNOSTIC COLONOSCOPY: CPT

## 2022-12-06 PROCEDURE — 88305 TISSUE EXAM BY PATHOLOGIST: CPT

## 2022-12-06 PROCEDURE — 88305 TISSUE EXAM BY PATHOLOGIST: CPT | Mod: 26

## 2022-12-06 NOTE — PRE-ANESTHESIA EVALUATION ADULT - ANESTHESIA, PREVIOUS REACTION, PROFILE
Airway  Performed by: Manuel Ferris MD  Authorized by: Manuel Ferris MD     Final Airway Type:  Endotracheal airway  Final Endotracheal Airway*:  ETT  ETT Size (mm)*:  3.5  Cuff*:  Micro  Technique Used for Successful ETT Placement:  Direct laryngoscopy  Devices/Methods Used in Placement*:  Oral ETT  Intubation Procedure*:  Preoxygenation, ETCO2, Atraumatic, Dentition Unchanged and Pharynx Clear  Insertion Site:  Oral  Blade Type*:  [place]  Blade Size*:  1  Measured from*:  Lips  Secured at (cm)*:  8  Placement Verified by: auscultation and capnometry    Glottic View*:  1 - full view of glottis  Attempts*:  1  Location:  OR  Urgency:  Elective  Difficult Airway: No    Indications for Airway Management:  Anesthesia  Sedation Level:  Anesthetized  Mask Difficulty Assessment:  1 - vent by mask  Performed By:  Anesthesiologist  Anesthesiologist:  Manuel Ferris MD  Start Time: 2021 8:04 AM        
none

## 2022-12-14 ENCOUNTER — APPOINTMENT (OUTPATIENT)
Dept: GASTROENTEROLOGY | Facility: CLINIC | Age: 27
End: 2022-12-14

## 2022-12-14 VITALS
DIASTOLIC BLOOD PRESSURE: 74 MMHG | TEMPERATURE: 96 F | SYSTOLIC BLOOD PRESSURE: 106 MMHG | OXYGEN SATURATION: 98 % | RESPIRATION RATE: 16 BRPM | WEIGHT: 141 LBS | HEART RATE: 91 BPM | BODY MASS INDEX: 20.19 KG/M2 | HEIGHT: 70 IN

## 2022-12-14 DIAGNOSIS — K52.9 NONINFECTIVE GASTROENTERITIS AND COLITIS, UNSPECIFIED: ICD-10-CM

## 2022-12-14 LAB — SURGICAL PATHOLOGY STUDY: SIGNIFICANT CHANGE UP

## 2022-12-14 PROCEDURE — 99214 OFFICE O/P EST MOD 30 MIN: CPT

## 2022-12-19 LAB
M TB IFN-G BLD-IMP: NEGATIVE
QUANTIFERON TB PLUS MITOGEN MINUS NIL: 3.17 IU/ML
QUANTIFERON TB PLUS NIL: 0.02 IU/ML
QUANTIFERON TB PLUS TB1 MINUS NIL: 0 IU/ML
QUANTIFERON TB PLUS TB2 MINUS NIL: 0 IU/ML

## 2022-12-23 PROBLEM — K52.9 INFLAMMATORY BOWEL DISEASE: Noted: 2022-09-20

## 2022-12-23 NOTE — CONSULT LETTER
[Dear  ___] : Dear  [unfilled], [Courtesy Letter:] : I had the pleasure of seeing your patient, [unfilled], in my office today. [Sincerely,] : Sincerely, [FreeTextEntry3] : Alexey Cai MD\par Professor of Medicine\par Chief of GI\par Director IBD Program\par [facility]\par

## 2022-12-23 NOTE — ASSESSMENT
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 Pathology neg for dysplasia. Pt currently still on 15mg prednisone with continued >10 loose BM daily with urgency, abdominal pain, and intermittent bleeding. Pt accompanied by Mom today and took part in interview as per pt request. \par \par # Ileocolonic CD; in flare\par - given that he has significant symptoms despite being on steroids and colonoscopy as above; discussed switching to IFX therapy and pt and mom are both agreeable\par - We detailed increased risk of infections (bacterial, fungal, viral) which we will mitigate by immunizing in appropriate fashion (HBV,zoster, flu, pneumovax); risk of malignancy with time spent on lymphoma (HSTCL in young adults) and skin cancers (need for derm annual eval) ; risk of liver injury, bone marrow suppression, CNS disorders, allergic and infusion (if IV admin) reactions, idiosyncratic skin reactions, joint problems, among other rare and unusual manifestations. We discussed the need to notify if fevers or major illness prior to infusions, and the need to maintain follow up and obtain request labs and evaluations. In addition, we have already discusses resources such as [facility] and the manufacturers "patients" page to obtain additional detailed information on the medication. \par - repeat quant today (previous indeterminate with cxr wnl 6/2022)\par - defer to pharmacy or PMD for Hep B booster\par - counselled on Vit D+calcium while on steroids\par - dexa after off steroids\par - pt confirms his psychiatrist aware of him using steroids and denies any significant mood changes on steroids\par - plan to be shared with pts PMD, Dr Toña Luz\par \par ?hx of low IgG1 vs lyme\par - following at [facility] infectious disease and planning for further w/u once off steroids\par \par #?Bipolar\par - Under the care of a psychiatrist but not currently on any medications.\par - We will plan to avoid TCA class of medications in him.\par - Pt demonstrates capacity but has given permission to involve mom in decision making\par \par f/u 4 weeks post IFX infusion\par \par \par Parrish Atkins DO\par [facility] IBD Fellow \par \par

## 2022-12-23 NOTE — HISTORY OF PRESENT ILLNESS
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 Pathology neg for dysplasia. Pt currently still on 15mg prednisone with continued >10 loose BM daily with urgency, abdominal pain, and intermittent bleeding. Pt accompanied by Mom today and took part in interview as per pt request. \par \par Prior HPI:\par \par Pt is a 26 y/o male with tentative diagnosis of UC diagnosed 1/2022 presenting to [facility] care after insurance change, pt of Dr Soriano. Initial symptoms of urgency, rectal bleeding, and loose stool; colonoscopy 1/2022 with proctosigmoiditis and cecal erythema extending toward ascending colon and treated with PO budesonide and mesalamine suppository with control of symptoms. Pt had flare in 6/2022, was hospitalized at [facility], had CT with pancolitis, and treated with IV steroids with good response. He was discharged on PO prednisone and started on vedo in June. He is now s/p 1 maintenance dose of Vedo and has tapered to 15 mg with no recurrence of rectal bleeding but noticing incontinence of loose stools. 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 Also noted to have torn R acetabular labrum on MRI 6/2022 performed for R hip pain which has since resolved after steroids. Telemed today with his mom - pt began steroid taper with slight worsening of symptoms. \par \par Since going to 10mg prednisone he's noticed inc in rectal bleeding with each BM - mostly when wiping. + urgency, has not started fiber. Having 7-8 BMs per day. Good appetite. Still having nocturnal stools. No weight loss. No n/v. \par \par Mom has Hx of UC which is in remission; not currently being treated or hx of surgery.\par \par Denies smoking, alcohol, marijuana, or illicit drug use.
stage II

## 2022-12-23 NOTE — PHYSICAL EXAM
[Alert] : alert [No Acute Distress] : no acute distress [Sclera] : the sclera and conjunctiva were normal [Hearing Threshold Finger Rub Not [place]] : hearing was normal [Normal Appearance] : the appearance of the neck was normal [No Acc Muscle Use] : no accessory muscle use [Auscultation Breath Sounds / Voice Sounds] : lungs were clear to auscultation bilaterally [Heart Rate And Rhythm] : heart rate was normal and rhythm regular [Normal S1, S2] : normal S1 and S2 [Bowel Sounds] : normal bowel sounds [No Masses] : no abdominal mass palpated [Abdomen Soft] : soft [Supraclavicular Lymph Nodes Enlarged Bilaterally] : no supraclavicular lymphadenopathy [Cervical Lymph Nodes Enlarged Anterior Bilaterally] : no anterior cervical lymphadenopathy [No CVA Tenderness] : no CVA  tenderness [No Spinal Tenderness] : no spinal tenderness [Abnormal Walk] : normal gait [] : no rash [No Focal Deficits] : no focal deficits [Oriented To Time, Place, And Person] : oriented to person, place, and time [Normal Insight/Judgment] : insight and judgment were intact [de-identified] : mild generalized tenderness

## 2022-12-29 RX ORDER — INFLIXIMAB-DYYB 120 MG/ML
350 INJECTION SUBCUTANEOUS ONCE
Refills: 0 | Status: COMPLETED | OUTPATIENT
Start: 2023-01-13 | End: 2023-01-13

## 2022-12-29 RX ORDER — INFLIXIMAB-DYYB 120 MG/ML
700 INJECTION SUBCUTANEOUS ONCE
Refills: 0 | Status: COMPLETED | OUTPATIENT
Start: 2023-02-10 | End: 2023-02-10

## 2022-12-30 ENCOUNTER — APPOINTMENT (OUTPATIENT)
Dept: INFUSION THERAPY | Facility: CLINIC | Age: 27
End: 2022-12-30

## 2022-12-30 ENCOUNTER — OUTPATIENT (OUTPATIENT)
Dept: OUTPATIENT SERVICES | Facility: HOSPITAL | Age: 27
LOS: 1 days | End: 2022-12-30
Payer: MEDICAID

## 2022-12-30 VITALS
DIASTOLIC BLOOD PRESSURE: 74 MMHG | OXYGEN SATURATION: 98 % | HEART RATE: 74 BPM | SYSTOLIC BLOOD PRESSURE: 100 MMHG | TEMPERATURE: 98 F | RESPIRATION RATE: 17 BRPM

## 2022-12-30 DIAGNOSIS — K51.90 ULCERATIVE COLITIS, UNSPECIFIED, WITHOUT COMPLICATIONS: ICD-10-CM

## 2022-12-30 LAB
ALBUMIN SERPL ELPH-MCNC: 4.2 G/DL — SIGNIFICANT CHANGE UP (ref 3.3–5)
ALP SERPL-CCNC: 59 U/L — SIGNIFICANT CHANGE UP (ref 40–120)
ALT FLD-CCNC: 26 U/L — SIGNIFICANT CHANGE UP (ref 10–45)
ANION GAP SERPL CALC-SCNC: 13 MMOL/L — SIGNIFICANT CHANGE UP (ref 5–17)
AST SERPL-CCNC: 24 U/L — SIGNIFICANT CHANGE UP (ref 10–40)
BILIRUB SERPL-MCNC: 0.4 MG/DL — SIGNIFICANT CHANGE UP (ref 0.2–1.2)
BUN SERPL-MCNC: 11 MG/DL — SIGNIFICANT CHANGE UP (ref 7–23)
CALCIUM SERPL-MCNC: 9.9 MG/DL — SIGNIFICANT CHANGE UP (ref 8.4–10.5)
CHLORIDE SERPL-SCNC: 100 MMOL/L — SIGNIFICANT CHANGE UP (ref 96–108)
CO2 SERPL-SCNC: 27 MMOL/L — SIGNIFICANT CHANGE UP (ref 22–31)
CREAT SERPL-MCNC: 0.89 MG/DL — SIGNIFICANT CHANGE UP (ref 0.5–1.3)
CRP SERPL-MCNC: 11 MG/L — HIGH (ref 0–4)
EGFR: 121 ML/MIN/1.73M2 — SIGNIFICANT CHANGE UP
GLUCOSE SERPL-MCNC: 100 MG/DL — HIGH (ref 70–99)
HCT VFR BLD CALC: 45.1 % — SIGNIFICANT CHANGE UP (ref 39–50)
HGB BLD-MCNC: 13.8 G/DL — SIGNIFICANT CHANGE UP (ref 13–17)
MCHC RBC-ENTMCNC: 23.8 PG — LOW (ref 27–34)
MCHC RBC-ENTMCNC: 30.6 GM/DL — LOW (ref 32–36)
MCV RBC AUTO: 77.6 FL — LOW (ref 80–100)
NRBC # BLD: 0 /100 WBCS — SIGNIFICANT CHANGE UP (ref 0–0)
PLATELET # BLD AUTO: 365 K/UL — SIGNIFICANT CHANGE UP (ref 150–400)
POTASSIUM SERPL-MCNC: 4.3 MMOL/L — SIGNIFICANT CHANGE UP (ref 3.5–5.3)
POTASSIUM SERPL-SCNC: 4.3 MMOL/L — SIGNIFICANT CHANGE UP (ref 3.5–5.3)
PROT SERPL-MCNC: 7.9 G/DL — SIGNIFICANT CHANGE UP (ref 6–8.3)
RBC # BLD: 5.81 M/UL — HIGH (ref 4.2–5.8)
RBC # FLD: 12.5 % — SIGNIFICANT CHANGE UP (ref 10.3–14.5)
SODIUM SERPL-SCNC: 140 MMOL/L — SIGNIFICANT CHANGE UP (ref 135–145)
WBC # BLD: 8.89 K/UL — SIGNIFICANT CHANGE UP (ref 3.8–10.5)
WBC # FLD AUTO: 8.89 K/UL — SIGNIFICANT CHANGE UP (ref 3.8–10.5)

## 2022-12-30 PROCEDURE — 80053 COMPREHEN METABOLIC PANEL: CPT

## 2022-12-30 PROCEDURE — 96413 CHEMO IV INFUSION 1 HR: CPT

## 2022-12-30 PROCEDURE — 85027 COMPLETE CBC AUTOMATED: CPT

## 2022-12-30 PROCEDURE — 86140 C-REACTIVE PROTEIN: CPT

## 2022-12-30 PROCEDURE — 96415 CHEMO IV INFUSION ADDL HR: CPT

## 2022-12-30 PROCEDURE — 36415 COLL VENOUS BLD VENIPUNCTURE: CPT

## 2022-12-30 RX ORDER — INFLIXIMAB-DYYB 120 MG/ML
350 INJECTION SUBCUTANEOUS ONCE
Refills: 0 | Status: COMPLETED | OUTPATIENT
Start: 2022-12-30 | End: 2022-12-30

## 2022-12-30 RX ADMIN — INFLIXIMAB-DYYB 125 MILLIGRAM(S): 120 INJECTION SUBCUTANEOUS at 09:30

## 2022-12-30 RX ADMIN — INFLIXIMAB-DYYB 350 MILLIGRAM(S): 120 INJECTION SUBCUTANEOUS at 11:20

## 2023-01-13 ENCOUNTER — APPOINTMENT (OUTPATIENT)
Dept: INFUSION THERAPY | Facility: CLINIC | Age: 28
End: 2023-01-13

## 2023-01-13 ENCOUNTER — OUTPATIENT (OUTPATIENT)
Dept: OUTPATIENT SERVICES | Facility: HOSPITAL | Age: 28
LOS: 1 days | End: 2023-01-13
Payer: MEDICAID

## 2023-01-13 VITALS
TEMPERATURE: 97 F | SYSTOLIC BLOOD PRESSURE: 101 MMHG | RESPIRATION RATE: 18 BRPM | OXYGEN SATURATION: 99 % | HEART RATE: 88 BPM | DIASTOLIC BLOOD PRESSURE: 68 MMHG

## 2023-01-13 DIAGNOSIS — K50.90 CROHN'S DISEASE, UNSPECIFIED, WITHOUT COMPLICATIONS: ICD-10-CM

## 2023-01-13 LAB
ALBUMIN SERPL ELPH-MCNC: 3.7 G/DL — SIGNIFICANT CHANGE UP (ref 3.3–5)
ALP SERPL-CCNC: 45 U/L — SIGNIFICANT CHANGE UP (ref 40–120)
ALT FLD-CCNC: 17 U/L — SIGNIFICANT CHANGE UP (ref 10–45)
ANION GAP SERPL CALC-SCNC: 8 MMOL/L — SIGNIFICANT CHANGE UP (ref 5–17)
AST SERPL-CCNC: 13 U/L — SIGNIFICANT CHANGE UP (ref 10–40)
BILIRUB SERPL-MCNC: 0.5 MG/DL — SIGNIFICANT CHANGE UP (ref 0.2–1.2)
BUN SERPL-MCNC: 8 MG/DL — SIGNIFICANT CHANGE UP (ref 7–23)
CALCIUM SERPL-MCNC: 9.1 MG/DL — SIGNIFICANT CHANGE UP (ref 8.4–10.5)
CHLORIDE SERPL-SCNC: 104 MMOL/L — SIGNIFICANT CHANGE UP (ref 96–108)
CO2 SERPL-SCNC: 29 MMOL/L — SIGNIFICANT CHANGE UP (ref 22–31)
CREAT SERPL-MCNC: 0.92 MG/DL — SIGNIFICANT CHANGE UP (ref 0.5–1.3)
CRP SERPL-MCNC: 8 MG/L — HIGH (ref 0–4)
EGFR: 118 ML/MIN/1.73M2 — SIGNIFICANT CHANGE UP
GLUCOSE SERPL-MCNC: 87 MG/DL — SIGNIFICANT CHANGE UP (ref 70–99)
HCT VFR BLD CALC: 39.9 % — SIGNIFICANT CHANGE UP (ref 39–50)
HGB BLD-MCNC: 12.3 G/DL — LOW (ref 13–17)
MCHC RBC-ENTMCNC: 24 PG — LOW (ref 27–34)
MCHC RBC-ENTMCNC: 30.8 GM/DL — LOW (ref 32–36)
MCV RBC AUTO: 77.9 FL — LOW (ref 80–100)
NRBC # BLD: 0 /100 WBCS — SIGNIFICANT CHANGE UP (ref 0–0)
PLATELET # BLD AUTO: 293 K/UL — SIGNIFICANT CHANGE UP (ref 150–400)
POTASSIUM SERPL-MCNC: 4.1 MMOL/L — SIGNIFICANT CHANGE UP (ref 3.5–5.3)
POTASSIUM SERPL-SCNC: 4.1 MMOL/L — SIGNIFICANT CHANGE UP (ref 3.5–5.3)
PROT SERPL-MCNC: 6.8 G/DL — SIGNIFICANT CHANGE UP (ref 6–8.3)
RBC # BLD: 5.12 M/UL — SIGNIFICANT CHANGE UP (ref 4.2–5.8)
RBC # FLD: 12.9 % — SIGNIFICANT CHANGE UP (ref 10.3–14.5)
SODIUM SERPL-SCNC: 141 MMOL/L — SIGNIFICANT CHANGE UP (ref 135–145)
WBC # BLD: 7.07 K/UL — SIGNIFICANT CHANGE UP (ref 3.8–10.5)
WBC # FLD AUTO: 7.07 K/UL — SIGNIFICANT CHANGE UP (ref 3.8–10.5)

## 2023-01-13 PROCEDURE — 96415 CHEMO IV INFUSION ADDL HR: CPT

## 2023-01-13 PROCEDURE — 85027 COMPLETE CBC AUTOMATED: CPT

## 2023-01-13 PROCEDURE — 96413 CHEMO IV INFUSION 1 HR: CPT

## 2023-01-13 PROCEDURE — 36415 COLL VENOUS BLD VENIPUNCTURE: CPT

## 2023-01-13 PROCEDURE — 80053 COMPREHEN METABOLIC PANEL: CPT

## 2023-01-13 PROCEDURE — 86140 C-REACTIVE PROTEIN: CPT

## 2023-01-13 RX ADMIN — INFLIXIMAB-DYYB 350 MILLIGRAM(S): 120 INJECTION SUBCUTANEOUS at 11:00

## 2023-01-13 RX ADMIN — INFLIXIMAB-DYYB 125 MILLIGRAM(S): 120 INJECTION SUBCUTANEOUS at 09:15

## 2023-01-25 ENCOUNTER — APPOINTMENT (OUTPATIENT)
Dept: GASTROENTEROLOGY | Facility: CLINIC | Age: 28
End: 2023-01-25
Payer: MEDICAID

## 2023-01-25 VITALS
DIASTOLIC BLOOD PRESSURE: 70 MMHG | OXYGEN SATURATION: 98 % | HEART RATE: 96 BPM | WEIGHT: 145 LBS | TEMPERATURE: 96.8 F | SYSTOLIC BLOOD PRESSURE: 110 MMHG | RESPIRATION RATE: 16 BRPM | HEIGHT: 70 IN | BODY MASS INDEX: 20.76 KG/M2

## 2023-01-25 PROCEDURE — 99214 OFFICE O/P EST MOD 30 MIN: CPT

## 2023-01-26 ENCOUNTER — TRANSCRIPTION ENCOUNTER (OUTPATIENT)
Age: 28
End: 2023-01-26

## 2023-01-26 LAB
ALBUMIN SERPL ELPH-MCNC: 4.1 G/DL
ALP BLD-CCNC: 47 U/L
ALT SERPL-CCNC: 16 U/L
ANION GAP SERPL CALC-SCNC: 10 MMOL/L
AST SERPL-CCNC: 11 U/L
BASOPHILS # BLD AUTO: 0.04 K/UL
BASOPHILS NFR BLD AUTO: 0.4 %
BILIRUB SERPL-MCNC: 0.3 MG/DL
BUN SERPL-MCNC: 12 MG/DL
CALCIUM SERPL-MCNC: 9.5 MG/DL
CHLORIDE SERPL-SCNC: 101 MMOL/L
CO2 SERPL-SCNC: 30 MMOL/L
CREAT SERPL-MCNC: 0.96 MG/DL
CRP SERPL-MCNC: 15 MG/L
EGFR: 112 ML/MIN/1.73M2
EOSINOPHIL # BLD AUTO: 0.22 K/UL
EOSINOPHIL NFR BLD AUTO: 2.2 %
FOLATE SERPL-MCNC: 5.3 NG/ML
GLUCOSE SERPL-MCNC: 90 MG/DL
HCT VFR BLD CALC: 42 %
HGB BLD-MCNC: 13.1 G/DL
IMM GRANULOCYTES NFR BLD AUTO: 0.3 %
IRON SATN MFR SERPL: 3 %
IRON SERPL-MCNC: 14 UG/DL
LYMPHOCYTES # BLD AUTO: 3.14 K/UL
LYMPHOCYTES NFR BLD AUTO: 31.4 %
MAN DIFF?: NORMAL
MCHC RBC-ENTMCNC: 24.1 PG
MCHC RBC-ENTMCNC: 31.2 GM/DL
MCV RBC AUTO: 77.2 FL
MONOCYTES # BLD AUTO: 1.2 K/UL
MONOCYTES NFR BLD AUTO: 12 %
NEUTROPHILS # BLD AUTO: 5.36 K/UL
NEUTROPHILS NFR BLD AUTO: 53.7 %
PLATELET # BLD AUTO: 371 K/UL
POTASSIUM SERPL-SCNC: 3.9 MMOL/L
PROT SERPL-MCNC: 6.7 G/DL
RBC # BLD: 5.44 M/UL
RBC # FLD: 13.2 %
SODIUM SERPL-SCNC: 141 MMOL/L
TIBC SERPL-MCNC: 422 UG/DL
UIBC SERPL-MCNC: 408 UG/DL
VIT B12 SERPL-MCNC: 385 PG/ML
WBC # FLD AUTO: 9.99 K/UL

## 2023-01-27 NOTE — PHYSICAL EXAM
[Alert] : alert [No Acute Distress] : no acute distress [Sclera] : the sclera and conjunctiva were normal [Hearing Threshold Finger Rub Not [place]] : hearing was normal [Normal Appearance] : the appearance of the neck was normal [No Acc Muscle Use] : no accessory muscle use [Auscultation Breath Sounds / Voice Sounds] : lungs were clear to auscultation bilaterally [Heart Rate And Rhythm] : heart rate was normal and rhythm regular [Normal S1, S2] : normal S1 and S2 [Bowel Sounds] : normal bowel sounds [No Masses] : no abdominal mass palpated [Abdomen Soft] : soft [Supraclavicular Lymph Nodes Enlarged Bilaterally] : no supraclavicular lymphadenopathy [Cervical Lymph Nodes Enlarged Anterior Bilaterally] : no anterior cervical lymphadenopathy [No CVA Tenderness] : no CVA  tenderness [No Spinal Tenderness] : no spinal tenderness [Abnormal Walk] : normal gait [] : no rash [No Focal Deficits] : no focal deficits [Oriented To Time, Place, And Person] : oriented to person, place, and time [Normal Insight/Judgment] : insight and judgment were intact [de-identified] : mild generalized tenderness

## 2023-01-27 NOTE — HISTORY OF PRESENT ILLNESS
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 Pathology neg for dysplasia. Pt currently still on 15mg prednisone with continued >10 loose BM daily with urgency, abdominal pain, and intermittent bleeding. Pt accompanied by Mom today and took part in interview as per pt request. \par \par Prior HPI:\par \par Pt is a 26 y/o male with tentative diagnosis of UC diagnosed 1/2022 presenting to [facility] care after insurance change, pt of Dr Soriano. Initial symptoms of urgency, rectal bleeding, and loose stool; colonoscopy 1/2022 with proctosigmoiditis and cecal erythema extending toward ascending colon and treated with PO budesonide and mesalamine suppository with control of symptoms. Pt had flare in 6/2022, was hospitalized at [facility], had CT with pancolitis, and treated with IV steroids with good response. He was discharged on PO prednisone and started on vedo in June. He is now s/p 1 maintenance dose of Vedo and has tapered to 15 mg with no recurrence of rectal bleeding but noticing incontinence of loose stools. 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 Also noted to have torn R acetabular labrum on MRI 6/2022 performed for R hip pain which has since resolved after steroids. Telemed today with his mom - pt began steroid taper with slight worsening of symptoms. \par \par Since going to 10mg prednisone he's noticed inc in rectal bleeding with each BM - mostly when wiping. + urgency, has not started fiber. Having 7-8 BMs per day. Good appetite. Still having nocturnal stools. No weight loss. No n/v. \par \par Mom has Hx of UC which is in remission; not currently being treated or hx of surgery.\par \par Denies smoking, alcohol, marijuana, or illicit drug use.

## 2023-01-27 NOTE — ASSESSMENT
[FreeTextEntry1] : Pt is a 26 y/o male with previous Dx of UC diagnosed 1/2022, previously on VDZ (last dose 12/1/22) presenting s/p colonoscopy 12/6/2022 with severe inflammation from rectum to mid transverse colon, ICV ulceration, and several TI aphthae leading to Dx of ileocolonic CD. Pathology neg for dysplasia. Pt currently still on 20 mg prednisone with continued >10 loose BM daily with urgency, abdominal pain, and intermittent bleeding. Now s/p 2 loading doses of Avsola 5mg/kg. Pt accompanied by Mom today and took part in interview as per pt request. \par \par # Ileocolonic CD; in flare on prednisone 20mg despite IFX 2 loading doses \par - given that he has significant symptoms despite being on steroids and IFX, we discussed doubling IFX dose to 10mg/kg and referral to surgery; if next dose does not improve symptoms significantly will consider Risankizumab as third biologic \par - We detailed increased risk of infections (bacterial, fungal, viral) which we will mitigate by immunizing in appropriate fashion (HBV,zoster, flu, pneumovax); risk of malignancy with time spent on lymphoma (HSTCL in young adults) and skin cancers (need for derm annual eval) ; risk of liver injury, bone marrow suppression, CNS disorders, allergic and infusion (if IV admin) reactions, idiosyncratic skin reactions, joint problems, among other rare and unusual manifestations. We discussed the need to notify if fevers or major illness prior to infusions, and the need to maintain follow up and obtain request labs and evaluations. In addition, we have already discusses resources such as [facility] and the manufacturers "patients" page to obtain additional detailed information on the medication. \par - repeat quant today (previous indeterminate with cxr wnl 6/2022) \par - defer to pharmacy or PMD for Hep B booster\par - counselled on Vit D+calcium while on steroids\par - dexa after off steroids\par - pt confirms his psychiatrist aware of him using steroids and denies any significant mood changes on steroids\par - plan to be shared with pts PMD, Dr Toña Luz\par - referral to colorectal to discuss surgical options given he may be failing second biologic \par - approx interval \par \par ?hx of low IgG1 vs lyme\par - following at [facility] infectious disease and planning for further w/u once off steroids\par \par #?Bipolar\par - Under the care of a psychiatrist but not currently on any medications.\par - We will plan to avoid TCA class of medications in him.\par - Pt demonstrates capacity but has given permission to involve mom in decision making\par \par F/U after 3rd loading dose

## 2023-01-27 NOTE — CONSULT LETTER
[Dear  ___] : Dear  [unfilled], [Courtesy Letter:] : I had the pleasure of seeing your patient, [unfilled], in my office today. [Sincerely,] : Sincerely, [FreeTextEntry3] : Alexey Cai MD\par Professor of Medicine\par Chief of GI\par Director IBD Program\par [facility]\par

## 2023-01-31 ENCOUNTER — NON-APPOINTMENT (OUTPATIENT)
Age: 28
End: 2023-01-31

## 2023-02-10 ENCOUNTER — OUTPATIENT (OUTPATIENT)
Dept: OUTPATIENT SERVICES | Facility: HOSPITAL | Age: 28
LOS: 1 days | End: 2023-02-10
Payer: MEDICAID

## 2023-02-10 ENCOUNTER — APPOINTMENT (OUTPATIENT)
Dept: INFUSION THERAPY | Facility: CLINIC | Age: 28
End: 2023-02-10

## 2023-02-10 VITALS
RESPIRATION RATE: 18 BRPM | OXYGEN SATURATION: 99 % | TEMPERATURE: 98 F | WEIGHT: 145.06 LBS | HEART RATE: 99 BPM | SYSTOLIC BLOOD PRESSURE: 105 MMHG | HEIGHT: 71 IN | DIASTOLIC BLOOD PRESSURE: 69 MMHG

## 2023-02-10 VITALS
OXYGEN SATURATION: 99 % | RESPIRATION RATE: 17 BRPM | HEART RATE: 99 BPM | TEMPERATURE: 98 F | SYSTOLIC BLOOD PRESSURE: 102 MMHG | DIASTOLIC BLOOD PRESSURE: 66 MMHG

## 2023-02-10 DIAGNOSIS — K50.90 CROHN'S DISEASE, UNSPECIFIED, WITHOUT COMPLICATIONS: ICD-10-CM

## 2023-02-10 LAB
ALBUMIN SERPL ELPH-MCNC: 3.7 G/DL — SIGNIFICANT CHANGE UP (ref 3.3–5)
ALP SERPL-CCNC: 43 U/L — SIGNIFICANT CHANGE UP (ref 40–120)
ALT FLD-CCNC: 14 U/L — SIGNIFICANT CHANGE UP (ref 10–45)
ANION GAP SERPL CALC-SCNC: 8 MMOL/L — SIGNIFICANT CHANGE UP (ref 5–17)
AST SERPL-CCNC: 11 U/L — SIGNIFICANT CHANGE UP (ref 10–40)
BILIRUB SERPL-MCNC: 0.5 MG/DL — SIGNIFICANT CHANGE UP (ref 0.2–1.2)
BUN SERPL-MCNC: 10 MG/DL — SIGNIFICANT CHANGE UP (ref 7–23)
CALCIUM SERPL-MCNC: 9.4 MG/DL — SIGNIFICANT CHANGE UP (ref 8.4–10.5)
CHLORIDE SERPL-SCNC: 96 MMOL/L — SIGNIFICANT CHANGE UP (ref 96–108)
CO2 SERPL-SCNC: 33 MMOL/L — HIGH (ref 22–31)
CREAT SERPL-MCNC: 0.9 MG/DL — SIGNIFICANT CHANGE UP (ref 0.5–1.3)
CRP SERPL-MCNC: 13.6 MG/L — HIGH (ref 0–4)
EGFR: 121 ML/MIN/1.73M2 — SIGNIFICANT CHANGE UP
GLUCOSE SERPL-MCNC: 126 MG/DL — HIGH (ref 70–99)
HCT VFR BLD CALC: 44.5 % — SIGNIFICANT CHANGE UP (ref 39–50)
HGB BLD-MCNC: 13.9 G/DL — SIGNIFICANT CHANGE UP (ref 13–17)
MCHC RBC-ENTMCNC: 23.8 PG — LOW (ref 27–34)
MCHC RBC-ENTMCNC: 31.2 GM/DL — LOW (ref 32–36)
MCV RBC AUTO: 76.1 FL — LOW (ref 80–100)
NRBC # BLD: 0 /100 WBCS — SIGNIFICANT CHANGE UP (ref 0–0)
PLATELET # BLD AUTO: 398 K/UL — SIGNIFICANT CHANGE UP (ref 150–400)
POTASSIUM SERPL-MCNC: 3.3 MMOL/L — LOW (ref 3.5–5.3)
POTASSIUM SERPL-SCNC: 3.3 MMOL/L — LOW (ref 3.5–5.3)
PROT SERPL-MCNC: 6.9 G/DL — SIGNIFICANT CHANGE UP (ref 6–8.3)
RBC # BLD: 5.85 M/UL — HIGH (ref 4.2–5.8)
RBC # FLD: 13.2 % — SIGNIFICANT CHANGE UP (ref 10.3–14.5)
SODIUM SERPL-SCNC: 137 MMOL/L — SIGNIFICANT CHANGE UP (ref 135–145)
WBC # BLD: 11.13 K/UL — HIGH (ref 3.8–10.5)
WBC # FLD AUTO: 11.13 K/UL — HIGH (ref 3.8–10.5)

## 2023-02-10 PROCEDURE — 82542 COL CHROMOTOGRAPHY QUAL/QUAN: CPT

## 2023-02-10 PROCEDURE — 86140 C-REACTIVE PROTEIN: CPT

## 2023-02-10 PROCEDURE — 36415 COLL VENOUS BLD VENIPUNCTURE: CPT

## 2023-02-10 PROCEDURE — 80053 COMPREHEN METABOLIC PANEL: CPT

## 2023-02-10 PROCEDURE — 80230 DRUG ASSAY INFLIXIMAB: CPT

## 2023-02-10 PROCEDURE — 96413 CHEMO IV INFUSION 1 HR: CPT

## 2023-02-10 PROCEDURE — 96415 CHEMO IV INFUSION ADDL HR: CPT

## 2023-02-10 PROCEDURE — 85027 COMPLETE CBC AUTOMATED: CPT

## 2023-02-10 RX ADMIN — INFLIXIMAB-DYYB 700 MILLIGRAM(S): 120 INJECTION SUBCUTANEOUS at 17:20

## 2023-02-10 RX ADMIN — INFLIXIMAB-DYYB 125 MILLIGRAM(S): 120 INJECTION SUBCUTANEOUS at 14:18

## 2023-02-17 ENCOUNTER — APPOINTMENT (OUTPATIENT)
Dept: GASTROENTEROLOGY | Facility: CLINIC | Age: 28
End: 2023-02-17
Payer: MEDICAID

## 2023-02-17 PROCEDURE — 99214 OFFICE O/P EST MOD 30 MIN: CPT | Mod: 95

## 2023-02-18 NOTE — HISTORY OF PRESENT ILLNESS
[Home] : at home, [unfilled] , at the time of the visit. [Medical Office: ([facility])___] : at the medical office located in  [Verbal consent obtained from patient] : the patient, [unfilled] [FreeTextEntry1] : Pt is a 28 y/o male with previous Dx of UC diagnosed 1/2022, now on IFX s/p 3 loading doses now with undetectable drug levels with antibodies, and recent IFX infusion reaction. Previously on VDZ (last dose 12/1/22) s/p colonoscopy 12/6/2022 with severe inflammation from rectum to mid transverse colon, ICV ulceration, and several TI aphthae leading to Dx of ileocolonic CD. Pathology neg for dysplasia. Pt currently still on 30 mg prednisone with continued >10 loose BM daily with urgency, abdominal pain, nausea, and intermittent bleeding. Pt accompanied by Mom today and took part in interview as per pt request. \par \par Concern for Crohn's disease based on recent CSCOPE showing TI and ICV inflammation however based on no abnormalities on pathology, patient will still be labeled as Ulcerative Colitis. Pt has appt with Dr. Barrios on 2/24 - and has been offered to come to the hospital several times to manage his pain, nausea and inflammation but has refused. \par \par Prior HPI:\par \par Pt is a 28 y/o male with tentative diagnosis of UC diagnosed 1/2022 presenting to [facility] care after insurance change, pt of Dr Soriano. Initial symptoms of urgency, rectal bleeding, and loose stool; colonoscopy 1/2022 with proctosigmoiditis and cecal erythema extending toward ascending colon and treated with PO budesonide and mesalamine suppository with control of symptoms. Pt had flare in 6/2022, was hospitalized at [facility], had CT with pancolitis, and treated with IV steroids with good response. He was discharged on PO prednisone and started on vedo in June. He is now s/p 1 maintenance dose of Vedo and has tapered to 15 mg with no recurrence of rectal bleeding but noticing incontinence of loose stools. 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 Also noted to have torn R acetabular labrum on MRI 6/2022 performed for R hip pain which has since resolved after steroids. Telemed today with his mom - pt began steroid taper with slight worsening of symptoms. \par \par Since going to 10mg prednisone he's noticed inc in rectal bleeding with each BM - mostly when wiping. + urgency, has not started fiber. Having 7-8 BMs per day. Good appetite. Still having nocturnal stools. No weight loss. No n/v. \par \par Mom has Hx of UC which is in remission; not currently being treated or hx of surgery.\par \par Denies smoking, alcohol, marijuana, or illicit drug use.

## 2023-02-18 NOTE — CONSULT LETTER
[Dear  ___] : Dear  [unfilled], [Courtesy Letter:] : I had the pleasure of seeing your patient, [unfilled], in my office today. [Sincerely,] : Sincerely, [FreeTextEntry3] : Alexey Cai MD\par Professor of Medicine\par Chief of GI\par Director IBD Program\par [facility]\par

## 2023-02-18 NOTE — ASSESSMENT
[FreeTextEntry1] : Pt is a 28 y/o male with previous Dx of UC diagnosed 1/2022, now s/p 3 loading doses of IFX with undetectable drug + antibodies and recent infusion reaction; previously on VDZ (last dose 12/1/22) presenting s/p colonoscopy 12/6/2022 with severe inflammation from rectum to mid transverse colon, ICV ulceration, and several TI aphthae leading to Dx of ileocolonic CD. Pathology neg for dysplasia. Pt currently still on 20 mg prednisone with continued >10 loose BM daily with urgency, abdominal pain, nausea and intermittent bleeding. Now s/p 3 loading doses of Avsola 5mg/kg. Pt accompanied by Mom today and took part in interview as per pt request. \par \par # Ulcerative colitis - concern for Crohn's disease based on TI/ICV inflammation however after discussion at multidisciplinary conference with review of pathology, consensus that patient has Ulcerative Colitis; in flare on prednisone 30mg despite IFX 3 loading doses  with recent infusion reaction antibody mediated\par - given his non-response to two biologics, severe disease, will move forward with rx'ing Rinvoq as this has the fastest MOA and trialing a third biologic will likely not work based on data \par - We discussed the potential risks of upadacitinib (Rinvoq) treatment in detail with the patient. This included the increased risk of infections, which can be mitigated by checking for Tuberculosis and Hepatitis exposure and encouraging up to date immunizations with Shingrix, COVID-19, the annual flu, pneumococcal, and all other appropriate vaccine preventable illnesses prior to starting therapy. Live vaccines should be avoided while on therapy. We discussed the increased risk of blood clots and cardiovascular events especially in those over the age of 50, and to notify the provider with any signs/symptoms of DVT/PE including shortness of breath, chest pain, or unilateral extremity swelling, discoloration, or pain. The potential risk of cancers including but not limited to lymphoma and skin cancer, and the need for frequent lab monitoring, physical exams, and dermatological visits while on this treatment.  Given the increased risk of hyperlipidemia and lab abnormalities on treatment, we will be evaluating lab work at 1 month after treatment and intermittently thereafter. For women of childbearing age intending to conceive within 3 months or are planning to breastfeed, the patient understands this medication may be teratogenic and contraception or abstinence is required.  Any plans for pregnancy should be shared with ample time to discontinue the medication and find an alternative.  All the patient's questions and concerns were discussed in detail.\par - defer to pharmacy or PMD for Hep B booster\par - counselled on Vit D+calcium while on steroids\par - dexa after off steroids\par - pt confirms his psychiatrist aware of him using steroids and denies any significant mood changes on steroids\par - plan to be shared with pts PMD, Dr Toña Luz\par - referral to colorectal to discuss surgical options - has appt 2/24\par - again discussed coming to ED if symptoms are intolerable but pt refused \par \par ?hx of low IgG1 vs lyme\par - following at [facility] infectious disease and planning for further w/u once off steroids\par \par #?Bipolar\par - Under the care of a psychiatrist but not currently on any medications.\par - We will plan to avoid TCA class of medications in him.\par - Pt demonstrates capacity but has given permission to involve mom in decision making\par \par F/U once he starts Rinvoq \par \par Plan d/w Dr. Cai

## 2023-02-24 ENCOUNTER — APPOINTMENT (OUTPATIENT)
Dept: COLORECTAL SURGERY | Facility: CLINIC | Age: 28
End: 2023-02-24
Payer: MEDICAID

## 2023-02-24 ENCOUNTER — NON-APPOINTMENT (OUTPATIENT)
Age: 28
End: 2023-02-24

## 2023-02-24 VITALS
SYSTOLIC BLOOD PRESSURE: 117 MMHG | HEART RATE: 101 BPM | DIASTOLIC BLOOD PRESSURE: 81 MMHG | TEMPERATURE: 98.4 F | WEIGHT: 134 LBS | BODY MASS INDEX: 19.18 KG/M2 | HEIGHT: 70 IN

## 2023-02-24 DIAGNOSIS — Z81.8 FAMILY HISTORY OF OTHER MENTAL AND BEHAVIORAL DISORDERS: ICD-10-CM

## 2023-02-24 DIAGNOSIS — Z87.09 PERSONAL HISTORY OF OTHER DISEASES OF THE RESPIRATORY SYSTEM: ICD-10-CM

## 2023-02-24 DIAGNOSIS — Z78.9 OTHER SPECIFIED HEALTH STATUS: ICD-10-CM

## 2023-02-24 PROCEDURE — 99205 OFFICE O/P NEW HI 60 MIN: CPT

## 2023-02-24 NOTE — ASSESSMENT
[FreeTextEntry1] : I have outlined to the patient and his mother that the findings on work-up and evaluation are consistent with inflammatory bowel disease of unclear etiology–possible Crohn's disease versus possible ulcerative colitis.  Given his overall difficulty with management of his symptoms with optimal and maximal medical therapy I have advocated for them to consider hospitalization for further treatment and potential surgical intervention.  If symptoms are unable to be controlled with medical therapy I have recommended we proceed with a laparoscopic total colectomy and ileostomy creation.  After review of the pathology, further recommendations for treatment would be most appropriate after confirmation of colitis versus Crohn's disease.  The potential of a permanent ileostomy versus possible secondary procedure including completion proctectomy/possible J-pouch if colitis is determined to be the current correct diagnosis have been outlined.  All questions answered.\par \par I have stressed to the patient and his mother that the hospitalization would be most appropriate if his symptoms do not improve within the forthcoming 3 to 7 days or if symptoms worsen at any time.\par \par
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
## 2023-03-06 ENCOUNTER — APPOINTMENT (OUTPATIENT)
Dept: GASTROENTEROLOGY | Facility: CLINIC | Age: 28
End: 2023-03-06
Payer: MEDICAID

## 2023-03-06 PROCEDURE — 99214 OFFICE O/P EST MOD 30 MIN: CPT | Mod: 95

## 2023-03-06 NOTE — ASSESSMENT
[FreeTextEntry1] : Pt is a 28 y/o male with previous Dx of UC diagnosed 1/2022, failed IFX s/p 3 loading doses with undetectable drug levels with antibodies, and recent IFX infusion reaction. Previously on VDZ (last dose 12/1/22) s/p colonoscopy 12/6/2022 with severe inflammation from rectum to mid transverse colon, ICV ulceration, and several TI aphthae leading to Dx of ileocolonic CD. Pathology neg for dysplasia. Pt currently still on 30 mg prednisone and began Rinvoq 10 days ago with improvement. Pt accompanied by Mom today and took part in interview as per pt request\par \par # Ulcerative colitis - concern for Crohn's disease based on TI/ICV inflammation however after discussion at multidisciplinary conference with review of pathology, consensus that patient has Ulcerative Colitis; in flare on prednisone 30mg despite IFX 3 loading doses  with recent infusion reaction antibody mediated\par - given his non-response to two biologics, severe disease, he started Rinvoq 10 days ago with 50% improvement\par - continue Rinvoq, and 30mg prednisone; can begin steroid taper once in clinical remission; discussed this plan in detail with patient and mom; he will reach out after 3-4 days of clinical remission to discuss taper \par - We discussed the potential risks of upadacitinib (Rinvoq) treatment in detail with the patient. This included the increased risk of infections, which can be mitigated by checking for Tuberculosis and Hepatitis exposure and encouraging up to date immunizations with Shingrix, COVID-19, the annual flu, pneumococcal, and all other appropriate vaccine preventable illnesses prior to starting therapy. Live vaccines should be avoided while on therapy. We discussed the increased risk of blood clots and cardiovascular events especially in those over the age of 50, and to notify the provider with any signs/symptoms of DVT/PE including shortness of breath, chest pain, or unilateral extremity swelling, discoloration, or pain. The potential risk of cancers including but not limited to lymphoma and skin cancer, and the need for frequent lab monitoring, physical exams, and dermatological visits while on this treatment.  Given the increased risk of hyperlipidemia and lab abnormalities on treatment, we will be evaluating lab work at 1 month after treatment and intermittently thereafter. For women of childbearing age intending to conceive within 3 months or are planning to breastfeed, the patient understands this medication may be teratogenic and contraception or abstinence is required.  Any plans for pregnancy should be shared with ample time to discontinue the medication and find an alternative.  All the patient's questions and concerns were discussed in detail.\par - defer to pharmacy or PMD for Hep B booster\par - counselled on Vit D+calcium while on steroids\par - dexa after off steroids\par - pt confirms his psychiatrist aware of him using steroids and denies any significant mood changes on steroids\par - plan to be shared with pts PMD, Dr Toña Luz\par - referred to colorectal to discuss surgical options - saw Dr. Barrios last week\par \par ?hx of low IgG1 vs lyme\par - following at [facility] infectious disease and planning for further w/u once off steroids\par \par #?Bipolar\par - Under the care of a psychiatrist but not currently on any medications.\par - We will plan to avoid TCA class of medications in him.\par - Pt demonstrates capacity but has given permission to involve mom in decision making\par \par F/U for labs next week, the in person week after to review

## 2023-03-06 NOTE — HISTORY OF PRESENT ILLNESS
[Home] : at home, [unfilled] , at the time of the visit. [Medical Office: ([facility])___] : at the medical office located in  [Verbal consent obtained from patient] : the patient, [unfilled] [FreeTextEntry1] : Pt is a 26 y/o male with previous Dx of UC diagnosed 1/2022, failed IFX s/p 3 loading doses with undetectable drug levels with antibodies, and recent IFX infusion reaction. Previously on VDZ (last dose 12/1/22) s/p colonoscopy 12/6/2022 with severe inflammation from rectum to mid transverse colon, ICV ulceration, and several TI aphthae leading to Dx of ileocolonic CD. Pathology neg for dysplasia. Pt currently still on 30 mg prednisone and began Rinvoq 10 days ago with improvement. Pt accompanied by Mom today and took part in interview as per pt request. \par \par Now reporting BMs down to 5 per day (50% improvement). Prednisone dose has not changed. Nausea improved. Less incontinence, can be able to leave the house a bit. Still tired/fatigued. No further rectal bleeding. Semi-formed stools. Pain remains but better. \par \par Concern for Crohn's disease based on recent CSCOPE showing TI and ICV inflammation however based on no abnormalities on pathology, patient will still be labeled as Ulcerative Colitis. Pt has appt with Dr. Barrios on 2/24 - and has been offered to come to the hospital several times to manage his pain, nausea and inflammation but has refused. \par \par Prior HPI:\par \par Pt is a 26 y/o male with tentative diagnosis of UC diagnosed 1/2022 presenting to [facility] care after insurance change, pt of Dr Soriano. Initial symptoms of urgency, rectal bleeding, and loose stool; colonoscopy 1/2022 with proctosigmoiditis and cecal erythema extending toward ascending colon and treated with PO budesonide and mesalamine suppository with control of symptoms. Pt had flare in 6/2022, was hospitalized at [facility], had CT with pancolitis, and treated with IV steroids with good response. He was discharged on PO prednisone and started on vedo in June. He is now s/p 1 maintenance dose of Vedo and has tapered to 15 mg with no recurrence of rectal bleeding but noticing incontinence of loose stools. 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 Also noted to have torn R acetabular labrum on MRI 6/2022 performed for R hip pain which has since resolved after steroids. Telemed today with his mom - pt began steroid taper with slight worsening of symptoms. \par \par Since going to 10mg prednisone he's noticed inc in rectal bleeding with each BM - mostly when wiping. + urgency, has not started fiber. Having 7-8 BMs per day. Good appetite. Still having nocturnal stools. No weight loss. No n/v. \par \par Mom has Hx of UC which is in remission; not currently being treated or hx of surgery.\par \par Denies smoking, alcohol, marijuana, or illicit drug use.

## 2023-03-06 NOTE — CONSULT LETTER
[Dear  ___] : Dear  [unfilled], [Courtesy Letter:] : I had the pleasure of seeing your patient, [unfilled], in my office today. [Sincerely,] : Sincerely, [FreeTextEntry3] : Alexey Cai MD\par Professor of Medicine\par Chief of GI\par Director IBD Program\par [facility]\par

## 2023-03-10 LAB
ALBUMIN SERPL ELPH-MCNC: 3.9 G/DL
ALP BLD-CCNC: 38 U/L
ALT SERPL-CCNC: 28 U/L
ANION GAP SERPL CALC-SCNC: 12 MMOL/L
AST SERPL-CCNC: 18 U/L
BASOPHILS # BLD AUTO: 0.01 K/UL
BASOPHILS NFR BLD AUTO: 0.1 %
BILIRUB SERPL-MCNC: 0.4 MG/DL
BUN SERPL-MCNC: 15 MG/DL
CALCIUM SERPL-MCNC: 9 MG/DL
CHLORIDE SERPL-SCNC: 102 MMOL/L
CO2 SERPL-SCNC: 27 MMOL/L
CREAT SERPL-MCNC: 0.77 MG/DL
CRP SERPL-MCNC: <3 MG/L
EGFR: 127 ML/MIN/1.73M2
EOSINOPHIL # BLD AUTO: 0.02 K/UL
EOSINOPHIL NFR BLD AUTO: 0.3 %
GLUCOSE SERPL-MCNC: 70 MG/DL
HCT VFR BLD CALC: 42.7 %
HGB BLD-MCNC: 12.4 G/DL
IMM GRANULOCYTES NFR BLD AUTO: 0.6 %
LYMPHOCYTES # BLD AUTO: 2.24 K/UL
LYMPHOCYTES NFR BLD AUTO: 30.9 %
MAN DIFF?: NORMAL
MCHC RBC-ENTMCNC: 23.3 PG
MCHC RBC-ENTMCNC: 29 GM/DL
MCV RBC AUTO: 80.3 FL
MONOCYTES # BLD AUTO: 0.8 K/UL
MONOCYTES NFR BLD AUTO: 11 %
NEUTROPHILS # BLD AUTO: 4.15 K/UL
NEUTROPHILS NFR BLD AUTO: 57.1 %
PLATELET # BLD AUTO: 454 K/UL
POTASSIUM SERPL-SCNC: 4.3 MMOL/L
PROT SERPL-MCNC: 6 G/DL
RBC # BLD: 5.32 M/UL
RBC # FLD: 14.8 %
SODIUM SERPL-SCNC: 141 MMOL/L
WBC # FLD AUTO: 7.26 K/UL

## 2023-03-17 ENCOUNTER — APPOINTMENT (OUTPATIENT)
Dept: GASTROENTEROLOGY | Facility: CLINIC | Age: 28
End: 2023-03-17
Payer: MEDICAID

## 2023-03-17 PROCEDURE — 99214 OFFICE O/P EST MOD 30 MIN: CPT | Mod: 95

## 2023-03-17 NOTE — CONSULT LETTER
[Dear  ___] : Dear  [unfilled], [Courtesy Letter:] : I had the pleasure of seeing your patient, [unfilled], in my office today. [Sincerely,] : Sincerely, [FreeTextEntry3] : Alexey Cai MD\par Professor of Medicine\par Chief of GI\par Director IBD Program\par [facility]\par

## 2023-03-17 NOTE — HISTORY OF PRESENT ILLNESS
[Home] : at home, [unfilled] , at the time of the visit. [Medical Office: ([facility])___] : at the medical office located in  [Verbal consent obtained from patient] : the patient, [unfilled] [FreeTextEntry1] : Pt is a 27 y/o male with previous Dx of UC diagnosed 1/2022, failed IFX s/p 3 loading doses with undetectable drug levels with antibodies, and recent IFX infusion reaction. Previously on VDZ (last dose 12/1/22) s/p colonoscopy 12/6/2022 with severe inflammation from rectum to mid transverse colon, ICV ulceration, and several TI aphthae leading to Dx of ileocolonic CD. Pathology neg for dysplasia. Pt currently still on 30 mg prednisone and began Rinvoq 3 weeks ago with improvement. Pt accompanied by Mom today and took part in interview as per pt request. \par \par Now reporting BMs down to 5 per day (50% improvement). Prednisone dose has not changed. Nausea resolved. Less incontinence, can be able to leave the house a bit. Still tired/fatigued. No further rectal bleeding. Semi-formed stools. Pain remains but better. \par \par Concern for Crohn's disease based on recent CSCOPE showing TI and ICV inflammation however based on no abnormalities on pathology, patient will still be labeled as Ulcerative Colitis. Pt has appt with Dr. Barrios on 2/24 - and has been offered to come to the hospital several times to manage his pain, nausea and inflammation but has refused. \par \par Prior HPI:\par \par Pt is a 28 y/o male with tentative diagnosis of UC diagnosed 1/2022 presenting to [facility] care after insurance change, pt of Dr Soriano. Initial symptoms of urgency, rectal bleeding, and loose stool; colonoscopy 1/2022 with proctosigmoiditis and cecal erythema extending toward ascending colon and treated with PO budesonide and mesalamine suppository with control of symptoms. Pt had flare in 6/2022, was hospitalized at [facility], had CT with pancolitis, and treated with IV steroids with good response. He was discharged on PO prednisone and started on vedo in June. He is now s/p 1 maintenance dose of Vedo and has tapered to 15 mg with no recurrence of rectal bleeding but noticing incontinence of loose stools. 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 Also noted to have torn R acetabular labrum on MRI 6/2022 performed for R hip pain which has since resolved after steroids. Telemed today with his mom - pt began steroid taper with slight worsening of symptoms. \par \par Since going to 10mg prednisone he's noticed inc in rectal bleeding with each BM - mostly when wiping. + urgency, has not started fiber. Having 7-8 BMs per day. Good appetite. Still having nocturnal stools. No weight loss. No n/v. \par \par Mom has Hx of UC which is in remission; not currently being treated or hx of surgery.\par \par Denies smoking, alcohol, marijuana, or illicit drug use.

## 2023-03-17 NOTE — ASSESSMENT
[FreeTextEntry1] : Pt is a 29 y/o male with previous Dx of UC diagnosed 1/2022, failed IFX s/p 3 loading doses with undetectable drug levels with antibodies, and recent IFX infusion reaction. Previously on VDZ (last dose 12/1/22) s/p colonoscopy 12/6/2022 with severe inflammation from rectum to mid transverse colon, ICV ulceration, and several TI aphthae leading to Dx of ileocolonic CD. Pathology neg for dysplasia. Pt currently still on 30 mg prednisone and began Rinvoq 3 weeks ago with improvement. Pt accompanied by Mom today and took part in interview as per pt request. \par \par # Ulcerative colitis - concern for Crohn's disease based on TI/ICV inflammation however after discussion at multidisciplinary conference with review of pathology, consensus that patient has Ulcerative Colitis; feeling better on prednisone 30mg and 3 weeks Rinvoq\par - given his non-response to two biologics, severe disease, he started Rinvoq with 50% improvement\par - continue Rinvoq, and 30mg prednisone; can begin steroid taper 5mg on Monday but very slow; discussed this plan in detail with patient and mom; he naheed stay on 25mg until his appt on 3/29 and go up to 30mg if symptoms worsen \par - We discussed the potential risks of upadacitinib (Rinvoq) treatment in detail with the patient. This included the increased risk of infections, which can be mitigated by checking for Tuberculosis and Hepatitis exposure and encouraging up to date immunizations with Shingrix, COVID-19, the annual flu, pneumococcal, and all other appropriate vaccine preventable illnesses prior to starting therapy. Live vaccines should be avoided while on therapy. We discussed the increased risk of blood clots and cardiovascular events especially in those over the age of 50, and to notify the provider with any signs/symptoms of DVT/PE including shortness of breath, chest pain, or unilateral extremity swelling, discoloration, or pain. The potential risk of cancers including but not limited to lymphoma and skin cancer, and the need for frequent lab monitoring, physical exams, and dermatological visits while on this treatment.  Given the increased risk of hyperlipidemia and lab abnormalities on treatment, we will be evaluating lab work at 1 month after treatment and intermittently thereafter. For women of childbearing age intending to conceive within 3 months or are planning to breastfeed, the patient understands this medication may be teratogenic and contraception or abstinence is required.  Any plans for pregnancy should be shared with ample time to discontinue the medication and find an alternative.  All the patient's questions and concerns were discussed in detail.\par - defer to pharmacy or PMD for Hep B booster\par - counselled on Vit D+calcium while on steroids\par - dexa after off steroids\par - pt confirms his psychiatrist aware of him using steroids and denies any significant mood changes on steroids\par - plan to be shared with pts PMD, Dr Toña Luz\par - referred to colorectal to discuss surgical options - saw Dr. Barrios last week\par \par ?hx of low IgG1 vs lyme\par - following at [facility] infectious disease and planning for further w/u once off steroids\par \par #?Bipolar\par - Under the care of a psychiatrist but not currently on any medications.\par - We will plan to avoid TCA class of medications in him.\par - Pt demonstrates capacity but has given permission to involve mom in decision making\par \par BRIJESH \par - referral to Hematologist \par \par F/U 3 weeks

## 2023-03-29 ENCOUNTER — OUTPATIENT (OUTPATIENT)
Dept: OUTPATIENT SERVICES | Facility: HOSPITAL | Age: 28
LOS: 1 days | End: 2023-03-29
Payer: MEDICAID

## 2023-03-29 ENCOUNTER — APPOINTMENT (OUTPATIENT)
Dept: GASTROENTEROLOGY | Facility: CLINIC | Age: 28
End: 2023-03-29
Payer: MEDICAID

## 2023-03-29 VITALS
OXYGEN SATURATION: 98 % | SYSTOLIC BLOOD PRESSURE: 128 MMHG | HEART RATE: 101 BPM | BODY MASS INDEX: 20.33 KG/M2 | TEMPERATURE: 96.3 F | RESPIRATION RATE: 16 BRPM | HEIGHT: 70 IN | DIASTOLIC BLOOD PRESSURE: 80 MMHG | WEIGHT: 142 LBS

## 2023-03-29 DIAGNOSIS — R68.3 CLUBBING OF FINGERS: ICD-10-CM

## 2023-03-29 PROCEDURE — 99214 OFFICE O/P EST MOD 30 MIN: CPT

## 2023-03-29 PROCEDURE — 71046 X-RAY EXAM CHEST 2 VIEWS: CPT

## 2023-03-29 PROCEDURE — 71046 X-RAY EXAM CHEST 2 VIEWS: CPT | Mod: 26

## 2023-03-30 LAB
ALBUMIN SERPL ELPH-MCNC: 4.5 G/DL
ALP BLD-CCNC: 39 U/L
ALT SERPL-CCNC: 31 U/L
ANION GAP SERPL CALC-SCNC: 11 MMOL/L
AST SERPL-CCNC: 17 U/L
BASOPHILS # BLD AUTO: 0.02 K/UL
BASOPHILS NFR BLD AUTO: 0.2 %
BILIRUB SERPL-MCNC: 0.4 MG/DL
BUN SERPL-MCNC: 14 MG/DL
CALCIUM SERPL-MCNC: 9.2 MG/DL
CHLORIDE SERPL-SCNC: 103 MMOL/L
CHOLEST SERPL-MCNC: 193 MG/DL
CO2 SERPL-SCNC: 28 MMOL/L
CREAT SERPL-MCNC: 0.84 MG/DL
CRP SERPL-MCNC: <3 MG/L
EGFR: 123 ML/MIN/1.73M2
EOSINOPHIL # BLD AUTO: 0.03 K/UL
EOSINOPHIL NFR BLD AUTO: 0.4 %
FERRITIN SERPL-MCNC: 13 NG/ML
GLUCOSE SERPL-MCNC: 94 MG/DL
HCT VFR BLD CALC: 41.2 %
HDLC SERPL-MCNC: 120 MG/DL
HGB BLD-MCNC: 12.5 G/DL
IMM GRANULOCYTES NFR BLD AUTO: 0.6 %
IRON SATN MFR SERPL: 9 %
IRON SERPL-MCNC: 45 UG/DL
LDLC SERPL CALC-MCNC: 59 MG/DL
LYMPHOCYTES # BLD AUTO: 2.64 K/UL
LYMPHOCYTES NFR BLD AUTO: 31.4 %
MAN DIFF?: NORMAL
MCHC RBC-ENTMCNC: 23.9 PG
MCHC RBC-ENTMCNC: 30.3 GM/DL
MCV RBC AUTO: 78.6 FL
MONOCYTES # BLD AUTO: 0.68 K/UL
MONOCYTES NFR BLD AUTO: 8.1 %
NEUTROPHILS # BLD AUTO: 4.99 K/UL
NEUTROPHILS NFR BLD AUTO: 59.3 %
NONHDLC SERPL-MCNC: 73 MG/DL
PLATELET # BLD AUTO: 322 K/UL
POTASSIUM SERPL-SCNC: 3.8 MMOL/L
PROT SERPL-MCNC: 6.6 G/DL
RBC # BLD: 5.24 M/UL
RBC # FLD: 15.4 %
SODIUM SERPL-SCNC: 142 MMOL/L
TIBC SERPL-MCNC: 520 UG/DL
TRANSFERRIN SERPL-MCNC: 409 MG/DL
TRIGL SERPL-MCNC: 74 MG/DL
UIBC SERPL-MCNC: 475 UG/DL
WBC # FLD AUTO: 8.41 K/UL

## 2023-04-01 NOTE — HISTORY OF PRESENT ILLNESS
[FreeTextEntry1] : Pt is a 27 y/o male with previous Dx of UC diagnosed 1/2022, failed IFX s/p 3 loading doses with undetectable drug levels with antibodies, and recent IFX infusion reaction. Previously on VDZ (last dose 12/1/22) s/p colonoscopy 12/6/2022 with severe inflammation from rectum to mid transverse colon, ICV ulceration, and several TI aphthae leading to Dx of ileocolonic CD. Pathology neg for dysplasia. Pt currently still on 25 mg prednisone and began Rinvoq 5 weeks ago with improvement. Pt accompanied by Mom and grandmother today and took part in interview as per pt request. \par \par Now reporting BMs down to 5-6 per day (50% improvement). Prednisone dose decreased with no worsening of symptoms. Nausea resolved. Less incontinence, can be able to leave the house a bit. Still tired/fatigued but that is better. No further rectal bleeding. Semi-formed stools. Pain nearly resolved.\par \par Concern for Crohn's disease based on recent CSCOPE showing TI and ICV inflammation however based on no abnormalities on pathology, patient will still be labeled as Ulcerative Colitis. Pt had appt with Dr. Barrios on 2/24 - and has been offered to come to the hospital several times to manage his pain, nausea and inflammation but has refused. \par \par Prior HPI:\par \par Pt is a 26 y/o male with tentative diagnosis of UC diagnosed 1/2022 presenting to [facility] care after insurance change, pt of Dr Soriano. Initial symptoms of urgency, rectal bleeding, and loose stool; colonoscopy 1/2022 with proctosigmoiditis and cecal erythema extending toward ascending colon and treated with PO budesonide and mesalamine suppository with control of symptoms. Pt had flare in 6/2022, was hospitalized at [facility], had CT with pancolitis, and treated with IV steroids with good response. He was discharged on PO prednisone and started on vedo in June. He is now s/p 1 maintenance dose of Vedo and has tapered to 15 mg with no recurrence of rectal bleeding but noticing incontinence of loose stools. 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 Also noted to have torn R acetabular labrum on MRI 6/2022 performed for R hip pain which has since resolved after steroids. Telemed today with his mom - pt began steroid taper with slight worsening of symptoms. \par \par Since going to 10mg prednisone he's noticed inc in rectal bleeding with each BM - mostly when wiping. + urgency, has not started fiber. Having 7-8 BMs per day. Good appetite. Still having nocturnal stools. No weight loss. No n/v. \par \par Mom has Hx of UC which is in remission; not currently being treated or hx of surgery.\par \par Denies smoking, alcohol, marijuana, or illicit drug use.

## 2023-04-01 NOTE — CONSULT LETTER
[Dear  ___] : Dear  [unfilled], [Courtesy Letter:] : I had the pleasure of seeing your patient, [unfilled], in my office today. [Sincerely,] : Sincerely, [DrAngela  ___] : Dr. GARCÍA [FreeTextEntry3] : Alexey Cai MD\par Professor of Medicine\par Chief of GI\par Director IBD Program\par [facility]\par

## 2023-04-01 NOTE — ASSESSMENT
[FreeTextEntry1] : Pt is a 27 y/o male with previous Dx of UC diagnosed 1/2022, failed IFX s/p 3 loading doses with undetectable drug levels with antibodies, and recent IFX infusion reaction. Previously on VDZ (last dose 12/1/22) s/p colonoscopy 12/6/2022 with severe inflammation from rectum to mid transverse colon, ICV ulceration, and several TI aphthae leading to Dx of ileocolonic CD. Pathology neg for dysplasia. Pt started prednisone taper, now down to 25mg since 3/20, and began Rinvoq 5 weeks ago with improvement. Pt accompanied by Mom and grandmother today and took part in interview as per pt request. \par \par Since starting Rinvoq patient has had resolution of rectal bleeding, and near resolution of urgency, nocturnal BMs, pain and nausea. CRP normalized, however platelets and anemia worsened.\par \par # Ulcerative colitis - concern for Crohn's disease based on TI/ICV inflammation however after discussion at multidisciplinary conference with review of pathology, consensus that patient has Ulcerative Colitis\par - given his non-response to two biologics, severe disease, he started Rinvoq with 50% improvement\par - continue Rinvoq, prednisone taper; can cont steroid taper by 5mg every 2 weeks \par - We discussed the potential risks of upadacitinib (Rinvoq) treatment in detail with the patient. This included the increased risk of infections, which can be mitigated by checking for Tuberculosis and Hepatitis exposure and encouraging up to date immunizations with Shingrix, COVID-19, the annual flu, pneumococcal, and all other appropriate vaccine preventable illnesses prior to starting therapy. Live vaccines should be avoided while on therapy. We discussed the increased risk of blood clots and cardiovascular events especially in those over the age of 50, and to notify the provider with any signs/symptoms of DVT/PE including shortness of breath, chest pain, or unilateral extremity swelling, discoloration, or pain. The potential risk of cancers including but not limited to lymphoma and skin cancer, and the need for frequent lab monitoring, physical exams, and dermatological visits while on this treatment.  Given the increased risk of hyperlipidemia and lab abnormalities on treatment, we will be evaluating lab work at 1 month after treatment and intermittently thereafter. For women of childbearing age intending to conceive within 3 months or are planning to breastfeed, the patient understands this medication may be teratogenic and contraception or abstinence is required.  Any plans for pregnancy should be shared with ample time to discontinue the medication and find an alternative.  All the patient's questions and concerns were discussed in detail.\par - defer to pharmacy or PMD for Hep B booster\par - counselled on Vit D+calcium while on steroids\par - dexa after off steroids\par - pt confirms his psychiatrist aware of him using steroids and denies any significant mood changes on steroids\par - plan to be shared with pts PMD, Dr Toña Luz\par - referred to colorectal to discuss surgical options - saw Dr. Barrios 2 weeks ago\par - plan for CSCOPE at 12 weeks of treatment \par \par ?hx of low IgG1 vs lyme\par - following at [facility] infectious disease and planning for further w/u once off steroids\par \par #?Bipolar\par - Under the care of a psychiatrist but not currently on any medications.\par - We will plan to avoid TCA class of medications in him.\par - Pt demonstrates capacity but has given permission to involve mom in decision making\par \par Acne\par - likely 2/2 prednisone, referral to Derm \par \par BRIJESH \par - referral to Hematologist to consider IV iron given patient's severe fatigue\par - although \par \par F/U post-procedure

## 2023-04-04 LAB — CALPROTECTIN FECAL: 397 UG/G

## 2023-04-07 ENCOUNTER — APPOINTMENT (OUTPATIENT)
Dept: INFUSION THERAPY | Facility: CLINIC | Age: 28
End: 2023-04-07

## 2023-04-21 ENCOUNTER — APPOINTMENT (OUTPATIENT)
Dept: GASTROENTEROLOGY | Facility: CLINIC | Age: 28
End: 2023-04-21
Payer: MEDICAID

## 2023-04-21 PROCEDURE — 99213 OFFICE O/P EST LOW 20 MIN: CPT | Mod: 95

## 2023-04-24 NOTE — HISTORY OF PRESENT ILLNESS
[Home] : at home, [unfilled] , at the time of the visit. [Medical Office: ([facility])___] : at the medical office located in  [Verbal consent obtained from patient] : the patient, [unfilled] [FreeTextEntry1] : Pt is a 29 y/o male with previous Dx of UC diagnosed 1/2022, failed IFX s/p 3 loading doses with undetectable drug levels with antibodies, and recent IFX infusion reaction. Previously on VDZ (last dose 12/1/22) s/p colonoscopy 12/6/2022 with severe inflammation from rectum to mid transverse colon, ICV ulceration, and several TI aphthae leading to Dx of ileocolonic CD. Pathology neg for dysplasia. Pt currently on 5 mg prednisone and began Rinvoq 8 weeks ago with improvement. Pt accompanied by Mom  today and took part in interview as per pt request. \par \par Now reporting BMs down to 5-6 per day (50% improvement). Prednisone dose decreased with no worsening of symptoms. Nausea resolved. Less incontinence, can be able to leave the house a bit. Still tired/fatigued but that is better. Has iron infusion scheduled for tomorrow. No further rectal bleeding. Semi-formed stools. Pain nearly resolved.\par \par Concern for Crohn's disease based on recent CSCOPE showing TI and ICV inflammation however based on no abnormalities on pathology, patient will still be labeled as Ulcerative Colitis. Pt had appt with Dr. Barrios on 2/24 - and has been offered to come to the hospital several times to manage his pain, nausea and inflammation but has refused. \par \par Prior HPI:\par \par Pt is a 26 y/o male with tentative diagnosis of UC diagnosed 1/2022 presenting to [facility] care after insurance change, pt of Dr Soriano. Initial symptoms of urgency, rectal bleeding, and loose stool; colonoscopy 1/2022 with proctosigmoiditis and cecal erythema extending toward ascending colon and treated with PO budesonide and mesalamine suppository with control of symptoms. Pt had flare in 6/2022, was hospitalized at [facility], had CT with pancolitis, and treated with IV steroids with good response. He was discharged on PO prednisone and started on vedo in June. He is now s/p 1 maintenance dose of Vedo and has tapered to 15 mg with no recurrence of rectal bleeding but noticing incontinence of loose stools. 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 Also noted to have torn R acetabular labrum on MRI 6/2022 performed for R hip pain which has since resolved after steroids. Telemed today with his mom - pt began steroid taper with slight worsening of symptoms. \par \par Since going to 10mg prednisone he's noticed inc in rectal bleeding with each BM - mostly when wiping. + urgency, has not started fiber. Having 7-8 BMs per day. Good appetite. Still having nocturnal stools. No weight loss. No n/v. \par \par Mom has Hx of UC which is in remission; not currently being treated or hx of surgery.\par \par Denies smoking, alcohol, marijuana, or illicit drug use.

## 2023-04-24 NOTE — ASSESSMENT
[FreeTextEntry1] : Pt is a 29 y/o male with previous Dx of UC diagnosed 1/2022, failed IFX s/p 3 loading doses with undetectable drug levels with antibodies, and recent IFX infusion reaction. Previously on VDZ (last dose 12/1/22) s/p colonoscopy 12/6/2022 with severe inflammation from rectum to mid transverse colon, ICV ulceration, and several TI aphthae leading to Dx of ileocolonic CD. Pathology neg for dysplasia. Pt started prednisone taper, now down to 5mg since 3/20, and began Rinvoq 8 weeks ago with improvement. Pt accompanied by Mom today and took part in interview as per pt request. \par \par Since starting Rinvoq patient has had resolution of rectal bleeding, and near resolution of urgency, nocturnal BMs, pain and nausea. CRP normalized, however platelets and anemia worsened.\par \par # Ulcerative colitis - concern for Crohn's disease based on TI/ICV inflammation however after discussion at multidisciplinary conference with review of pathology, consensus that patient has Ulcerative Colitis\par - given his non-response to two biologics, severe disease, he started Rinvoq with 50% improvement\par - continue Rinvoq, prednisone taper\par - starting maintenance Rinvoq 30mg today\par - We discussed the potential risks of upadacitinib (Rinvoq) treatment in detail with the patient. This included the increased risk of infections, which can be mitigated by checking for Tuberculosis and Hepatitis exposure and encouraging up to date immunizations with Shingrix, COVID-19, the annual flu, pneumococcal, and all other appropriate vaccine preventable illnesses prior to starting therapy. Live vaccines should be avoided while on therapy. We discussed the increased risk of blood clots and cardiovascular events especially in those over the age of 50, and to notify the provider with any signs/symptoms of DVT/PE including shortness of breath, chest pain, or unilateral extremity swelling, discoloration, or pain. The potential risk of cancers including but not limited to lymphoma and skin cancer, and the need for frequent lab monitoring, physical exams, and dermatological visits while on this treatment.  Given the increased risk of hyperlipidemia and lab abnormalities on treatment, we will be evaluating lab work at 1 month after treatment and intermittently thereafter. For women of childbearing age intending to conceive within 3 months or are planning to breastfeed, the patient understands this medication may be teratogenic and contraception or abstinence is required.  Any plans for pregnancy should be shared with ample time to discontinue the medication and find an alternative.  All the patient's questions and concerns were discussed in detail.\par - defer to pharmacy or PMD for Hep B booster\par - counselled on Vit D+calcium while on steroids\par - dexa after off steroids\par - pt confirms his psychiatrist aware of him using steroids and denies any significant mood changes on steroids\par - plan to be shared with pts PMD, Dr Toña Luz\par - referred to colorectal to discuss surgical options - saw Dr. 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

## 2023-04-24 NOTE — CONSULT LETTER
[Dear  ___] : Dear  [unfilled], [Courtesy Letter:] : I had the pleasure of seeing your patient, [unfilled], in my office today. [Sincerely,] : Sincerely, [DrAngela  ___] : Dr. GARCÍA [FreeTextEntry3] : Ora Stratton NP

## 2023-05-08 ENCOUNTER — NON-APPOINTMENT (OUTPATIENT)
Age: 28
End: 2023-05-08

## 2023-05-17 ENCOUNTER — TRANSCRIPTION ENCOUNTER (OUTPATIENT)
Age: 28
End: 2023-05-17

## 2023-06-01 ENCOUNTER — TRANSCRIPTION ENCOUNTER (OUTPATIENT)
Age: 28
End: 2023-06-01

## 2023-06-01 ENCOUNTER — OUTPATIENT (OUTPATIENT)
Dept: OUTPATIENT SERVICES | Facility: HOSPITAL | Age: 28
LOS: 1 days | Discharge: HOME | End: 2023-06-01
Payer: MEDICAID

## 2023-06-01 ENCOUNTER — APPOINTMENT (OUTPATIENT)
Dept: GASTROENTEROLOGY | Facility: HOSPITAL | Age: 28
End: 2023-06-01

## 2023-06-01 ENCOUNTER — RESULT REVIEW (OUTPATIENT)
Age: 28
End: 2023-06-01

## 2023-06-01 PROCEDURE — 45380 COLONOSCOPY AND BIOPSY: CPT

## 2023-06-01 PROCEDURE — 88305 TISSUE EXAM BY PATHOLOGIST: CPT | Mod: 26

## 2023-06-01 PROCEDURE — 88305 TISSUE EXAM BY PATHOLOGIST: CPT

## 2023-06-05 LAB — SURGICAL PATHOLOGY STUDY: SIGNIFICANT CHANGE UP

## 2023-06-14 ENCOUNTER — APPOINTMENT (OUTPATIENT)
Dept: GASTROENTEROLOGY | Facility: CLINIC | Age: 28
End: 2023-06-14
Payer: MEDICAID

## 2023-06-14 VITALS
HEIGHT: 70 IN | OXYGEN SATURATION: 99 % | SYSTOLIC BLOOD PRESSURE: 132 MMHG | DIASTOLIC BLOOD PRESSURE: 75 MMHG | WEIGHT: 150 LBS | BODY MASS INDEX: 21.47 KG/M2 | HEART RATE: 89 BPM | TEMPERATURE: 97.3 F | RESPIRATION RATE: 14 BRPM

## 2023-06-14 PROCEDURE — 99214 OFFICE O/P EST MOD 30 MIN: CPT

## 2023-06-19 ENCOUNTER — RX RENEWAL (OUTPATIENT)
Age: 28
End: 2023-06-19

## 2023-06-22 NOTE — HISTORY OF PRESENT ILLNESS
[FreeTextEntry1] : Pt is a 29 y/o male with previous Dx of UC diagnosed 1/2022, failed IFX s/p 3 loading doses with undetectable drug levels with antibodies, and recent IFX infusion reaction. Previously on VDZ (last dose 12/1/22) s/p colonoscopy 12/6/2022 with severe inflammation from rectum to mid transverse colon, ICV ulceration, and several TI aphthae leading to Dx of ileocolonic CD. Pathology neg for dysplasia. Pt currently on 10 mg prednisone and Rinvoq maintenance. Pt accompanied by Mom today and took part in interview as per pt request. S/P CSCOPE. \par \par CSCOPE: \par Normal mucosa in the terminal ileum. \par  \par The distal colon from 0-20cm was consistent with proctosigmoiditis, Watt 2 \par disease with erythema, friability, loss of vascular pattern, and pseudopolyps. \par Biopsies were obtained. There was mild erythema from sigmoid to splenic flexture \par from 20-30cm, consistent with Watt 1 disease. The proximal colon appeared \par unaffected. Exam with evidence of endoscopic healing and interval improvement. \par PATH: \par Final Diagnosis\par 1.  Colon, left; biopsy:\par -   Colonic mucosa with acute and chronic inflammation, and mild\par architectural distortion.  See note.\par -   No dysplasia seen.\par \par Note: Sections reveal colonic mucosa with neutrophils in the lamina\par propria and in the crypt epithelium with crypt abscess, increased\par lymphoplasmacytic infiltration in the lamina propria, and mild\par architectural distortion.  Correlation with clinical findings is\par suggested.\par \par \par Now reporting BMs down to ~ 4 per day, no blood, no nocturnal symptoms. No abd pain. Prednisone dose decreased but then increased due to inc in blood and urgency. Now on 10mg prednisone. Nausea resolved. Less incontinence, can be able to leave the house a bit. Having "accidents" every 3 days on long walks. Still tired/fatigued but that is better. Underwent iron infusions with Heme. No further rectal bleeding. Semi-formed stools. \par \par Concern for Crohn's disease based on recent CSCOPE showing TI and ICV inflammation however based on no abnormalities on pathology, patient will still be labeled as Ulcerative Colitis. Pt had appt with Dr. Barrios on 2/24 - and has been offered to come to the hospital several times to manage his pain, nausea and inflammation but has refused. \par \par Prior HPI:\par \par Pt is a 28 y/o male with tentative diagnosis of UC diagnosed 1/2022 presenting to [facility] care after insurance change, pt of Dr Soriano. Initial symptoms of urgency, rectal bleeding, and loose stool; colonoscopy 1/2022 with proctosigmoiditis and cecal erythema extending toward ascending colon and treated with PO budesonide and mesalamine suppository with control of symptoms. Pt had flare in 6/2022, was hospitalized at [facility], had CT with pancolitis, and treated with IV steroids with good response. He was discharged on PO prednisone and started on vedo in June. He is now s/p 1 maintenance dose of Vedo and has tapered to 15 mg with no recurrence of rectal bleeding but noticing incontinence of loose stools. 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 Also noted to have torn R acetabular labrum on MRI 6/2022 performed for R hip pain which has since resolved after steroids. Telemed today with his mom - pt began steroid taper with slight worsening of symptoms. \par \par Since going to 10mg prednisone he's noticed inc in rectal bleeding with each BM - mostly when wiping. + urgency, has not started fiber. Having 7-8 BMs per day. Good appetite. Still having nocturnal stools. No weight loss. No n/v. \par \par Mom has Hx of UC which is in remission; not currently being treated or hx of surgery.\par \par Denies smoking, alcohol, marijuana, or illicit drug use.

## 2023-06-22 NOTE — CONSULT LETTER
[Dear  ___] : Dear  [unfilled], [Courtesy Letter:] : I had the pleasure of seeing your patient, [unfilled], in my office today. [Sincerely,] : Sincerely, [DrAngela  ___] : Dr. GARCÍA [FreeTextEntry3] : Ora Stratton NP \par \par Alexey Cai MD\par Professor of Medicine\par Chief of GI\par Director IBD Program\par [facility]\par

## 2023-06-22 NOTE — ASSESSMENT
[FreeTextEntry1] : Pt is a 27 y/o male with previous Dx of UC diagnosed 1/2022, failed IFX s/p 3 loading doses with undetectable drug levels with antibodies, and recent IFX infusion reaction. Previously on VDZ (last dose 12/1/22) s/p colonoscopy 12/6/2022 with severe inflammation from rectum to mid transverse colon, ICV ulceration, and several TI aphthae leading to Dx of ileocolonic CD. Pathology neg for dysplasia. Pt started prednisone taper, now down to 10mg since 3/20, and began Rinvoq ~ 4 mo ago with improvement. Pt accompanied by Mom today and took part in interview as per pt request. \par \par Since starting Rinvoq patient has had resolution of rectal bleeding, and near resolution of urgency, nocturnal BMs, pain and nausea. CRP normalized, however continues to have intermittent incontinence on long walks, still prednisone dependent, and rectosigmoid inflammation remains. \par \par # Ulcerative colitis - concern for Crohn's disease based on TI/ICV inflammation however after discussion at multidisciplinary conference with review of pathology, consensus that patient has Ulcerative Colitis\par - given his non-response to two biologics, severe disease, he started Rinvoq with 50% improvement - will administer 1 more month of loading at 45mg dose to help achieve mucosal healing given partial response both clinically and endoscopically; pt notices clear improvement and we would like to optimize his current strategy given his steroid dependence and prior medication failures \par - do not taper off of prednisone until he is in complete clinical remission; discussed alternating dosing 10/5 for 2 weeks and re-evaluating if can drop down to 5mg\par - We discussed the potential risks of upadacitinib (Rinvoq) treatment in detail with the patient. This included the increased risk of infections, which can be mitigated by checking for Tuberculosis and Hepatitis exposure and encouraging up to date immunizations with Shingrix, COVID-19, the annual flu, pneumococcal, and all other appropriate vaccine preventable illnesses prior to starting therapy. Live vaccines should be avoided while on therapy. We discussed the increased risk of blood clots and cardiovascular events especially in those over the age of 50, and to notify the provider with any signs/symptoms of DVT/PE including shortness of breath, chest pain, or unilateral extremity swelling, discoloration, or pain. The potential risk of cancers including but not limited to lymphoma and skin cancer, and the need for frequent lab monitoring, physical exams, and dermatological visits while on this treatment.  Given the increased risk of hyperlipidemia and lab abnormalities on treatment, we will be evaluating lab work at 1 month after treatment and intermittently thereafter. For women of childbearing age intending to conceive within 3 months or are planning to breastfeed, the patient understands this medication may be teratogenic and contraception or abstinence is required.  Any plans for pregnancy should be shared with ample time to discontinue the medication and find an alternative.  All the patient's questions and concerns were discussed in detail.\par - defer to pharmacy or PMD for Hep B booster\par - counselled on Vit D+calcium while on steroids\par - dexa after off steroids\par - pt confirms his psychiatrist aware of him using steroids and denies any significant mood changes on steroids; mom states mood swings are better on lower dosing\par - plan to be shared with pts PMD, Dr Toña Luz\par - referred to colorectal to discuss surgical options - saw Dr. 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 infectious disease and planning for further w/u once off steroids\par \par #?Bipolar\par - Under the care of a psychiatrist but not currently on any medications.\par - We will plan to avoid TCA class of medications in him.\par - Pt demonstrates capacity but has given permission to involve mom in decision making\par - now on Wellbutrin\par \par Acne\par - likely 2/2 prednisone, referral to Derm \par \par BRIJESH \par - referred to Hematologist for IV iron given patient's severe fatigue - 1st infusion today\par \par F/U post-procedure

## 2023-07-10 ENCOUNTER — APPOINTMENT (OUTPATIENT)
Dept: GASTROENTEROLOGY | Facility: CLINIC | Age: 28
End: 2023-07-10
Payer: MEDICAID

## 2023-07-10 PROCEDURE — 99214 OFFICE O/P EST MOD 30 MIN: CPT | Mod: 95

## 2023-07-14 NOTE — ASSESSMENT
[FreeTextEntry1] : Pt is a 29 y/o male with previous Dx of UC diagnosed 1/2022, failed IFX s/p 3 loading doses with undetectable drug levels with antibodies, and recent IFX infusion reaction. Previously on VDZ (last dose 12/1/22) s/p colonoscopy 12/6/2022 with severe inflammation from rectum to mid transverse colon, ICV ulceration, and several TI aphthae leading to Dx of ileocolonic CD. Pathology neg for dysplasia. Pt started prednisone taper, now down to 10mg since 3/20, and began Rinvoq ~ 4 mo ago with improvement. Pt accompanied by Mom today and took part in interview as per pt request. \par \par Since starting Rinvoq patient has had resolution of rectal bleeding, and near resolution of urgency, nocturnal BMs, pain and nausea. CRP normalized, however continues to have intermittent incontinence on long walks, still prednisone dependent, and rectosigmoid inflammation remains. \par \par # Ulcerative colitis - concern for Crohn's disease based on TI/ICV inflammation however after discussion at multidisciplinary conference with review of pathology, consensus that patient has Ulcerative Colitis\par - given his non-response to two biologics, severe disease, he started Rinvoq with 50% improvement - will administer 1 more month of loading at 45mg dose to help achieve mucosal healing given partial response both clinically and endoscopically; pt notices clear improvement and we would like to optimize his current strategy given his steroid dependence and prior medication failures \par - reports he was on Uceris foam twice daily for about 10 days but felt no improvement so he stopped \par - do not taper off of prednisone until he is in complete clinical remission; discussed alternating dosing 10/5 for 2 weeks and re-evaluating if can drop down to 5mg\par - will attempt 1 more month of 45mg Rinvoq to achieve near remission to taper off of prednisone - plan d/w Dr. Cai \par - We discussed the potential risks of upadacitinib (Rinvoq) treatment in detail with the patient. This included the increased risk of infections, which can be mitigated by checking for Tuberculosis and Hepatitis exposure and encouraging up to date immunizations with Shingrix, COVID-19, the annual flu, pneumococcal, and all other appropriate vaccine preventable illnesses prior to starting therapy. Live vaccines should be avoided while on therapy. We discussed the increased risk of blood clots and cardiovascular events especially in those over the age of 50, and to notify the provider with any signs/symptoms of DVT/PE including shortness of breath, chest pain, or unilateral extremity swelling, discoloration, or pain. The potential risk of cancers including but not limited to lymphoma and skin cancer, and the need for frequent lab monitoring, physical exams, and dermatological visits while on this treatment.  Given the increased risk of hyperlipidemia and lab abnormalities on treatment, we will be evaluating lab work at 1 month after treatment and intermittently thereafter. For women of childbearing age intending to conceive within 3 months or are planning to breastfeed, the patient understands this medication may be teratogenic and contraception or abstinence is required.  Any plans for pregnancy should be shared with ample time to discontinue the medication and find an alternative.  All the patient's questions and concerns were discussed in detail\par - referred to colorectal to discuss surgical options - saw Dr. 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

## 2023-07-14 NOTE — HISTORY OF PRESENT ILLNESS
[FreeTextEntry1] : Pt is a 29 y/o male with previous Dx of UC diagnosed 1/2022, failed IFX s/p 3 loading doses with undetectable drug levels with antibodies, and recent IFX infusion reaction. Previously on VDZ (last dose 12/1/22) s/p colonoscopy 12/6/2022 with severe inflammation from rectum to mid transverse colon, ICV ulceration, and several TI aphthae leading to Dx of ileocolonic CD. Pathology neg for dysplasia. Pt currently on 10 alternating 5 mg prednisone started July 2 and Rinvoq - he restarted the loading dose of 45mg after last visit due to not achieving deep remission. \par \par CSCOPE 6/1/23: \par Normal mucosa in the terminal ileum. \par  \par The distal colon from 0-20cm was consistent with proctosigmoiditis, Watt 2 \par disease with erythema, friability, loss of vascular pattern, and pseudopolyps. \par Biopsies were obtained. There was mild erythema from sigmoid to splenic flexture \par from 20-30cm, consistent with Watt 1 disease. The proximal colon appeared \par unaffected. Exam with evidence of endoscopic healing and interval improvement. \par PATH: \par Final Diagnosis\par 1.  Colon, left; biopsy:\par -   Colonic mucosa with acute and chronic inflammation, and mild\par architectural distortion.  See note.\par -   No dysplasia seen.\par \par Note: Sections reveal colonic mucosa with neutrophils in the lamina\par propria and in the crypt epithelium with crypt abscess, increased\par lymphoplasmacytic infiltration in the lamina propria, and mild\par architectural distortion.  Correlation with clinical findings is\par suggested.\par \par \par Now reporting BMs down to ~ 4 per day, no blood, no nocturnal symptoms. No abd pain. Prednisone dose decreased but then increased due to inc in blood and urgency. Now on 10mg prednisone. Nausea resolved. Less incontinence, can be able to leave the house a bit. Having "accidents" every 3 days on long walks. Still tired/fatigued but that is better. Underwent iron infusions with Heme. No further rectal bleeding. Semi-formed stools. \par \par Concern for Crohn's disease based on recent CSCOPE showing TI and ICV inflammation however based on no abnormalities on pathology, patient will still be labeled as Ulcerative Colitis. Pt had appt with Dr. Barrios on 2/24 - and has been offered to come to the hospital several times to manage his pain, nausea and inflammation but has refused. \par \par Prior HPI:\par \par Pt is a 26 y/o male with tentative diagnosis of UC diagnosed 1/2022 presenting to [facility]"" care after insurance change, pt of Dr Soriano. Initial symptoms of urgency, rectal bleeding, and loose stool; colonoscopy 1/2022 with proctosigmoiditis and cecal erythema extending toward ascending colon and treated with PO budesonide and mesalamine suppository with control of symptoms. Pt had flare in 6/2022, was hospitalized at [facility], had CT with pancolitis, and treated with IV steroids with good response. He was discharged on PO prednisone and started on vedo in June. He is now s/p 1 maintenance dose of Vedo and has tapered to 15 mg with no recurrence of rectal bleeding but noticing incontinence of loose stools. 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 Also noted to have torn R acetabular labrum on MRI 6/2022 performed for R hip pain which has since resolved after steroids. Telemed today with his mom - pt began steroid taper with slight worsening of symptoms. \par \par Since going to 10mg prednisone he's noticed inc in rectal bleeding with each BM - mostly when wiping. + urgency, has not started fiber. Having 7-8 BMs per day. Good appetite. Still having nocturnal stools. No weight loss. No n/v. \par \par Mom has Hx of UC which is in remission; not currently being treated or hx of surgery.\par \par Denies smoking, alcohol, marijuana, or illicit drug use.

## 2023-07-14 NOTE — CONSULT LETTER
[Dear  ___] : Dear  [unfilled], [Courtesy Letter:] : I had the pleasure of seeing your patient, [unfilled], in my office today. [Sincerely,] : Sincerely, [DrAngela  ___] : Dr. GARCÍA [FreeTextEntry3] : Ora Stratton NP \par \par Alexey Cai MD\par Professor of Medicine\par Chief of GI\par Director IBD Program\par [facility]\par

## 2023-07-19 ENCOUNTER — TRANSCRIPTION ENCOUNTER (OUTPATIENT)
Age: 28
End: 2023-07-19

## 2023-07-27 ENCOUNTER — TRANSCRIPTION ENCOUNTER (OUTPATIENT)
Age: 28
End: 2023-07-27

## 2023-07-28 LAB — CALPROTECTIN FECAL: 2240 UG/G

## 2023-08-16 ENCOUNTER — APPOINTMENT (OUTPATIENT)
Dept: GASTROENTEROLOGY | Facility: CLINIC | Age: 28
End: 2023-08-16
Payer: MEDICAID

## 2023-08-16 VITALS
RESPIRATION RATE: 16 BRPM | DIASTOLIC BLOOD PRESSURE: 78 MMHG | WEIGHT: 151 LBS | TEMPERATURE: 98.2 F | HEART RATE: 71 BPM | SYSTOLIC BLOOD PRESSURE: 126 MMHG | BODY MASS INDEX: 21.14 KG/M2 | HEIGHT: 71 IN | OXYGEN SATURATION: 97 %

## 2023-08-16 PROCEDURE — 99213 OFFICE O/P EST LOW 20 MIN: CPT

## 2023-08-16 RX ORDER — ONDANSETRON 4 MG/1
4 TABLET, ORALLY DISINTEGRATING ORAL
Qty: 1 | Refills: 0 | Status: COMPLETED | COMMUNITY
Start: 2023-01-31 | End: 2023-08-16

## 2023-08-16 RX ORDER — BUPROPION HYDROCHLORIDE 100 MG/1
TABLET, FILM COATED ORAL
Refills: 0 | Status: COMPLETED | COMMUNITY
End: 2023-08-16

## 2023-08-16 RX ORDER — CHLORHEXIDINE GLUCONATE 4 %
1000 LIQUID (ML) TOPICAL DAILY
Qty: 90 | Refills: 0 | Status: COMPLETED | COMMUNITY
Start: 2023-02-13 | End: 2023-08-16

## 2023-08-16 RX ORDER — MESALAMINE 1000 MG/1
1000 SUPPOSITORY RECTAL
Qty: 30 | Refills: 0 | Status: COMPLETED | COMMUNITY
Start: 2022-05-27 | End: 2023-08-16

## 2023-08-16 RX ORDER — BUDESONIDE 2 MG/1
2 AEROSOL, FOAM RECTAL
Qty: 2 | Refills: 3 | Status: COMPLETED | COMMUNITY
Start: 2023-06-01 | End: 2023-08-16

## 2023-08-16 RX ORDER — POTASSIUM CHLORIDE 750 MG/1
10 TABLET, FILM COATED, EXTENDED RELEASE ORAL TWICE DAILY
Qty: 6 | Refills: 0 | Status: COMPLETED | COMMUNITY
Start: 2023-02-13 | End: 2023-08-16

## 2023-08-18 NOTE — CONSULT LETTER
[Dear  ___] : Dear  [unfilled], [Courtesy Letter:] : I had the pleasure of seeing your patient, [unfilled], in my office today. [Sincerely,] : Sincerely, [DrAngela  ___] : Dr. GARCÍA [FreeTextEntry3] : Ora Stratton NP \par  \par  Alexey Cai MD\par  Professor of Medicine\par  Chief of GI\par  Director IBD Program\par  [facility]\par

## 2023-08-18 NOTE — HISTORY OF PRESENT ILLNESS
[FreeTextEntry1] : Pt is a 29 y/o male with previous Dx of UC diagnosed 1/2022, failed IFX s/p 3 loading doses with undetectable drug levels with antibodies, and IFX infusion reaction. Previously on VDZ (last dose 12/1/22) s/p colonoscopy 12/6/2022 with severe inflammation from rectum to mid transverse colon, ICV ulceration, and several TI aphthae leading to Dx of ileocolonic CD. Pathology neg for dysplasia. Pt currently 5 mg prednisone and Rinvoq - he restarted the loading dose of 45mg after last visit due to not achieving deep remission.   Now reporting near clinical remission - still having near incontinence after drinking coffee and going for a long walk. Otherwise feeling well.  Now reporting BMs down to ~ 2 per day, no blood, no nocturnal symptoms. No abd pain. Prednisone dose decreased to 5mg for the last month. Nausea resolved. Less incontinence, can be able to leave the house a bit. Has had an episode of incontinence about twice/week only if he drinks coffee. Still tired/fatigued but that is better. Underwent iron infusions with Heme. No further rectal bleeding. Semi-formed stools.   CSCOPE 6/1/23:  Normal mucosa in the terminal ileum.    The distal colon from 0-20cm was consistent with proctosigmoiditis, Watt 2  disease with erythema, friability, loss of vascular pattern, and pseudopolyps.  Biopsies were obtained. There was mild erythema from sigmoid to splenic flexture  from 20-30cm, consistent with Watt 1 disease. The proximal colon appeared  unaffected. Exam with evidence of endoscopic healing and interval improvement.  PATH:  Final Diagnosis 1.  Colon, left; biopsy: -   Colonic mucosa with acute and chronic inflammation, and mild architectural distortion.  See note. -   No dysplasia seen.  Note: Sections reveal colonic mucosa with neutrophils in the lamina propria and in the crypt epithelium with crypt abscess, increased lymphoplasmacytic infiltration in the lamina propria, and mild architectural distortion.  Correlation with clinical findings is suggested.    Concern for Crohn's disease based on recent CSCOPE showing TI and ICV inflammation however based on no abnormalities on pathology, patient will still be labeled as Ulcerative Colitis. Pt had appt with Dr. Barrios on 2/24 - and has been offered to come to the hospital several times to manage his pain, nausea and inflammation but has refused.   Prior HPI:  Pt is a 26 y/o male with tentative diagnosis of UC diagnosed 1/2022 presenting to [facility] care after insurance change, pt of Dr Soriano. Initial symptoms of urgency, rectal bleeding, and loose stool; colonoscopy 1/2022 with proctosigmoiditis and cecal erythema extending toward ascending colon and treated with PO budesonide and mesalamine suppository with control of symptoms. Pt had flare in 6/2022, was hospitalized at [facility], had CT with pancolitis, and treated with IV steroids with good response. He was discharged on PO prednisone and started on vedo in June. He is now s/p 1 maintenance dose of Vedo and has tapered to 15 mg with no recurrence of rectal bleeding but noticing incontinence of loose stools. 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 Also noted to have torn R acetabular labrum on MRI 6/2022 performed for R hip pain which has since resolved after steroids. Telemed today with his mom - pt began steroid taper with slight worsening of symptoms.   Since going to 10mg prednisone he's noticed inc in rectal bleeding with each BM - mostly when wiping. + urgency, has not started fiber. Having 7-8 BMs per day. Good appetite. Still having nocturnal stools. No weight loss. No n/v.   Mom has Hx of UC which is in remission; not currently being treated or hx of surgery.  Denies smoking, alcohol, marijuana, or illicit drug use.

## 2023-08-18 NOTE — ASSESSMENT
[FreeTextEntry1] : Pt is a 27 y/o male with previous Dx of UC diagnosed 1/2022, failed IFX s/p 3 loading doses with undetectable drug levels with antibodies, and IFX infusion reaction. Previously on VDZ (last dose 12/1/22) s/p colonoscopy 12/6/2022 with severe inflammation from rectum to mid transverse colon, ICV ulceration, and several TI aphthae leading to Dx of ileocolonic CD. Pathology neg for dysplasia. Pt started prednisone taper, now down to 5mg since 3/20, and began Rinvoq ~ 5 mo ago with improvement but then decline on maintenance dosing.   Since starting Rinvoq patient has had resolution of rectal bleeding, and near resolution of urgency, nocturnal BMs, pain and nausea. CRP normalized, however continues to have intermittent incontinence on long walks only if he has coffee, still prednisone dependent 5mg, and rectosigmoid inflammation remains.   # Ulcerative colitis - concern for Crohn's disease based on TI/ICV inflammation however after discussion at multidisciplinary conference with review of pathology, consensus that patient has Ulcerative Colitis - given his non-response to two biologics, severe disease, he started Rinvoq with 50% improvement - administered 1 more month of loading at 45mg dose to help achieve mucosal healing given partial response both clinically and endoscopically; pt notices clear improvement and we would like to optimize his current strategy given his steroid dependence and prior medication failures  - reports he was on Uceris foam twice daily for about 10 days but felt no improvement so he stopped  - do not taper off of prednisone until he is in complete clinical remission; cont 5 mg until next visit in 4 weeks  - transition to 30mg maintenance dose tomorrow  - referred to colorectal to discuss surgical options - saw Dr. Barrios - reviewed CSCOPE and plan in detail   HCM - defer to pharmacy or PMD for Hep B booster - counselled on Vit D+calcium while on steroids - dexa after off steroids - pt confirms his psychiatrist aware of him using steroids mom does report improvement in mood changes on steroids - plan to be shared with pts PMD, Dr Toña Luz  ?hx of low IgG1 vs lyme - following at [facility] infectious disease and planning for further w/u once off steroids  #?Bipolar - Under the care of a psychiatrist but not currently on any medications. - We will plan to avoid TCA class of medications in him. - Pt demonstrates capacity but has given permission to involve mom in decision making - now on Wellbutrin  Acne - likely 2/2 prednisone, referral to Derm   BRIJESH  - referred to Hematologist for IV iron given patient's severe fatigue - has received infusions with no ADRs  F/U 4 weeks

## 2023-09-04 NOTE — PROGRESS NOTE ADULT - SUBJECTIVE AND OBJECTIVE BOX
Discharge instructions were given to the patient by [facility]. The patient left the Emergency Department ambulatory, alert and oriented and in no acute distress with 0 prescriptions. The patient was encouraged to call or return to the ED for worsening issues or problems and was encouraged to schedule a follow up appointment for continuing care. The patient verbalized understanding of discharge instructions and prescriptions, all questions were answered. The patient has no further concerns at this time.         Belén Nunn RN  09/04/23 1017 **INCOMPLETE NOTE    OVERNIGHT EVENTS: No acute events.    SUBJECTIVE:  Patient seen and examined at bedside. denies abdominal pain.  Several episodes of bloody diarrhea overnight. Endorsing continued R gluteal pain that is 3/10 at rest. 9/10 with weight bearing.  Denies n/v, fever/chills, numbness/tingling in legs.       Vital Signs Last 12 Hrs  T(F): 98.4 (06-13-22 @ 05:26), Max: 98.4 (06-13-22 @ 05:26)  HR: 76 (06-13-22 @ 05:26) (68 - 76)  BP: 105/61 (06-13-22 @ 05:26) (100/62 - 105/61)  BP(mean): --  RR: 16 (06-13-22 @ 05:26) (16 - 18)  SpO2: 98% (06-13-22 @ 05:26) (97% - 98%)  I&O's Summary    12 Jun 2022 07:01  -  13 Jun 2022 07:00  --------------------------------------------------------  IN: 0 mL / OUT: 200 mL / NET: -200 mL        PHYSICAL EXAM:  Constitutional: NAD, comfortable in bed.  HEENT: NC/AT, PERRLA, EOMI, no conjunctival pallor or scleral icterus, MMM  Neck: Supple, no JVD  Respiratory: CTA B/L. No w/r/r.   Cardiovascular: RRR, normal S1 and S2, no m/r/g.   Gastrointestinal: +BS, soft NTND, no guarding or rebound tenderness, no palpable masses   Extremities: wwp; no cyanosis, clubbing or edema.   Vascular: Pulses equal and strong throughout.   Neurological: AAOx3, no CN deficits, strength and sensation intact throughout.   Skin: No gross skin abnormalities or rashes        LABS:                        10.5   11.21 )-----------( 391      ( 13 Jun 2022 06:52 )             33.0     06-13    142  |  104  |  8   ----------------------------<  133<H>  4.3   |  27  |  0.77    Ca    9.5      13 Jun 2022 06:52  Phos  4.5     06-13  Mg     1.7     06-13    TPro  6.5  /  Alb  3.5  /  TBili  0.3  /  DBili  x   /  AST  13  /  ALT  18  /  AlkPhos  40  06-13    PT/INR - ( 11 Jun 2022 17:07 )   PT: 13.5 sec;   INR: 1.13          PTT - ( 11 Jun 2022 17:07 )  PTT:28.5 sec  Urinalysis Basic - ( 11 Jun 2022 17:18 )    Color: Yellow / Appearance: Clear / SG: >=1.030 / pH: x  Gluc: x / Ketone: NEGATIVE  / Bili: Negative / Urobili: 0.2 E.U./dL   Blood: x / Protein: NEGATIVE mg/dL / Nitrite: NEGATIVE   Leuk Esterase: NEGATIVE / RBC: x / WBC x   Sq Epi: x / Non Sq Epi: x / Bacteria: x          RADIOLOGY & ADDITIONAL TESTS:    MEDICATIONS  (STANDING):  calcium carbonate   1250 mG (OsCal) 1 Tablet(s) Oral daily  cholecalciferol 1000 Unit(s) Oral daily  iron sucrose IVPB 300 milliGRAM(s) IV Intermittent every 24 hours  methylPREDNISolone sodium succinate Injectable 20 milliGRAM(s) IV Push every 8 hours  pantoprazole    Tablet 40 milliGRAM(s) Oral before breakfast    MEDICATIONS  (PRN):  acetaminophen     Tablet .. 650 milliGRAM(s) Oral every 6 hours PRN Mild Pain (1 - 3)   **INCOMPLETE NOTE    OVERNIGHT EVENTS: JOSE LUIS    SUBJECTIVE:  Patient seen and examined at bedside. Denies abdominal pain.  Several episodes of bloody diarrhea overnight. Endorsing continued R gluteal pain that is 3/10 at rest and 9/10 with weight bearing.  Denies n/v, fever/chills, numbness/tingling in legs.       Vital Signs Last 12 Hrs  T(F): 98.4 (06-13-22 @ 05:26), Max: 98.4 (06-13-22 @ 05:26)  HR: 76 (06-13-22 @ 05:26) (68 - 76)  BP: 105/61 (06-13-22 @ 05:26) (100/62 - 105/61)  BP(mean): --  RR: 16 (06-13-22 @ 05:26) (16 - 18)  SpO2: 98% (06-13-22 @ 05:26) (97% - 98%)  I&O's Summary    12 Jun 2022 07:01  -  13 Jun 2022 07:00  --------------------------------------------------------  IN: 0 mL / OUT: 200 mL / NET: -200 mL        PHYSICAL EXAM:  Constitutional: NAD, comfortable in bed.  HEENT: NC/AT, PERRLA, EOMI, no conjunctival pallor or scleral icterus, MMM  Neck: Supple, no JVD  Respiratory: CTA B/L. No w/r/r.   Cardiovascular: RRR, normal S1 and S2, no m/r/g.   Gastrointestinal: +BS, soft NTND, no guarding or rebound tenderness, no palpable masses   Extremities: wwp; no cyanosis, clubbing or edema.   MSK: right hip/gluteal pain with internal rotation, no deformities.  Vascular: Pulses equal and strong throughout.   Neurological: AAOx3, no CN deficits, strength and sensation intact throughout.   Skin: No gross skin abnormalities or rashes        LABS:                        10.5   11.21 )-----------( 391      ( 13 Jun 2022 06:52 )             33.0     06-13    142  |  104  |  8   ----------------------------<  133<H>  4.3   |  27  |  0.77    Ca    9.5      13 Jun 2022 06:52  Phos  4.5     06-13  Mg     1.7     06-13    TPro  6.5  /  Alb  3.5  /  TBili  0.3  /  DBili  x   /  AST  13  /  ALT  18  /  AlkPhos  40  06-13    PT/INR - ( 11 Jun 2022 17:07 )   PT: 13.5 sec;   INR: 1.13          PTT - ( 11 Jun 2022 17:07 )  PTT:28.5 sec  Urinalysis Basic - ( 11 Jun 2022 17:18 )    Color: Yellow / Appearance: Clear / SG: >=1.030 / pH: x  Gluc: x / Ketone: NEGATIVE  / Bili: Negative / Urobili: 0.2 E.U./dL   Blood: x / Protein: NEGATIVE mg/dL / Nitrite: NEGATIVE   Leuk Esterase: NEGATIVE / RBC: x / WBC x   Sq Epi: x / Non Sq Epi: x / Bacteria: x          RADIOLOGY & ADDITIONAL TESTS:    MEDICATIONS  (STANDING):  calcium carbonate   1250 mG (OsCal) 1 Tablet(s) Oral daily  cholecalciferol 1000 Unit(s) Oral daily  iron sucrose IVPB 300 milliGRAM(s) IV Intermittent every 24 hours  methylPREDNISolone sodium succinate Injectable 20 milliGRAM(s) IV Push every 8 hours  pantoprazole    Tablet 40 milliGRAM(s) Oral before breakfast    MEDICATIONS  (PRN):  acetaminophen     Tablet .. 650 milliGRAM(s) Oral every 6 hours PRN Mild Pain (1 - 3)       OVERNIGHT EVENTS: JOSE LUIS    SUBJECTIVE:  Patient seen and examined at bedside. Denies abdominal pain.  Several episodes of bloody diarrhea overnight. Endorsing continued R gluteal pain that is 3/10 at rest and 9/10 with weight bearing.  Denies n/v, fever/chills, numbness/tingling in legs.       Vital Signs Last 12 Hrs  T(F): 98.4 (06-13-22 @ 05:26), Max: 98.4 (06-13-22 @ 05:26)  HR: 76 (06-13-22 @ 05:26) (68 - 76)  BP: 105/61 (06-13-22 @ 05:26) (100/62 - 105/61)  BP(mean): --  RR: 16 (06-13-22 @ 05:26) (16 - 18)  SpO2: 98% (06-13-22 @ 05:26) (97% - 98%)  I&O's Summary    12 Jun 2022 07:01  -  13 Jun 2022 07:00  --------------------------------------------------------  IN: 0 mL / OUT: 200 mL / NET: -200 mL        PHYSICAL EXAM:  Constitutional: NAD, comfortable in bed.  HEENT: NC/AT, PERRLA, EOMI, no conjunctival pallor or scleral icterus, MMM  Neck: Supple, no JVD  Respiratory: CTA B/L. No w/r/r.   Cardiovascular: RRR, normal S1 and S2, no m/r/g.   Gastrointestinal: +BS, soft NTND, no guarding or rebound tenderness, no palpable masses   Extremities: wwp; no cyanosis, clubbing or edema.   MSK: right hip/gluteal pain with internal rotation, no deformities.  Vascular: Pulses equal and strong throughout.   Neurological: AAOx3, no CN deficits, strength and sensation intact throughout.   Skin: No gross skin abnormalities or rashes        LABS:                        10.5   11.21 )-----------( 391      ( 13 Jun 2022 06:52 )             33.0     06-13    142  |  104  |  8   ----------------------------<  133<H>  4.3   |  27  |  0.77    Ca    9.5      13 Jun 2022 06:52  Phos  4.5     06-13  Mg     1.7     06-13    TPro  6.5  /  Alb  3.5  /  TBili  0.3  /  DBili  x   /  AST  13  /  ALT  18  /  AlkPhos  40  06-13    PT/INR - ( 11 Jun 2022 17:07 )   PT: 13.5 sec;   INR: 1.13          PTT - ( 11 Jun 2022 17:07 )  PTT:28.5 sec  Urinalysis Basic - ( 11 Jun 2022 17:18 )    Color: Yellow / Appearance: Clear / SG: >=1.030 / pH: x  Gluc: x / Ketone: NEGATIVE  / Bili: Negative / Urobili: 0.2 E.U./dL   Blood: x / Protein: NEGATIVE mg/dL / Nitrite: NEGATIVE   Leuk Esterase: NEGATIVE / RBC: x / WBC x   Sq Epi: x / Non Sq Epi: x / Bacteria: x          RADIOLOGY & ADDITIONAL TESTS:    MEDICATIONS  (STANDING):  calcium carbonate   1250 mG (OsCal) 1 Tablet(s) Oral daily  cholecalciferol 1000 Unit(s) Oral daily  iron sucrose IVPB 300 milliGRAM(s) IV Intermittent every 24 hours  methylPREDNISolone sodium succinate Injectable 20 milliGRAM(s) IV Push every 8 hours  pantoprazole    Tablet 40 milliGRAM(s) Oral before breakfast    MEDICATIONS  (PRN):  acetaminophen     Tablet .. 650 milliGRAM(s) Oral every 6 hours PRN Mild Pain (1 - 3)

## 2023-09-08 ENCOUNTER — TRANSCRIPTION ENCOUNTER (OUTPATIENT)
Age: 28
End: 2023-09-08

## 2023-09-12 ENCOUNTER — APPOINTMENT (OUTPATIENT)
Dept: GASTROENTEROLOGY | Facility: CLINIC | Age: 28
End: 2023-09-12

## 2023-09-13 LAB
ALBUMIN SERPL ELPH-MCNC: 4.8 G/DL
ALP BLD-CCNC: 62 U/L
ALT SERPL-CCNC: 25 U/L
ANION GAP SERPL CALC-SCNC: 12 MMOL/L
AST SERPL-CCNC: 19 U/L
BASOPHILS # BLD AUTO: 0.03 K/UL
BASOPHILS NFR BLD AUTO: 0.5 %
BILIRUB SERPL-MCNC: 0.6 MG/DL
BUN SERPL-MCNC: 13 MG/DL
CALCIUM SERPL-MCNC: 9.4 MG/DL
CHLORIDE SERPL-SCNC: 105 MMOL/L
CHOLEST SERPL-MCNC: 156 MG/DL
CO2 SERPL-SCNC: 25 MMOL/L
CREAT SERPL-MCNC: 0.9 MG/DL
CRP SERPL-MCNC: <3 MG/L
EGFR: 120 ML/MIN/1.73M2
EOSINOPHIL # BLD AUTO: 0.1 K/UL
EOSINOPHIL NFR BLD AUTO: 1.6 %
GLUCOSE SERPL-MCNC: 85 MG/DL
HCT VFR BLD CALC: 49.5 %
HDLC SERPL-MCNC: 82 MG/DL
HGB BLD-MCNC: 15.5 G/DL
IMM GRANULOCYTES NFR BLD AUTO: 1 %
LDLC SERPL CALC-MCNC: 65 MG/DL
LYMPHOCYTES # BLD AUTO: 2.18 K/UL
LYMPHOCYTES NFR BLD AUTO: 35.8 %
MAN DIFF?: NORMAL
MCHC RBC-ENTMCNC: 28.3 PG
MCHC RBC-ENTMCNC: 31.3 GM/DL
MCV RBC AUTO: 90.5 FL
MONOCYTES # BLD AUTO: 0.48 K/UL
MONOCYTES NFR BLD AUTO: 7.9 %
NEUTROPHILS # BLD AUTO: 3.24 K/UL
NEUTROPHILS NFR BLD AUTO: 53.2 %
NONHDLC SERPL-MCNC: 74 MG/DL
PLATELET # BLD AUTO: 257 K/UL
POTASSIUM SERPL-SCNC: 4.4 MMOL/L
PROT SERPL-MCNC: 7.1 G/DL
RBC # BLD: 5.47 M/UL
RBC # FLD: 13.2 %
SODIUM SERPL-SCNC: 142 MMOL/L
TRIGL SERPL-MCNC: 40 MG/DL
WBC # FLD AUTO: 6.09 K/UL

## 2023-09-14 ENCOUNTER — APPOINTMENT (OUTPATIENT)
Dept: GASTROENTEROLOGY | Facility: CLINIC | Age: 28
End: 2023-09-14
Payer: MEDICAID

## 2023-09-14 PROCEDURE — 99212 OFFICE O/P EST SF 10 MIN: CPT

## 2023-10-05 ENCOUNTER — APPOINTMENT (OUTPATIENT)
Dept: GASTROENTEROLOGY | Facility: CLINIC | Age: 28
End: 2023-10-05
Payer: MEDICAID

## 2023-10-05 VITALS
TEMPERATURE: 97.6 F | WEIGHT: 150 LBS | SYSTOLIC BLOOD PRESSURE: 123 MMHG | RESPIRATION RATE: 16 BRPM | OXYGEN SATURATION: 98 % | HEIGHT: 71 IN | DIASTOLIC BLOOD PRESSURE: 70 MMHG | BODY MASS INDEX: 21 KG/M2 | HEART RATE: 80 BPM

## 2023-10-05 DIAGNOSIS — Z86.39 PERSONAL HISTORY OF OTHER ENDOCRINE, NUTRITIONAL AND METABOLIC DISEASE: ICD-10-CM

## 2023-10-05 PROCEDURE — 99499A: CUSTOM | Mod: NC

## 2023-10-05 RX ORDER — ONDANSETRON 4 MG/1
4 TABLET, ORALLY DISINTEGRATING ORAL
Qty: 1 | Refills: 0 | Status: DISCONTINUED | COMMUNITY
Start: 2023-09-29 | End: 2023-10-05

## 2023-10-19 ENCOUNTER — NON-APPOINTMENT (OUTPATIENT)
Age: 28
End: 2023-10-19

## 2023-10-19 ENCOUNTER — APPOINTMENT (OUTPATIENT)
Dept: PEDIATRIC GASTROENTEROLOGY | Facility: CLINIC | Age: 28
End: 2023-10-19

## 2023-10-19 ENCOUNTER — TRANSCRIPTION ENCOUNTER (OUTPATIENT)
Age: 28
End: 2023-10-19

## 2023-10-19 VITALS
SYSTOLIC BLOOD PRESSURE: 113 MMHG | OXYGEN SATURATION: 96 % | WEIGHT: 147.05 LBS | BODY MASS INDEX: 20.59 KG/M2 | HEIGHT: 70.71 IN | RESPIRATION RATE: 16 BRPM | DIASTOLIC BLOOD PRESSURE: 77 MMHG | HEART RATE: 92 BPM

## 2023-10-23 ENCOUNTER — APPOINTMENT (OUTPATIENT)
Dept: INTERNAL MEDICINE | Facility: CLINIC | Age: 28
End: 2023-10-23

## 2023-10-27 ENCOUNTER — TRANSCRIPTION ENCOUNTER (OUTPATIENT)
Age: 28
End: 2023-10-27

## 2023-10-27 ENCOUNTER — APPOINTMENT (OUTPATIENT)
Dept: INTERNAL MEDICINE | Facility: CLINIC | Age: 28
End: 2023-10-27

## 2023-11-03 ENCOUNTER — TRANSCRIPTION ENCOUNTER (OUTPATIENT)
Age: 28
End: 2023-11-03

## 2023-11-03 ENCOUNTER — APPOINTMENT (OUTPATIENT)
Dept: INTERNAL MEDICINE | Facility: CLINIC | Age: 28
End: 2023-11-03

## 2023-11-10 ENCOUNTER — APPOINTMENT (OUTPATIENT)
Dept: INTERNAL MEDICINE | Facility: CLINIC | Age: 28
End: 2023-11-10

## 2023-11-15 ENCOUNTER — TRANSCRIPTION ENCOUNTER (OUTPATIENT)
Age: 28
End: 2023-11-15

## 2023-11-17 ENCOUNTER — APPOINTMENT (OUTPATIENT)
Dept: INTERNAL MEDICINE | Facility: CLINIC | Age: 28
End: 2023-11-17

## 2023-11-17 ENCOUNTER — TRANSCRIPTION ENCOUNTER (OUTPATIENT)
Age: 28
End: 2023-11-17

## 2023-11-27 ENCOUNTER — APPOINTMENT (OUTPATIENT)
Dept: INTERNAL MEDICINE | Facility: CLINIC | Age: 28
End: 2023-11-27

## 2023-12-20 ENCOUNTER — APPOINTMENT (OUTPATIENT)
Dept: GASTROENTEROLOGY | Facility: CLINIC | Age: 28
End: 2023-12-20
Payer: MEDICAID

## 2023-12-20 VITALS
TEMPERATURE: 98 F | SYSTOLIC BLOOD PRESSURE: 115 MMHG | WEIGHT: 152 LBS | RESPIRATION RATE: 17 BRPM | HEART RATE: 102 BPM | BODY MASS INDEX: 21.76 KG/M2 | HEIGHT: 70 IN | DIASTOLIC BLOOD PRESSURE: 78 MMHG | OXYGEN SATURATION: 99 %

## 2023-12-20 PROCEDURE — 99215 OFFICE O/P EST HI 40 MIN: CPT

## 2023-12-20 NOTE — PHYSICAL EXAM
[Alert] : alert [Healthy Appearing] : healthy appearing [No Acute Distress] : no acute distress [Sclera] : the sclera and conjunctiva were normal [Normal Lips/Gums] : the lips and gums were normal [Normal] : the appearance was normal, no neck mass [No Respiratory Distress] : no respiratory distress [Respiration, Rhythm And Depth] : normal respiratory rhythm and effort [Auscultation Breath Sounds / Voice Sounds] : lungs were clear to auscultation bilaterally [Heart Rate And Rhythm] : heart rate was normal and rhythm regular [Normal S1, S2] : normal S1 and S2 [Bowel Sounds] : normal bowel sounds [Abdomen Tenderness] : non-tender [Abnormal Walk] : normal gait [Abdomen Soft] : soft [Normal Color / Pigmentation] : normal skin color and pigmentation [] : no rash [No Focal Deficits] : no focal deficits [Oriented To Time, Place, And Person] : oriented to person, place, and time [Normal Affect] : the affect was normal [Normal Mood] : the mood was normal

## 2023-12-21 LAB
25(OH)D3 SERPL-MCNC: 25.8 NG/ML
ALBUMIN SERPL ELPH-MCNC: 4.7 G/DL
ALP BLD-CCNC: 63 U/L
ALT SERPL-CCNC: 30 U/L
ANION GAP SERPL CALC-SCNC: 12 MMOL/L
AST SERPL-CCNC: 21 U/L
BASOPHILS # BLD AUTO: 0.03 K/UL
BASOPHILS NFR BLD AUTO: 0.3 %
BILIRUB SERPL-MCNC: 0.3 MG/DL
BUN SERPL-MCNC: 13 MG/DL
CALCIUM SERPL-MCNC: 9.4 MG/DL
CHLORIDE SERPL-SCNC: 100 MMOL/L
CO2 SERPL-SCNC: 28 MMOL/L
CREAT SERPL-MCNC: 0.88 MG/DL
EGFR: 121 ML/MIN/1.73M2
EOSINOPHIL # BLD AUTO: 0.11 K/UL
EOSINOPHIL NFR BLD AUTO: 1.3 %
FOLATE SERPL-MCNC: 7.4 NG/ML
GLUCOSE SERPL-MCNC: 86 MG/DL
HCT VFR BLD CALC: 47.3 %
HGB BLD-MCNC: 15.3 G/DL
IMM GRANULOCYTES NFR BLD AUTO: 0.6 %
LYMPHOCYTES # BLD AUTO: 2.13 K/UL
LYMPHOCYTES NFR BLD AUTO: 24.3 %
MAN DIFF?: NORMAL
MCHC RBC-ENTMCNC: 28.8 PG
MCHC RBC-ENTMCNC: 32.3 GM/DL
MCV RBC AUTO: 88.9 FL
MONOCYTES # BLD AUTO: 0.86 K/UL
MONOCYTES NFR BLD AUTO: 9.8 %
NEUTROPHILS # BLD AUTO: 5.6 K/UL
NEUTROPHILS NFR BLD AUTO: 63.7 %
PHOSPHATE SERPL-MCNC: 3.6 MG/DL
PLATELET # BLD AUTO: 306 K/UL
POTASSIUM SERPL-SCNC: 4.3 MMOL/L
PROT SERPL-MCNC: 7.4 G/DL
RBC # BLD: 5.32 M/UL
RBC # FLD: 11.9 %
SODIUM SERPL-SCNC: 140 MMOL/L
VIT B12 SERPL-MCNC: 412 PG/ML
WBC # FLD AUTO: 8.78 K/UL

## 2023-12-22 ENCOUNTER — TRANSCRIPTION ENCOUNTER (OUTPATIENT)
Age: 28
End: 2023-12-22

## 2023-12-22 NOTE — HISTORY OF PRESENT ILLNESS
[FreeTextEntry1] : The patient is a 29 y/o MALE w/ history of left-sided ulcerative colitis (diagnosed in 01/2022) w/ treatment failures to both Infliximab and Vedolizumab who was transitioned to Rinvoq in February/March 2023 who presents today for a follow-up visit. His last colonoscope was in June 2023 w/ findings: Normal mucosa in the terminal ileum. 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 Biopsies were obtained. There was mild erythema from sigmoid to splenic flexture from 20-30cm, consistent with Watt 1 disease. The proximal colon appeared unaffected. Exam with evidence of endoscopic healing and interval improvement. Plan: Advance diet as tolerated Continue current medical regimen, with addition of rectal steroid foam Discharge to home Patient will call office if any worrisome complaints, anticipatory guidance discussed Continue current medical regimen. Pathology findings: 1.  Colon, left; biopsy: -   Colonic mucosa with acute and chronic inflammation, and mild architectural distortion.  See note. - No dysplasia seen. Note: Sections reveal colonic mucosa with neutrophils in the lamina propria and in the crypt epithelium with crypt abscess, increased lymphoplasmacytic infiltration in the lamina propria, and mild architectural distortion.  Correlation with clinical findings is suggested.  The patient was seen and examined in the office today. He states that he is feeling better compared to when he was on Infliximab and Vedolizumab. He states that at that time he was having frequent central abdominal pain associated w/ bloody diarrhea (up to 8-10 episodes per day). When he was transitioned to Rinvoq as well as a higher dose Prednisone taper in February/March 2023, he stated that that was the best he felt. His abdominal pain was minimal and infrequent abd bowel movements were reduced to 4 per day and were more formed. Blood was seen intermittently but not reliably with each bowel movement. Today, he states that he feels similar. He reports 4-6 bowel movements per day. They are all generally formed. They are not mucoid. He notices blood when wiping at times but does not see blood with the bowel movements. He denies dysphagia, odynophagia, nausea, vomiting, coffee-ground emesis, hematemesis, constipation, melena, and unintentional weight loss.  ----------------------------------------------------------------------------------------------- Subject's initials:  Subject # [id number]  Patient presents today for screening visit for [facility] for UC Device Trial.  Patient has a history of Ulcerative Colitis, currently on Rinvoq, previously exposed to IFX,VEDO, not in clinical remission despite 5mg prednisone daily.  Subject # IBD- met all the inclusion criteria and did not meet any exclusion criteria for the [facility]AN for UC Device Trial and was enrolled on 10/5/23. The study was explained; the subject had the opportunity to ask questions, and was given a signed copy of the consent form. Consent was obtained prior to the start of any study procedures.  Interval hx: Was reporting near clinical remission - however now reporting BMs ~ 2-4 per day, some blood, no nocturnal symptoms. No abd pain. Prednisone dose decreased to 5mg for the last month. Nausea resolved. Less incontinence, can be able to leave the house a bit. Has had an episode of incontinence about twice/week only if he drinks coffee. Still tired/fatigued but that is better. Underwent iron infusions with Heme. Semi-formed stools.  CSCOPE 6/1/23: Normal mucosa in the terminal ileum.  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 Biopsies were obtained. There was mild erythema from sigmoid to splenic flexture from 20-30cm, consistent with Watt 1 disease. The proximal colon appeared unaffected. Exam with evidence of endoscopic healing and interval improvement. PATH: Final Diagnosis 1. Colon, left; biopsy: - Colonic mucosa with acute and chronic inflammation, and mild architectural distortion. See note. - No dysplasia seen.  Note: Sections reveal colonic mucosa with neutrophils in the lamina propria and in the crypt epithelium with crypt abscess, increased lymphoplasmacytic infiltration in the lamina propria, and mild architectural distortion. Correlation with clinical findings is suggested.    Concern for Crohn's disease based on recent CSCOPE showing TI and ICV inflammation however based on no abnormalities on pathology, patient will still be labeled as Ulcerative Colitis. Pt had appt with Dr. Barrios on 2/24 - and has been offered to come to the hospital several times to manage his pain, nausea and inflammation but has refused.  Prior HPI:  Pt is a 26 y/o male with tentative diagnosis of UC diagnosed 1/2022 presenting to [facility] care after insurance change, pt of Dr Soriano. Initial symptoms of urgency, rectal bleeding, and loose stool; colonoscopy 1/2022 with proctosigmoiditis and cecal erythema extending toward ascending colon and treated with PO budesonide and mesalamine suppository with control of symptoms. Pt had flare in 6/2022, was hospitalized at [facility], had CT with pancolitis, and treated with IV steroids with good response. He was discharged on PO prednisone and started on vedo in June. He is now s/p 1 maintenance dose of Vedo and has tapered to 15 mg with no recurrence of rectal bleeding but noticing incontinence of loose stools. 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 Also noted to have torn R acetabular labrum on MRI 6/2022 performed for R hip pain which has since resolved after steroids. Telemed today with his mom - pt began steroid taper with slight worsening of symptoms.  Since going to 10mg prednisone he's noticed inc in rectal bleeding with each BM - mostly when wiping. + urgency, has not started fiber. Having 7-8 BMs per day. Good appetite. Still having nocturnal stools. No weight loss. No n/v.  Mom has Hx of UC which is in remission; not currently being treated or hx of surgery.  Denies smoking, alcohol, marijuana, or illicit drug use.

## 2023-12-22 NOTE — ASSESSMENT
[FreeTextEntry1] : The patient is a 29 y/o MALE w/ history of left-sided ulcerative colitis (diagnosed in 01/2022) w/ treatment failures to both Infliximab and Vedolizumab who was transitioned to Rinvoq in February/March 2023 who presents today for a follow-up visit. His colonoscopy showed active disease, however, was clinically improved compared to prior. Vital signs and physical exam are unremarkable. Lab values are significant for normal Hgb, Plts, ESR/CRP. His neurologist at [facility] ordered an extensive panel for workup of his encephalitis/CIDP? and the only positive tests were pANCA and HBV core Ab positivity. He has exhibited clinical response to Rinvoq but is not currently in clinical remission.   #Left sided ulcerative colitis (moderate) w/ treatment failures to Infliximab/Vedolizumab currently exhibiting clinical response to Rinvoq.  Has had more sx as steroids have tapered.  - Symptomology and colonoscopy results as above in HPI - C/w Rinvoq + Prednisone 5 mg - OTC Vitamin D/Calcium supplementation - Serum and stool testing - For consideration of Omvoh initiation; patient to research drug and call to schedule follow-up; given his prior clinical failures on therapy, we would look to add this medication to current therapy.  Explained this is beyond the level of guidelines for standard therapy and only case report data to combine sm + biologic, and he understands. - The patient may have to drop out of VNS device trial (currently does not report any benefit; also reports technical difficulties) if there are changes made to his medications in the future  #HBV Core Ab positivity - Normal LFTs - Obtain HAV IgG, HBsAg, HBsAb, HBV DNA PCR, HCV Ab at next visit prior to possible initiation of Omvoh  Patient to research drug (Omvoh) and call to schedule follow-up for possible initiation

## 2023-12-22 NOTE — REVIEW OF SYSTEMS
[Negative] : Heme/Lymph [Abdominal Pain] : abdominal pain [Bleeding] : bleeding [Fever] : no fever [Chills] : no chills [Feeling Tired] : not feeling tired [Eye Pain] : no eye pain [Red Eyes] : eyes not red [Sore Throat] : no sore throat [Chest Pain] : no chest pain [Palpitations] : no palpitations [Lower Ext Edema (lower leg swelling)] : no lower extremity edema [Shortness Of Breath] : no shortness of breath [Cough] : no cough [SOB on Exertion] : no shortness of breath during exertion [Vomiting] : no vomiting [Constipation] : no constipation [Diarrhea] : no diarrhea [Heartburn] : no heartburn [Melena (black stool)] : no melena [Fecal Incontinence (soiling)] : no fecal incontinence [Bloating (gassiness)] : no bloating [Rash] : no rash [Arthralgias (joint pain)] : no arthralgias [Skin Lesions] : no skin lesions [Dizziness] : no dizziness [Fainting] : no fainting

## 2023-12-26 LAB — VIT C SERPL-MCNC: 0.8 MG/DL

## 2023-12-28 ENCOUNTER — TRANSCRIPTION ENCOUNTER (OUTPATIENT)
Age: 28
End: 2023-12-28

## 2024-01-10 LAB — CALPROTECTIN FECAL: 5860 UG/G

## 2024-01-11 ENCOUNTER — APPOINTMENT (OUTPATIENT)
Dept: PEDIATRIC GASTROENTEROLOGY | Facility: CLINIC | Age: 29
End: 2024-01-11

## 2024-01-11 ENCOUNTER — APPOINTMENT (OUTPATIENT)
Dept: GASTROENTEROLOGY | Facility: CLINIC | Age: 29
End: 2024-01-11
Payer: MEDICAID

## 2024-01-11 PROCEDURE — 99214 OFFICE O/P EST MOD 30 MIN: CPT | Mod: 95

## 2024-01-11 PROCEDURE — G2211 COMPLEX E/M VISIT ADD ON: CPT

## 2024-01-11 NOTE — PHYSICAL EXAM
[Alert] : alert [Healthy Appearing] : healthy appearing [No Acute Distress] : no acute distress [Sclera] : the sclera and conjunctiva were normal [Normal Lips/Gums] : the lips and gums were normal [Normal] : the appearance was normal, no neck mass [No Respiratory Distress] : no respiratory distress [Respiration, Rhythm And Depth] : normal respiratory rhythm and effort [Auscultation Breath Sounds / Voice Sounds] : lungs were clear to auscultation bilaterally [Heart Rate And Rhythm] : heart rate was normal and rhythm regular [Normal S1, S2] : normal S1 and S2 [Bowel Sounds] : normal bowel sounds [Abdomen Tenderness] : non-tender [Abdomen Soft] : soft [Abnormal Walk] : normal gait [Normal Color / Pigmentation] : normal skin color and pigmentation [] : no rash [No Focal Deficits] : no focal deficits [Oriented To Time, Place, And Person] : oriented to person, place, and time [Normal Affect] : the affect was normal [Normal Mood] : the mood was normal

## 2024-01-11 NOTE — HISTORY OF PRESENT ILLNESS
[Home] : at home, [unfilled] , at the time of the visit. [Medical Office: ([facility])___] : at the medical office located in  [Verbal consent obtained from patient] : the patient, [unfilled] [FreeTextEntry1] : The patient is a 29 y/o MALE w/ history of left-sided ulcerative colitis (diagnosed in 01/2022) w/ treatment failures to both Infliximab and Vedolizumab who was transitioned to Rinvoq in February/March 2023 who presents today for a follow-up visit. His last colonoscope was in June 2023 w/ findings: Normal mucosa in the terminal ileum. 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 Biopsies were obtained. There was mild erythema from sigmoid to splenic flexture from 20-30cm, consistent with Watt 1 disease. The proximal colon appeared unaffected. Exam with evidence of endoscopic healing and interval improvement. Plan: Advance diet as tolerated Continue current medical regimen, with addition of rectal steroid foam Discharge to home Patient will call office if any worrisome complaints, anticipatory guidance discussed Continue current medical regimen. Pathology findings: 1.  Colon, left; biopsy: -   Colonic mucosa with acute and chronic inflammation, and mild architectural distortion.  See note. - No dysplasia seen. Note: Sections reveal colonic mucosa with neutrophils in the lamina propria and in the crypt epithelium with crypt abscess, increased lymphoplasmacytic infiltration in the lamina propria, and mild architectural distortion.  Correlation with clinical findings is suggested.  Telemed today - frequency of BMs is 6-8 per day, blood when wiping. + nocturnal symptom on occasion. Having episodes of near incontinence.   12/20/23 office visit note: The patient was seen and examined in the office today. He states that he is feeling better compared to when he was on Infliximab and Vedolizumab. He states that at that time he was having frequent central abdominal pain associated w/ bloody diarrhea (up to 8-10 episodes per day). When he was transitioned to Rinvoq as well as a higher dose Prednisone taper in February/March 2023, he stated that that was the best he felt. His abdominal pain was minimal and infrequent abd bowel movements were reduced to 4 per day and were more formed. Blood was seen intermittently but not reliably with each bowel movement. Today, he states that he feels similar. He reports 4-6 bowel movements per day. They are all generally formed. They are not mucoid. He notices blood when wiping at times but does not see blood with the bowel movements. He denies dysphagia, odynophagia, nausea, vomiting, coffee-ground emesis, hematemesis, constipation, melena, and unintentional weight loss.  ----------------------------------------------------------------------------------------------- Subject's initials:  Subject # [id number]  Patient presents today for screening visit for [facility] for UC Device Trial.  Patient has a history of Ulcerative Colitis, currently on Rinvoq, previously exposed to IFX,VEDO, not in clinical remission despite 5mg prednisone daily.  Subject # [id number]- met all the inclusion criteria and did not meet any exclusion criteria for the NHAN for UC Device Trial and was enrolled on 10/5/23. The study was explained; the subject had the opportunity to ask questions, and was given a signed copy of the consent form. Consent was obtained prior to the start of any study procedures.  Interval hx: Was reporting near clinical remission - however now reporting BMs ~ 2-4 per day, some blood, no nocturnal symptoms. No abd pain. Prednisone dose decreased to 5mg for the last month. Nausea resolved. Less incontinence, can be able to leave the house a bit. Has had an episode of incontinence about twice/week only if he drinks coffee. Still tired/fatigued but that is better. Underwent iron infusions with Heme. Semi-formed stools.  CSCOPE 6/1/23: Normal mucosa in the terminal ileum.  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 Biopsies were obtained. There was mild erythema from sigmoid to splenic flexture from 20-30cm, consistent with Watt 1 disease. The proximal colon appeared unaffected. Exam with evidence of endoscopic healing and interval improvement. PATH: Final Diagnosis 1. Colon, left; biopsy: - Colonic mucosa with acute and chronic inflammation, and mild architectural distortion. See note. - No dysplasia seen.  Note: Sections reveal colonic mucosa with neutrophils in the lamina propria and in the crypt epithelium with crypt abscess, increased lymphoplasmacytic infiltration in the lamina propria, and mild architectural distortion. Correlation with clinical findings is suggested.    Concern for Crohn's disease based on recent CSCOPE showing TI and ICV inflammation however based on no abnormalities on pathology, patient will still be labeled as Ulcerative Colitis. Pt had appt with Dr. Barrios on 2/24 - and has been offered to come to the hospital several times to manage his pain, nausea and inflammation but has refused.  Prior HPI:  Pt is a 28 y/o male with tentative diagnosis of UC diagnosed 1/2022 presenting to [facility] care after insurance change, pt of Dr Soriano. Initial symptoms of urgency, rectal bleeding, and loose stool; colonoscopy 1/2022 with proctosigmoiditis and cecal erythema extending toward ascending colon and treated with PO budesonide and mesalamine suppository with control of symptoms. Pt had flare in 6/2022, was hospitalized at [facility], had CT with pancolitis, and treated with IV steroids with good response. He was discharged on PO prednisone and started on vedo in June. He is now s/p 1 maintenance dose of Vedo and has tapered to 15 mg with no recurrence of rectal bleeding but noticing incontinence of loose stools. 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 Also noted to have torn R acetabular labrum on MRI 6/2022 performed for R hip pain which has since resolved after steroids. Telemed today with his mom - pt began steroid taper with slight worsening of symptoms.  Since going to 10mg prednisone he's noticed inc in rectal bleeding with each BM - mostly when wiping. + urgency, has not started fiber. Having 7-8 BMs per day. Good appetite. Still having nocturnal stools. No weight loss. No n/v.  Mom has Hx of UC which is in remission; not currently being treated or hx of surgery.  Denies smoking, alcohol, marijuana, or illicit drug use.

## 2024-01-11 NOTE — REVIEW OF SYSTEMS
[Abdominal Pain] : abdominal pain [Swollen Glands] : swollen glands [Negative] : Heme/Lymph [Fever] : no fever [Chills] : no chills [Feeling Tired] : not feeling tired [Eye Pain] : no eye pain [Red Eyes] : eyes not red [Sore Throat] : no sore throat [Chest Pain] : no chest pain [Palpitations] : no palpitations [Lower Ext Edema (lower leg swelling)] : no lower extremity edema [Shortness Of Breath] : no shortness of breath [Cough] : no cough [SOB on Exertion] : no shortness of breath during exertion [Vomiting] : no vomiting [Constipation] : no constipation [Diarrhea] : no diarrhea [Heartburn] : no heartburn [Melena (black stool)] : no melena [Fecal Incontinence (soiling)] : no fecal incontinence [Rash] : no rash [Arthralgias (joint pain)] : no arthralgias [Skin Lesions] : no skin lesions [Dizziness] : no dizziness [Fainting] : no fainting

## 2024-01-11 NOTE — ASSESSMENT
[FreeTextEntry1] : The patient is a 27 y/o MALE w/ history of left-sided ulcerative colitis (diagnosed in 01/2022) w/ treatment failures to both Infliximab and Vedolizumab who was transitioned to Rinvoq in February/March 2023 who presents today for a follow-up visit. His colonoscopy showed active disease, however, was clinically improved compared to prior. Vital signs and physical exam are unremarkable. Lab values are significant for normal Hgb, Plts, ESR/CRP. His neurologist at [facility] ordered an extensive panel for workup of his encephalitis/CIDP? and the only positive tests were pANCA and HBV core Ab positivity. He has exhibited clinical response to Rinvoq but is not currently in clinical remission.   #Left sided ulcerative colitis (moderate) w/ treatment failures to Infliximab/Vedolizumab currently exhibiting clinical response to Rinvoq.  Has had more sx as steroids have tapered.  - Symptomology and colonoscopy results as above in HPI - C/w Rinvoq + Prednisone 5 mg - OTC Vitamin D/Calcium supplementation - Serum and stool testing - For consideration of Omvoh / UST initiation; given his prior clinical failures on therapy, we would look to add this medication to current therapy.  Explained this is beyond the level of guidelines for standard therapy and only case report data to combine sm + biologic, and he understands. - The patient may have to drop out of VNS device trial (currently does not report any benefit; also reports technical difficulties) if there are changes made to his medications in the future - he did not go for his visit today  #HBV Core Ab positivity - Normal LFTs - Obtain HAV IgG, HBsAg, HBsAb, HBV DNA PCR, HCV Ab at next visit prior to possible initiation of biologic  Plan to add UST or Omvo to current regimen given worsening clinical symptoms and calpro

## 2024-01-16 ENCOUNTER — APPOINTMENT (OUTPATIENT)
Dept: PEDIATRIC GASTROENTEROLOGY | Facility: CLINIC | Age: 29
End: 2024-01-16

## 2024-01-16 VITALS
RESPIRATION RATE: 18 BRPM | OXYGEN SATURATION: 99 % | HEART RATE: 76 BPM | BODY MASS INDEX: 20.71 KG/M2 | WEIGHT: 147.93 LBS | HEIGHT: 70.79 IN | DIASTOLIC BLOOD PRESSURE: 74 MMHG | SYSTOLIC BLOOD PRESSURE: 111 MMHG

## 2024-01-31 ENCOUNTER — OUTPATIENT (OUTPATIENT)
Dept: OUTPATIENT SERVICES | Facility: HOSPITAL | Age: 29
LOS: 1 days | End: 2024-01-31
Payer: MEDICAID

## 2024-01-31 ENCOUNTER — APPOINTMENT (OUTPATIENT)
Dept: INFUSION THERAPY | Facility: CLINIC | Age: 29
End: 2024-01-31

## 2024-01-31 VITALS
HEART RATE: 91 BPM | DIASTOLIC BLOOD PRESSURE: 72 MMHG | SYSTOLIC BLOOD PRESSURE: 115 MMHG | TEMPERATURE: 99 F | HEIGHT: 71 IN | WEIGHT: 149.91 LBS | OXYGEN SATURATION: 99 % | RESPIRATION RATE: 16 BRPM

## 2024-01-31 VITALS
OXYGEN SATURATION: 98 % | DIASTOLIC BLOOD PRESSURE: 66 MMHG | RESPIRATION RATE: 16 BRPM | HEART RATE: 80 BPM | SYSTOLIC BLOOD PRESSURE: 103 MMHG | TEMPERATURE: 98 F

## 2024-01-31 DIAGNOSIS — K51.90 ULCERATIVE COLITIS, UNSPECIFIED, WITHOUT COMPLICATIONS: ICD-10-CM

## 2024-01-31 LAB
ALBUMIN SERPL ELPH-MCNC: 4.1 G/DL — SIGNIFICANT CHANGE UP (ref 3.3–5)
ALP SERPL-CCNC: 59 U/L — SIGNIFICANT CHANGE UP (ref 40–120)
ALT FLD-CCNC: 25 U/L — SIGNIFICANT CHANGE UP (ref 10–45)
ANION GAP SERPL CALC-SCNC: 8 MMOL/L — SIGNIFICANT CHANGE UP (ref 5–17)
AST SERPL-CCNC: 18 U/L — SIGNIFICANT CHANGE UP (ref 10–40)
BILIRUB SERPL-MCNC: 0.2 MG/DL — SIGNIFICANT CHANGE UP (ref 0.2–1.2)
BUN SERPL-MCNC: 11 MG/DL — SIGNIFICANT CHANGE UP (ref 7–23)
CALCIUM SERPL-MCNC: 9.5 MG/DL — SIGNIFICANT CHANGE UP (ref 8.4–10.5)
CHLORIDE SERPL-SCNC: 102 MMOL/L — SIGNIFICANT CHANGE UP (ref 96–108)
CO2 SERPL-SCNC: 31 MMOL/L — SIGNIFICANT CHANGE UP (ref 22–31)
CREAT SERPL-MCNC: 0.83 MG/DL — SIGNIFICANT CHANGE UP (ref 0.5–1.3)
CRP SERPL-MCNC: 10.8 MG/L — HIGH (ref 0–4)
EGFR: 123 ML/MIN/1.73M2 — SIGNIFICANT CHANGE UP
GLUCOSE SERPL-MCNC: 101 MG/DL — HIGH (ref 70–99)
HCT VFR BLD CALC: 44 % — SIGNIFICANT CHANGE UP (ref 39–50)
HGB BLD-MCNC: 14.2 G/DL — SIGNIFICANT CHANGE UP (ref 13–17)
MCHC RBC-ENTMCNC: 27.6 PG — SIGNIFICANT CHANGE UP (ref 27–34)
MCHC RBC-ENTMCNC: 32.3 GM/DL — SIGNIFICANT CHANGE UP (ref 32–36)
MCV RBC AUTO: 85.6 FL — SIGNIFICANT CHANGE UP (ref 80–100)
NRBC # BLD: 0 /100 WBCS — SIGNIFICANT CHANGE UP (ref 0–0)
PLATELET # BLD AUTO: 346 K/UL — SIGNIFICANT CHANGE UP (ref 150–400)
POTASSIUM SERPL-MCNC: 3.4 MMOL/L — LOW (ref 3.5–5.3)
POTASSIUM SERPL-SCNC: 3.4 MMOL/L — LOW (ref 3.5–5.3)
PROT SERPL-MCNC: 7.2 G/DL — SIGNIFICANT CHANGE UP (ref 6–8.3)
RBC # BLD: 5.14 M/UL — SIGNIFICANT CHANGE UP (ref 4.2–5.8)
RBC # FLD: 12.4 % — SIGNIFICANT CHANGE UP (ref 10.3–14.5)
SODIUM SERPL-SCNC: 141 MMOL/L — SIGNIFICANT CHANGE UP (ref 135–145)
WBC # BLD: 6.97 K/UL — SIGNIFICANT CHANGE UP (ref 3.8–10.5)
WBC # FLD AUTO: 6.97 K/UL — SIGNIFICANT CHANGE UP (ref 3.8–10.5)

## 2024-01-31 PROCEDURE — 36415 COLL VENOUS BLD VENIPUNCTURE: CPT

## 2024-01-31 PROCEDURE — 80053 COMPREHEN METABOLIC PANEL: CPT

## 2024-01-31 PROCEDURE — 85027 COMPLETE CBC AUTOMATED: CPT

## 2024-01-31 PROCEDURE — 96413 CHEMO IV INFUSION 1 HR: CPT

## 2024-01-31 PROCEDURE — 86140 C-REACTIVE PROTEIN: CPT

## 2024-01-31 RX ORDER — USTEKINUMAB 45 MG/.5ML
390 INJECTION, SOLUTION SUBCUTANEOUS ONCE
Refills: 0 | Status: COMPLETED | OUTPATIENT
Start: 2024-01-31 | End: 2024-01-31

## 2024-01-31 RX ADMIN — USTEKINUMAB 250 MILLIGRAM(S): 45 INJECTION, SOLUTION SUBCUTANEOUS at 15:39

## 2024-01-31 RX ADMIN — USTEKINUMAB 390 MILLIGRAM(S): 45 INJECTION, SOLUTION SUBCUTANEOUS at 16:39

## 2024-02-05 ENCOUNTER — APPOINTMENT (OUTPATIENT)
Dept: GASTROENTEROLOGY | Facility: CLINIC | Age: 29
End: 2024-02-05
Payer: MEDICAID

## 2024-02-05 VITALS
DIASTOLIC BLOOD PRESSURE: 68 MMHG | BODY MASS INDEX: 21.47 KG/M2 | OXYGEN SATURATION: 99 % | SYSTOLIC BLOOD PRESSURE: 106 MMHG | HEART RATE: 83 BPM | RESPIRATION RATE: 16 BRPM | HEIGHT: 70 IN | WEIGHT: 150 LBS | TEMPERATURE: 97.9 F

## 2024-02-05 PROCEDURE — G2211 COMPLEX E/M VISIT ADD ON: CPT | Mod: NC,1L

## 2024-02-05 PROCEDURE — 99215 OFFICE O/P EST HI 40 MIN: CPT

## 2024-02-05 RX ORDER — MULTIVITAMIN/IRON/FOLIC ACID 18MG-0.4MG
600-400 TABLET ORAL DAILY
Qty: 180 | Refills: 1 | Status: COMPLETED | COMMUNITY
Start: 2022-09-19 | End: 2024-02-05

## 2024-02-05 RX ORDER — UPADACITINIB 30 MG/1
30 TABLET, EXTENDED RELEASE ORAL
Qty: 90 | Refills: 2 | Status: COMPLETED | COMMUNITY
Start: 2023-02-17 | End: 2024-02-05

## 2024-02-06 LAB
25(OH)D3 SERPL-MCNC: 49.2 NG/ML
ALBUMIN SERPL ELPH-MCNC: 4.4 G/DL
ALP BLD-CCNC: 59 U/L
ALT SERPL-CCNC: 26 U/L
ANION GAP SERPL CALC-SCNC: 12 MMOL/L
AST SERPL-CCNC: 15 U/L
BILIRUB SERPL-MCNC: 0.3 MG/DL
BUN SERPL-MCNC: 13 MG/DL
CALCIUM SERPL-MCNC: 9.4 MG/DL
CHLORIDE SERPL-SCNC: 104 MMOL/L
CO2 SERPL-SCNC: 28 MMOL/L
CREAT SERPL-MCNC: 0.8 MG/DL
CRP SERPL-MCNC: 21 MG/L
EGFR: 124 ML/MIN/1.73M2
FERRITIN SERPL-MCNC: 18 NG/ML
FOLATE SERPL-MCNC: 8.4 NG/ML
GLUCOSE SERPL-MCNC: 86 MG/DL
HCT VFR BLD CALC: 45.7 %
HGB BLD-MCNC: 14.3 G/DL
MCHC RBC-ENTMCNC: 27.5 PG
MCHC RBC-ENTMCNC: 31.3 GM/DL
MCV RBC AUTO: 87.9 FL
PHOSPHATE SERPL-MCNC: 3.9 MG/DL
PLATELET # BLD AUTO: 308 K/UL
POTASSIUM SERPL-SCNC: 4.7 MMOL/L
PROT SERPL-MCNC: 7.1 G/DL
RBC # BLD: 5.2 M/UL
RBC # FLD: 12.3 %
SODIUM SERPL-SCNC: 144 MMOL/L
VIT B12 SERPL-MCNC: 568 PG/ML
WBC # FLD AUTO: 5.15 K/UL

## 2024-02-06 NOTE — HISTORY OF PRESENT ILLNESS
[FreeTextEntry1] : 27 y/o MALE w/ history of left-sided ulcerative colitis (diagnosed in 01/2022) w/ treatment failures to both Infliximab and Vedolizumab who was transitioned to Rinvoq in February/March 2023 and currently off Rinvoq for 9 days and started on Stelara on 1/31 induction therapy and chronically on prednisone 5mg qdaily presents for follow up visit. Pt states that the day after his infusion he noticed peeling of his skin on his face a/w pruritus and peeling of skin and slight oozing of blood. He contacted derm and was started on topical antifungal and topical hydrocortisone therapy and symptoms are being controlled. pt denies any other reaction. Pt is having 6-8 solid brown stools with occasional spots of blood at 5-10% of the time w/ intermittent nocturnal symptoms to have a bm. these symptoms are his baseline since bein on biological therapy and there is no recent evolution of his symptoms s/p stelara.

## 2024-02-06 NOTE — ASSESSMENT
[FreeTextEntry1] : 27 y/o MALE w/ history of left-sided ulcerative colitis (diagnosed in 01/2022) w/ treatment failures to both Infliximab and Vedolizumab who was transitioned to Rinvoq maintainence with 30mg qdaily in February/March 2023 and currently off Rinvoq for 9 days and started on Stelara on 1/31 loading dose and chronically on prednisone 5mg qdaily presents for follow up visit.  #Left sided ulcerative colitis (moderate) w/ treatment failures to Infliximab/Vedolizumab - Currently on Stelara loading therapy after recent discontinuation of UPA (since 1/29/2024).  No significant decline since holding UPA. - Fecal calpro (7/2023) 2240 -> (12/2023) 5860 - CF 1/2023 - Proctosigmoiditis Watt 2 consistent with UC upto 20cm. Erythema and abnormal vascularity 20-30cm segment Watt 1. biopsies consistent with UC. Normal TI (including biopsies) - MRE on 10/2022 -  Suspect active mild sigmoid colitis and questionable terminal ileitis, which could represent backwash ileitis. Otherwise no signs of active small bowel inflammation. - Stelara loading therapy started on 1/31 - Only developed pruritic, peeling eczematous rash on face currently on topical antifungal and topical hydrocortisone therapy per derm.  If this keeps getting worse, then not clear that we can continue with UST.   - Chronically on Prednisone 5mg qdaily to continue for now.  Plan - c/w topical treatment for facial rash per derm - will start maintenance stelara therapy in 7 weeks, IF facial eczematous process if cleared (unclear if drug effect, but timing is suspicious) - OTC Vitamin D/Calcium supplementation - Serum (including vitamins/minerals) and stool testing - Pt no longer on VNS device trial - did not experience any clinical benefit and had technical difficulties - dietary counselling provided - will have pt follow up in 3-4 weeks  #HCM - Vaccines UTD per patient - Quant -ve - Evidence of iron deficiency anemia - will start po iron therapy - non smoker  #HBV Core Ab positivity - Normal LFTs - will obtain HAV IgG, HBsAg, HBsAb, HBV DNA PCR, HCV Ab   f/u in 4 weeks to evaluate evolution of facial rash.

## 2024-02-06 NOTE — PHYSICAL EXAM
[Alert] : alert [Normal Voice/Communication] : normal voice/communication [Healthy Appearing] : healthy appearing [No Acute Distress] : no acute distress [Sclera] : the sclera and conjunctiva were normal [Hearing Threshold Finger Rub Not [place]] : hearing was normal [Normal Lips/Gums] : the lips and gums were normal [Oropharynx] : the oropharynx was normal [Normal Appearance] : the appearance of the neck was normal [No Neck Mass] : no neck mass was observed [No Respiratory Distress] : no respiratory distress [No Acc Muscle Use] : no accessory muscle use [Respiration, Rhythm And Depth] : normal respiratory rhythm and effort [Auscultation Breath Sounds / Voice Sounds] : lungs were clear to auscultation bilaterally [Heart Rate And Rhythm] : heart rate was normal and rhythm regular [Normal S1, S2] : normal S1 and S2 [Murmurs] : no murmurs [Bowel Sounds] : normal bowel sounds [No Masses] : no abdominal mass palpated [Abdomen Tenderness] : non-tender [Abdomen Soft] : soft [] : no hepatosplenomegaly [Oriented To Time, Place, And Person] : oriented to person, place, and time [de-identified] : pruritic rashes on forehead and cheeks of the skin without ulceration

## 2024-02-07 LAB
HBV SURFACE AB SER QL: NONREACTIVE
HBV SURFACE AB SERPL IA-ACNC: <3 MIU/ML
HBV SURFACE AG SER QL: NONREACTIVE
HCV AB SER QL: NONREACTIVE
HCV S/CO RATIO: 0.08 S/CO
HEPATITIS A IGG ANTIBODY: REACTIVE

## 2024-02-08 ENCOUNTER — TRANSCRIPTION ENCOUNTER (OUTPATIENT)
Age: 29
End: 2024-02-08

## 2024-02-12 LAB
HDV AB SER IA-ACNC: NEGATIVE
VIT C SERPL-MCNC: 1.1 MG/DL

## 2024-02-15 LAB — ZINC SERPL-MCNC: 67 UG/DL

## 2024-02-16 ENCOUNTER — OUTPATIENT (OUTPATIENT)
Dept: OUTPATIENT SERVICES | Facility: HOSPITAL | Age: 29
LOS: 1 days | End: 2024-02-16

## 2024-02-16 ENCOUNTER — APPOINTMENT (OUTPATIENT)
Dept: PRIMARY CARE | Facility: CLINIC | Age: 29
End: 2024-02-16

## 2024-02-26 ENCOUNTER — RX RENEWAL (OUTPATIENT)
Age: 29
End: 2024-02-26

## 2024-02-27 ENCOUNTER — APPOINTMENT (OUTPATIENT)
Dept: GASTROENTEROLOGY | Facility: CLINIC | Age: 29
End: 2024-02-27
Payer: MEDICAID

## 2024-02-27 PROCEDURE — 97802 MEDICAL NUTRITION INDIV IN: CPT | Mod: 95

## 2024-02-28 ENCOUNTER — APPOINTMENT (OUTPATIENT)
Dept: GASTROENTEROLOGY | Facility: CLINIC | Age: 29
End: 2024-02-28

## 2024-03-07 ENCOUNTER — APPOINTMENT (OUTPATIENT)
Dept: GASTROENTEROLOGY | Facility: CLINIC | Age: 29
End: 2024-03-07
Payer: MEDICAID

## 2024-03-07 VITALS
BODY MASS INDEX: 21.05 KG/M2 | RESPIRATION RATE: 14 BRPM | HEART RATE: 82 BPM | WEIGHT: 147 LBS | OXYGEN SATURATION: 98 % | SYSTOLIC BLOOD PRESSURE: 110 MMHG | TEMPERATURE: 96.8 F | DIASTOLIC BLOOD PRESSURE: 60 MMHG | HEIGHT: 70 IN

## 2024-03-07 DIAGNOSIS — R11.0 NAUSEA: ICD-10-CM

## 2024-03-07 DIAGNOSIS — R21 RASH AND OTHER NONSPECIFIC SKIN ERUPTION: ICD-10-CM

## 2024-03-07 DIAGNOSIS — D50.9 IRON DEFICIENCY ANEMIA, UNSPECIFIED: ICD-10-CM

## 2024-03-07 PROCEDURE — 99214 OFFICE O/P EST MOD 30 MIN: CPT

## 2024-03-07 PROCEDURE — G2211 COMPLEX E/M VISIT ADD ON: CPT | Mod: NC,1L

## 2024-03-07 RX ORDER — ACETYLCYSTEINE 200 MG/ML
INJECTION INTRAVENOUS
Refills: 0 | Status: COMPLETED | COMMUNITY
End: 2024-03-07

## 2024-03-07 RX ORDER — PERPHENAZINE 8 MG
TABLET ORAL
Refills: 0 | Status: ACTIVE | COMMUNITY

## 2024-03-07 RX ORDER — GINGER ROOT/GINGER ROOT EXT 262.5 MG
CAPSULE ORAL
Refills: 0 | Status: ACTIVE | COMMUNITY

## 2024-03-07 RX ORDER — CALCIUM CARBONATE/VITAMIN D3 600 MG-10
TABLET ORAL
Refills: 0 | Status: ACTIVE | COMMUNITY

## 2024-03-07 RX ORDER — PREDNISONE 10 MG/1
10 TABLET ORAL
Qty: 90 | Refills: 2 | Status: COMPLETED | COMMUNITY
Start: 2024-02-26 | End: 2024-03-07

## 2024-03-08 NOTE — HISTORY OF PRESENT ILLNESS
[FreeTextEntry1] : 6/2023: Mucosa Normal mucosa was noted in the terminal ileum. 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 Biopsies were obtained. There was mild erythema from sigmoid to splenic flexture from 20-30cm, consistent with Watt 1 disease. The proximal colon appeared unaffected.

## 2024-03-08 NOTE — PHYSICAL EXAM
[Normal Voice/Communication] : normal voice/communication [Alert] : alert [Healthy Appearing] : healthy appearing [No Acute Distress] : no acute distress [Hearing Threshold Finger Rub Not [place]] : hearing was normal [Sclera] : the sclera and conjunctiva were normal [Oropharynx] : the oropharynx was normal [Normal Lips/Gums] : the lips and gums were normal [No Neck Mass] : no neck mass was observed [Normal Appearance] : the appearance of the neck was normal [No Respiratory Distress] : no respiratory distress [Abdomen Tenderness] : non-tender [No Masses] : no abdominal mass palpated [Abdomen Soft] : soft [Oriented To Time, Place, And Person] : oriented to person, place, and time [Abnormal Walk] : normal gait [de-identified] : dry flaking skin on forehead and cheeks of the skin without ulceration

## 2024-03-08 NOTE — ASSESSMENT
[FreeTextEntry1] : 27 y/o MALE w/ history of left-sided ulcerative colitis (diagnosed in 01/2022) started on Stelara on 1/31 induction therapy and chronically on prednisone 5mg qdaily w/ treatment failures to both Infliximab and Vedolizumab, was transitioned to Rinvoq in February/March 2023, had clinical improvement with 45mg however decreased response at 30mg, and the drug was held to identify if would req. a combination approach.   #Left sided ulcerative colitis (moderate) w/ treatment failures to Infliximab/Vedolizumab - Currently on Stelara loading therapy after recent discontinuation of UPA (since 1/29/2024).  No significant decline since holding UPA. -- CF 1/2023 - Proctosigmoiditis Watt 2 consistent with UC upto 20cm. Erythema and abnormal vascularity 20-30cm segment Watt 1. biopsies consistent with UC. Normal TI (including biopsies);  Fecal calpro (7/2023) 2240 -> (12/2023) 586 - MRE on 10/2022 -  Suspect active mild sigmoid colitis and questionable terminal ileitis, which could represent backwash ileitis. Otherwise no signs of active small bowel inflammation. - Stelara loading therapy started on 1/31 - Only developed pruritic, peeling eczematous rash on face that is improving without any treatment. Only used topical antifungal and steroid prescribed by derm once - Chronically on Prednisone 5mg qdaily, advise patient to trial taper to every other day if tolerates   Plan - will start maintenance stelara therapy in 3 weeks, IF facial eczematous process reoccurs may need to consider discontinuation, if no exacerbation of rash may consider addition of Rinvoq 30mg - advise prednisone taper as tolerated - OTC Vitamin D/Calcium supplementation and thiamine supplements as per RDN recs - Pt no longer on VNS device trial - did not experience any clinical benefit and had technical difficulties  #HCM - Vaccines UTD per patient - Quant -ve - Evidence of iron deficiency, however Hgb stable, advise iron rich diet  - non smoker #HBV Core Ab positivity however repeat Hepatitis B core nonreactive 11/2023 - check Hep B surface AB and hep b surface ag neg ; lft's wnl  f/u 2-4 weeks after Stelara injection

## 2024-03-13 LAB — CALPROTECTIN FECAL: 4210 UG/G

## 2024-03-21 ENCOUNTER — APPOINTMENT (OUTPATIENT)
Dept: GASTROENTEROLOGY | Facility: CLINIC | Age: 29
End: 2024-03-21
Payer: MEDICAID

## 2024-03-21 PROCEDURE — G2211 COMPLEX E/M VISIT ADD ON: CPT | Mod: NC,1L

## 2024-03-21 PROCEDURE — 99214 OFFICE O/P EST MOD 30 MIN: CPT | Mod: 95

## 2024-03-21 RX ORDER — UPADACITINIB 30 MG/1
30 TABLET, EXTENDED RELEASE ORAL
Qty: 30 | Refills: 2 | Status: ACTIVE | COMMUNITY
Start: 2024-03-21 | End: 1900-01-01

## 2024-03-21 NOTE — HISTORY OF PRESENT ILLNESS
[Home] : at home, [unfilled] , at the time of the visit. [Medical Office: ([facility])___] : at the medical office located in  [Verbal consent obtained from patient] : the patient, [unfilled] [FreeTextEntry1] : 6/2023: Mucosa Normal mucosa was noted in the terminal ileum. 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 Biopsies were obtained. There was mild erythema from sigmoid to splenic flexture from 20-30cm, consistent with Watt 1 disease. The proximal colon appeared unaffected.

## 2024-03-21 NOTE — ASSESSMENT
[FreeTextEntry1] : 30 y/o MALE w/ history of left-sided ulcerative colitis (diagnosed in 01/2022) started on Stelara on 1/31 induction therapy and chronically on prednisone 5mg q daily w/ treatment failures to both Infliximab and Vedolizumab, was transitioned to Rinvoq in February/March 2023, had clinical improvement with 45mg however decreased response at 30mg, and the drug was held to identify if would req. a combination approach.   #Left sided ulcerative colitis (moderate) w/ treatment failures to Infliximab/Vedolizumab - Currently on Stelara loading therapy after recent discontinuation of UPA (since 1/29/2024).  Now with slow decline since holding UPA. -- CF 1/2023 - Proctosigmoiditis Watt 2 consistent with UC up to 20cm. Erythema and abnormal vascularity 20-30cm segment Watt 1. biopsies consistent with UC. Normal TI (including biopsies); Fecal calpro (7/2023) 2240 -> (12/2023) 586 - MRE on 10/2022 - suspect active mild sigmoid colitis and questionable terminal ileitis, which could represent backwash ileitis. Otherwise no signs of active small bowel inflammation. - Stelara loading therapy started on 1/31 - Only developed pruritic, peeling eczematous rash on face that is improving without any treatment. Only used topical antifungal and steroid prescribed by derm once - Chronically on Prednisone 5mg qdaily, advise patient to trial taper to every other day if tolerates - given worsening of disease, plan for adding Rinvoq again at 30mg; flare confirmed with calpro and CRP both elevated - pt has appt next week for first injection of Stelara maintenance next week  - advise prednisone taper as tolerated - OTC Vitamin D/Calcium supplementation and thiamine supplements as per RDN recs - Pt no longer on VNS device trial - did not experience any clinical benefit and had technical difficulties  #HCM  - Vaccines UTD per patient - Quant -ve  - Evidence of iron deficiency, however Hgb stable, advise iron rich diet  - non smoker  #HBV Core Ab positivity however repeat Hepatitis B core nonreactive 11/2023 - check Hep B surface AB and hep b surface ag neg ; lft's wnl   F/U 1 week for first maintenance Stelara injection

## 2024-03-28 ENCOUNTER — APPOINTMENT (OUTPATIENT)
Dept: GASTROENTEROLOGY | Facility: CLINIC | Age: 29
End: 2024-03-28
Payer: MEDICAID

## 2024-03-28 PROCEDURE — 96372 THER/PROPH/DIAG INJ SC/IM: CPT

## 2024-03-28 RX ORDER — USTEKINUMAB 90 MG/ML
INJECTION, SOLUTION SUBCUTANEOUS
Refills: 0 | Status: COMPLETED | OUTPATIENT
Start: 2024-03-28

## 2024-03-28 RX ADMIN — USTEKINUMAB 0 MG/ML: 90 INJECTION, SOLUTION SUBCUTANEOUS at 00:00

## 2024-03-28 NOTE — ASSESSMENT
[FreeTextEntry1] : Stelara NDC 99002-121-37 Exp 8/2026 LOT [id number]  Injected subcutaneously to abdomen without any ADRs. Pt felt well, injection site WNL. Pt has telemed in 2 weeks and plan for labs in 8 weeks with next Stelara injection. Pt to call with any questions/concerns.

## 2024-03-28 NOTE — HISTORY OF PRESENT ILLNESS
[FreeTextEntry1] : Patient here today for Stelara injection. No new complaints. Retarted Rinvoq 3 nights ago, stable symptoms.

## 2024-04-09 ENCOUNTER — APPOINTMENT (OUTPATIENT)
Dept: GASTROENTEROLOGY | Facility: CLINIC | Age: 29
End: 2024-04-09
Payer: MEDICAID

## 2024-04-09 PROCEDURE — 99213 OFFICE O/P EST LOW 20 MIN: CPT

## 2024-04-09 PROCEDURE — G2211 COMPLEX E/M VISIT ADD ON: CPT | Mod: NC,1L

## 2024-04-10 NOTE — HISTORY OF PRESENT ILLNESS
[Home] : at home, [unfilled] , at the time of the visit. [Medical Office: ([facility])___] : at the medical office located in  [Verbal consent obtained from patient] : the patient, [unfilled] [Mother] : mother [FreeTextEntry1] : 6/2023: Mucosa Normal mucosa was noted in the terminal ileum. 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 Biopsies were obtained. There was mild erythema from sigmoid to splenic flexture from 20-30cm, consistent with Watt 1 disease. The proximal colon appeared unaffected.

## 2024-04-10 NOTE — ASSESSMENT
[FreeTextEntry1] : 28 y/o MALE w/ history of left-sided ulcerative colitis (diagnosed in 01/2022) started on Stelara on 1/31 induction therapy and chronically on prednisone 5mg q daily w/ treatment failures to both Infliximab and Vedolizumab, was transitioned to Rinvoq in February/March 2023, had clinical improvement with 45mg however decreased response at 30mg, and the drug was held to identify if would req. a combination approach. Now restarted Rinvoq 30mg x 2 weeks in combination with Stelara with clinical response.  #Left sided ulcerative colitis (moderate) w/ treatment failures to Infliximab/Vedolizumab - Currently on Stelara, re-initiation Rinvoq at 30mg after slow decline since holding UPA. - continue current treatment plan with Rinvoq + Stelara; plan to consider prednisone taper by every other day in 4 weeks if symptoms completely remain stable and he is in a clinical remission; remain on Calcium/Vit D 3 while on pred  -- March 2024 Fecal calpro ~ 4000; Fecal calpro (7/2023) 2240 -> (12/2023) 586 - can use calpro to track inflam at next visit  - last CSCOPE June showing improvement on Rinvoq, however will likely need repeat to assess response to combo Stelara + Rinvoq (?this summer?)  - MRE on 10/2022 - suspect active mild sigmoid colitis and questionable terminal ileitis, which could represent backwash ileitis. Otherwise no signs of active small bowel inflammation. - Stelara loading therapy started on 1/31 - Only developed pruritic, peeling eczematous rash on face that is now resolved - Pt no longer on VNS device trial - did not experience any clinical benefit and had technical difficulties - repeat labs at f/u - CRP tracks inflam   #HCM  - Vaccines UTD per patient - immune to Hep A/B - will need DEXA scan once off prednisone  - Evidence of iron deficiency, however Hgb stable, advise iron rich diet; has been referred to Hematology - non smoker  F/U for next Stelara maintenance injection and get update on Steroid taper (week of May 23rd) All patient and mother's questions answered in detail.

## 2024-05-22 ENCOUNTER — APPOINTMENT (OUTPATIENT)
Dept: GASTROENTEROLOGY | Facility: CLINIC | Age: 29
End: 2024-05-22
Payer: MEDICAID

## 2024-05-22 VITALS
RESPIRATION RATE: 18 BRPM | OXYGEN SATURATION: 97 % | HEART RATE: 84 BPM | HEIGHT: 70 IN | DIASTOLIC BLOOD PRESSURE: 62 MMHG | WEIGHT: 150 LBS | BODY MASS INDEX: 21.47 KG/M2 | SYSTOLIC BLOOD PRESSURE: 100 MMHG | TEMPERATURE: 98.3 F

## 2024-05-22 DIAGNOSIS — Z71.89 OTHER SPECIFIED COUNSELING: ICD-10-CM

## 2024-05-22 DIAGNOSIS — D84.9 IMMUNODEFICIENCY, UNSPECIFIED: ICD-10-CM

## 2024-05-22 DIAGNOSIS — Z87.19 PERSONAL HISTORY OF OTHER DISEASES OF THE DIGESTIVE SYSTEM: ICD-10-CM

## 2024-05-22 DIAGNOSIS — K51.90 ULCERATIVE COLITIS, UNSPECIFIED, W/OUT COMPLICATIONS: ICD-10-CM

## 2024-05-22 PROCEDURE — 99214 OFFICE O/P EST MOD 30 MIN: CPT

## 2024-05-22 PROCEDURE — G2211 COMPLEX E/M VISIT ADD ON: CPT | Mod: NC,1L

## 2024-05-22 RX ORDER — PREDNISONE 5 MG/1
5 TABLET ORAL DAILY
Qty: 30 | Refills: 2 | Status: DISCONTINUED | COMMUNITY
Start: 2022-09-20 | End: 2024-05-22

## 2024-05-22 RX ORDER — USTEKINUMAB 90 MG/ML
90 INJECTION, SOLUTION SUBCUTANEOUS
Refills: 0 | Status: COMPLETED | OUTPATIENT
Start: 2024-05-22

## 2024-05-22 RX ADMIN — USTEKINUMAB 90 MG/ML: 90 INJECTION, SOLUTION SUBCUTANEOUS at 00:00

## 2024-05-23 PROBLEM — Z71.89 ENCOUNTER FOR MEDICATION COUNSELING: Status: ACTIVE | Noted: 2024-05-23

## 2024-05-23 PROBLEM — D84.9 IMMUNOSUPPRESSION: Status: ACTIVE | Noted: 2024-03-07

## 2024-05-23 NOTE — ASSESSMENT
[FreeTextEntry1] : 28 y/o MALE w/ history of left-sided ulcerative colitis (diagnosed in 01/2022) started on Stelara on 1/31 induction therapy and chronically on prednisone 5mg q daily w/ treatment failures to both Infliximab and Vedolizumab, was transitioned to Rinvoq in February/March 2023, had clinical improvement with 45mg however decreased response at 30mg, and the drug was held to identify if would req. a combination approach. Restarted Rinvoq 30mg in combination with Stelara with clinical response. Patient here for follow up to discuss steroid taper and receive Stelara injection.  Overall, feeling the best he's felt in a long while.   #Left sided ulcerative colitis (moderate) w/ treatment failures to Infliximab/Vedolizumab - Currently on Stelara, re-initiation Rinvoq at 30mg after slow decline since holding UPA.  - continue current combo treatment plan with Rinvoq + Stelara; injection given today - discussed beginning very slow prednisone taper by 1 mg weekly as tolerated and patient agreeable  - remain on Calcium/Vit D 3 while on pred  -March 2024 Fecal calpro ~ 4000; Fecal calpro (7/2023) 2240 -> (12/2023) 586 - repeat fecal calprotectin today  - last CSCOPE June showing improvement on Rinvoq, however need repeat to assess response to combo Stelara + Rinvoq, will schedule patient for cscope today (r/a/i/b discussed and patient agreeable) - MRE on 10/2022 - suspect active mild sigmoid colitis and questionable terminal ileitis, which could represent backwash ileitis. Otherwise no signs of active small bowel inflammation. - Pt no longer on VNS device trial - did not experience any clinical benefit and had technical difficulties - can take imodium at bed time to minimize 2x/wk early awakenings due to bowel urgency  #HCM  - Vaccines UTD per patient - immune to Hep A/B - will need DEXA scan once off prednisone  - Evidence of iron deficiency, however Hgb stable, advise iron rich diet; has been referred to Hematology - non smoker  f/u post colonoscopy

## 2024-05-29 RX ORDER — USTEKINUMAB 90 MG/ML
90 INJECTION, SOLUTION SUBCUTANEOUS
Qty: 1 | Refills: 6 | Status: ACTIVE | COMMUNITY
Start: 2024-02-07 | End: 1900-01-01

## 2024-06-21 ENCOUNTER — NON-APPOINTMENT (OUTPATIENT)
Age: 29
End: 2024-06-21

## 2024-06-21 ENCOUNTER — APPOINTMENT (OUTPATIENT)
Dept: GASTROENTEROLOGY | Facility: CLINIC | Age: 29
End: 2024-06-21

## 2024-07-11 ENCOUNTER — RESULT REVIEW (OUTPATIENT)
Age: 29
End: 2024-07-11

## 2024-07-11 ENCOUNTER — APPOINTMENT (OUTPATIENT)
Age: 29
End: 2024-07-11
Payer: MEDICAID

## 2024-07-11 PROCEDURE — 45380 COLONOSCOPY AND BIOPSY: CPT

## 2024-07-17 ENCOUNTER — TRANSCRIPTION ENCOUNTER (OUTPATIENT)
Age: 29
End: 2024-07-17

## 2024-07-23 ENCOUNTER — APPOINTMENT (OUTPATIENT)
Dept: GASTROENTEROLOGY | Facility: CLINIC | Age: 29
End: 2024-07-23
Payer: MEDICAID

## 2024-07-23 DIAGNOSIS — D84.9 IMMUNODEFICIENCY, UNSPECIFIED: ICD-10-CM

## 2024-07-23 DIAGNOSIS — D50.9 IRON DEFICIENCY ANEMIA, UNSPECIFIED: ICD-10-CM

## 2024-07-23 DIAGNOSIS — K51.90 ULCERATIVE COLITIS, UNSPECIFIED, W/OUT COMPLICATIONS: ICD-10-CM

## 2024-07-23 PROCEDURE — 99214 OFFICE O/P EST MOD 30 MIN: CPT

## 2024-07-23 PROCEDURE — G2211 COMPLEX E/M VISIT ADD ON: CPT | Mod: NC,1L

## 2024-07-24 ENCOUNTER — APPOINTMENT (OUTPATIENT)
Dept: GASTROENTEROLOGY | Facility: CLINIC | Age: 29
End: 2024-07-24
Payer: MEDICAID

## 2024-07-24 PROCEDURE — 96401 CHEMO ANTI-NEOPL SQ/IM: CPT

## 2024-07-24 RX ORDER — USTEKINUMAB 90 MG/ML
90 INJECTION, SOLUTION SUBCUTANEOUS
Refills: 0 | Status: COMPLETED | OUTPATIENT
Start: 2024-07-24

## 2024-07-24 RX ADMIN — USTEKINUMAB 0 MG/ML: 90 INJECTION, SOLUTION SUBCUTANEOUS at 00:00

## 2024-07-24 NOTE — ASSESSMENT
[FreeTextEntry1] : 28 y/o MALE w/ history of left-sided ulcerative colitis (diagnosed in 01/2022) started on Stelara on 1/31 induction therapy and chronically on prednisone 5mg q daily w/ treatment failures to both Infliximab and Vedolizumab, was transitioned to Rinvoq in February/March 2023, had clinical improvement with 45mg however decreased response at 30mg, and the drug was held to identify if would req. a combination approach. Restarted Rinvoq 30mg in combination with Stelara with clinical response. Patient here for follow up to discuss colonoscopy results which reveal slight progression of disease.   #Left sided ulcerative colitis (moderate) w/ treatment failures to Infliximab/Vedolizumab - Currently on Stelara, re-initiation Rinvoq at 30mg after slow decline since holding UPA.  - continue current combo treatment plan with Rinvoq + Stelara, pt has clinical response but not yet remission - reviewed colonoscopy in detail and with slight progression in disease, recommend increasing prednisone to 5 mg daily. consider increasing UPA to 45 mg daily or UC clinical trial in future.  - remain on Calcium/Vit D 3 while on pred  -March 2024 Fecal calpro ~ 4000; Fecal calpro (7/2023) 2240 -> (12/2023) 586 - fecal calprotectin is good tracker for pts inflammation, do not need to repeat right now since just had colonoscopy   - MRE on 10/2022 - suspect active mild sigmoid colitis and questionable terminal ileitis, which could represent backwash ileitis. Otherwise no signs of active small bowel inflammation. - can take Imodium at bed time to minimize 2x/wk early awakenings due to bowel urgency  BRIJESH - continue iron infusions with heme   #HCM  - Vaccines UTD per patient - immune to Hep A/B - will need DEXA scan once off prednisone  - Evidence of iron deficiency, however Hgb stable, advise iron rich diet; has been referred to Hematology - non smoker   f/u 3-6 months

## 2024-07-24 NOTE — REASON FOR VISIT
[Follow-up] : a follow-up of an existing diagnosis [Home] : at home, [unfilled] , at the time of the visit. [Medical Office: ([facility])___] : at the medical office located in  [Patient] : the patient [FreeTextEntry2] : Rosalio Camargo [FreeTextEntry1] : UC

## 2024-09-09 ENCOUNTER — RX RENEWAL (OUTPATIENT)
Age: 29
End: 2024-09-09

## 2024-09-09 RX ORDER — PREDNISONE 1 MG/1
1 TABLET ORAL
Qty: 105 | Refills: 0 | Status: ACTIVE | COMMUNITY
Start: 2024-09-09 | End: 1900-01-01

## 2024-09-17 ENCOUNTER — NON-APPOINTMENT (OUTPATIENT)
Age: 29
End: 2024-09-17

## 2024-09-18 ENCOUNTER — LABORATORY RESULT (OUTPATIENT)
Age: 29
End: 2024-09-18

## 2024-09-18 ENCOUNTER — APPOINTMENT (OUTPATIENT)
Dept: GASTROENTEROLOGY | Facility: CLINIC | Age: 29
End: 2024-09-18
Payer: MEDICAID

## 2024-09-18 VITALS
HEIGHT: 71 IN | BODY MASS INDEX: 21.08 KG/M2 | WEIGHT: 150.6 LBS | RESPIRATION RATE: 17 BRPM | OXYGEN SATURATION: 99 % | SYSTOLIC BLOOD PRESSURE: 112 MMHG | TEMPERATURE: 97.6 F | HEART RATE: 72 BPM | DIASTOLIC BLOOD PRESSURE: 68 MMHG

## 2024-09-18 DIAGNOSIS — K51.90 ULCERATIVE COLITIS, UNSPECIFIED, W/OUT COMPLICATIONS: ICD-10-CM

## 2024-09-18 PROCEDURE — G2211 COMPLEX E/M VISIT ADD ON: CPT | Mod: NC

## 2024-09-18 PROCEDURE — 99215 OFFICE O/P EST HI 40 MIN: CPT

## 2024-09-22 NOTE — HISTORY OF PRESENT ILLNESS
[FreeTextEntry1] : 29M with PMH of left-sided ulcerative colitis (diagnosed in 01/2022) started on Stelara on 1/31 induction therapy and chronically on prednisone 5mg q daily w/ treatment failures to both Infliximab and Vedolizumab, was transitioned to Rinvoq in February/March 2023, had clinical improvement with 45mg however decreased response at 30mg, and the drug was held to identify if would req. a combination approach. Restarted Rinvoq 30mg in combination with Stelara with clinical response but noted to have slight disease progress on colonoscopy in July 2024, so resumed prednisone 5 mg, now presenting for routine follow-up.   Patient states that currently he taking:  - Rinvoq 30 mg  - Stelara q8 weeks  - prednisone 5 mg PO daily (on 2 mg PO daily prior to colonoscopy)   Patient states that he is currently experiencing abdominal discomfort (like an achey pain) and feels about the same as he felt at last visit.  Is having bowel movements 4-5x per day (describes that when he was in good health had bowel movements 2x daily).  No bleeding with bowel movements. Has formed stools. Weight has been stable at 150 lbs. Denies nausea/vomiting. Only has urgency symptoms after coffee.  Most bothersome symptom currently is just a sensitivity sensation in his stomach. Also complains of fatigue but says has improved since starting Rinvoq.   Colonoscopy (7/11/24):  - Normal mucosa in TI, cecum, transverse colon and descending colon - Erythema and abnormal vascularity starting at 30 cm (Watt 1), 15 cm evidence of inflammation given presence of multiple inflammatory polyps. Pathology: 1. Colon, right; biopsy: Colonic mucosa without significant histopathologic findings. 2. Rectum, polyp; biopsy: Inflammatory polyp with hyperplastic features.  Most recent lab work:  - Feb 2024 and CBC/CMP within normal limits  - March 2024 fecal calprotectin 4210 (has been a good tracker for his disease)  - Feb 2024 CRP 21   MRE 10/2022:  - Suspect active mild sigmoid colitis and questionable terminal ileitis, which could represent backwash ileitis.  - Otherwise no signs of active small bowel inflammation.  Per notes, discussions of increasing UPA to 45 mg daily or UC clinical trial in future. Was advised to take Imodium at bedtime to minimize 2x/wk early awakenings due to bowel urgency.

## 2024-09-22 NOTE — ASSESSMENT
[FreeTextEntry1] : 29M with PMH of left-sided ulcerative colitis (diagnosed in 01/2022, currently on dual therapy with Stelara q8 and Rinvoq 30 mg PO daily, previously failed IFX and Vedo, found to have slightly worse inflammation on July 2024 colonoscopy so started on prednisone 5 mg PO daily, presenting for routine follow-up. Describes that adding prednisone has not made much of an impact on his symptoms. Feels well for the most part but still has some abdominal discomfort and increased stool frequency. Here with mother today.  #Left-sided ulcerative colitis:  - continue Stelara q8 weeks  - continue Rinvoq 30 mg PO daily  - taper off of prednisone as providing minimal benefit (decrease from 5 mg PO daily by 1 mg every 5 days)  - f/u CBC, CMP, CRP, and fecal calprotectin  - plan for repeat colonoscopy in 1 year (July 2025)  - Imodium at bedtime PRN to 2x/week to reduce urgency symptoms  - advised patient to counteract fatigue symptoms with more time socializing, being outside, participating in daily exercise, and other measures that will enhance overall quality of life   #BRIJESH:  - continue iron infusions with heme onc  - most recent Hb 14.3 (Feb 2024)   #HCM: - vaccines UTD per patient - immune to Hep A/B - plan for DEXA scan once off prednisone - non-smoker - follows with mental health professional

## 2024-09-30 ENCOUNTER — APPOINTMENT (OUTPATIENT)
Dept: GASTROENTEROLOGY | Facility: CLINIC | Age: 29
End: 2024-09-30
Payer: MEDICAID

## 2024-09-30 PROCEDURE — 96401 CHEMO ANTI-NEOPL SQ/IM: CPT

## 2024-09-30 RX ORDER — USTEKINUMAB 90 MG/ML
90 INJECTION, SOLUTION SUBCUTANEOUS
Refills: 0 | Status: COMPLETED | OUTPATIENT
Start: 2024-09-30

## 2024-09-30 RX ADMIN — USTEKINUMAB 0 MG/ML: 90 INJECTION, SOLUTION SUBCUTANEOUS at 00:00

## 2024-10-04 ENCOUNTER — TRANSCRIPTION ENCOUNTER (OUTPATIENT)
Age: 29
End: 2024-10-04

## 2024-11-18 ENCOUNTER — RX RENEWAL (OUTPATIENT)
Age: 29
End: 2024-11-18

## 2024-11-25 ENCOUNTER — APPOINTMENT (OUTPATIENT)
Dept: GASTROENTEROLOGY | Facility: CLINIC | Age: 29
End: 2024-11-25

## 2024-12-05 ENCOUNTER — APPOINTMENT (OUTPATIENT)
Dept: GASTROENTEROLOGY | Facility: CLINIC | Age: 29
End: 2024-12-05
Payer: MEDICAID

## 2024-12-05 VITALS
OXYGEN SATURATION: 99 % | WEIGHT: 151 LBS | DIASTOLIC BLOOD PRESSURE: 85 MMHG | BODY MASS INDEX: 21.62 KG/M2 | SYSTOLIC BLOOD PRESSURE: 138 MMHG | TEMPERATURE: 97.8 F | HEIGHT: 70 IN | HEART RATE: 89 BPM

## 2024-12-05 DIAGNOSIS — R06.00 DYSPNEA, UNSPECIFIED: ICD-10-CM

## 2024-12-05 PROCEDURE — G2211 COMPLEX E/M VISIT ADD ON: CPT | Mod: NC

## 2024-12-05 PROCEDURE — 99214 OFFICE O/P EST MOD 30 MIN: CPT

## 2024-12-06 LAB
ALBUMIN SERPL ELPH-MCNC: 4.8 G/DL
ALP BLD-CCNC: 66 U/L
ALT SERPL-CCNC: 30 U/L
ANION GAP SERPL CALC-SCNC: 13 MMOL/L
AST SERPL-CCNC: 20 U/L
BASOPHILS # BLD AUTO: 0.03 K/UL
BASOPHILS NFR BLD AUTO: 0.3 %
BILIRUB SERPL-MCNC: 0.3 MG/DL
BUN SERPL-MCNC: 13 MG/DL
CALCIUM SERPL-MCNC: 9.8 MG/DL
CHLORIDE SERPL-SCNC: 100 MMOL/L
CO2 SERPL-SCNC: 28 MMOL/L
CREAT SERPL-MCNC: 0.88 MG/DL
CRP SERPL-MCNC: 7 MG/L
EGFR: 120 ML/MIN/1.73M2
EOSINOPHIL # BLD AUTO: 0.08 K/UL
EOSINOPHIL NFR BLD AUTO: 0.8 %
GLUCOSE SERPL-MCNC: 93 MG/DL
HCT VFR BLD CALC: 47.1 %
HGB BLD-MCNC: 15.3 G/DL
IMM GRANULOCYTES NFR BLD AUTO: 1 %
LYMPHOCYTES # BLD AUTO: 2.14 K/UL
LYMPHOCYTES NFR BLD AUTO: 20.3 %
MAN DIFF?: NORMAL
MCHC RBC-ENTMCNC: 29 PG
MCHC RBC-ENTMCNC: 32.5 G/DL
MCV RBC AUTO: 89.4 FL
MONOCYTES # BLD AUTO: 1 K/UL
MONOCYTES NFR BLD AUTO: 9.5 %
NEUTROPHILS # BLD AUTO: 7.17 K/UL
NEUTROPHILS NFR BLD AUTO: 68.1 %
PLATELET # BLD AUTO: 270 K/UL
POTASSIUM SERPL-SCNC: 3.8 MMOL/L
PROT SERPL-MCNC: 7.5 G/DL
RBC # BLD: 5.27 M/UL
RBC # FLD: 12.6 %
SODIUM SERPL-SCNC: 141 MMOL/L
WBC # FLD AUTO: 10.52 K/UL

## 2024-12-18 ENCOUNTER — APPOINTMENT (OUTPATIENT)
Dept: PULMONOLOGY | Facility: CLINIC | Age: 29
End: 2024-12-18
Payer: MEDICAID

## 2024-12-18 ENCOUNTER — APPOINTMENT (OUTPATIENT)
Dept: PULMONOLOGY | Facility: CLINIC | Age: 29
End: 2024-12-18

## 2024-12-18 ENCOUNTER — OUTPATIENT (OUTPATIENT)
Dept: OUTPATIENT SERVICES | Facility: HOSPITAL | Age: 29
LOS: 1 days | End: 2024-12-18
Payer: MEDICAID

## 2024-12-18 VITALS
SYSTOLIC BLOOD PRESSURE: 116 MMHG | BODY MASS INDEX: 21.47 KG/M2 | HEIGHT: 70 IN | TEMPERATURE: 97.2 F | DIASTOLIC BLOOD PRESSURE: 77 MMHG | OXYGEN SATURATION: 99 % | HEART RATE: 78 BPM | WEIGHT: 150 LBS

## 2024-12-18 PROCEDURE — 94729 DIFFUSING CAPACITY: CPT

## 2024-12-18 PROCEDURE — 94727 GAS DIL/WSHOT DETER LNG VOL: CPT

## 2024-12-18 PROCEDURE — 71046 X-RAY EXAM CHEST 2 VIEWS: CPT | Mod: 26

## 2024-12-18 PROCEDURE — ZZZZZ: CPT

## 2024-12-18 PROCEDURE — 99204 OFFICE O/P NEW MOD 45 MIN: CPT | Mod: 25

## 2024-12-18 PROCEDURE — 71046 X-RAY EXAM CHEST 2 VIEWS: CPT

## 2024-12-18 PROCEDURE — 94010 BREATHING CAPACITY TEST: CPT

## 2024-12-20 ENCOUNTER — NON-APPOINTMENT (OUTPATIENT)
Age: 29
End: 2024-12-20

## 2025-01-03 ENCOUNTER — APPOINTMENT (OUTPATIENT)
Dept: CT IMAGING | Facility: CLINIC | Age: 30
End: 2025-01-03

## 2025-01-07 ENCOUNTER — RX RENEWAL (OUTPATIENT)
Age: 30
End: 2025-01-07

## 2025-01-27 ENCOUNTER — APPOINTMENT (OUTPATIENT)
Dept: GASTROENTEROLOGY | Facility: CLINIC | Age: 30
End: 2025-01-27
Payer: MEDICAID

## 2025-01-27 PROCEDURE — 96401 CHEMO ANTI-NEOPL SQ/IM: CPT

## 2025-01-27 RX ORDER — USTEKINUMAB 90 MG/ML
90 INJECTION, SOLUTION SUBCUTANEOUS
Refills: 0 | Status: COMPLETED | OUTPATIENT
Start: 2025-01-27

## 2025-01-27 RX ADMIN — USTEKINUMAB 0 MG/ML: 90 INJECTION, SOLUTION SUBCUTANEOUS at 00:00

## 2025-02-24 ENCOUNTER — RX RENEWAL (OUTPATIENT)
Age: 30
End: 2025-02-24

## 2025-03-24 ENCOUNTER — RX RENEWAL (OUTPATIENT)
Age: 30
End: 2025-03-24

## 2025-03-24 ENCOUNTER — APPOINTMENT (OUTPATIENT)
Dept: GASTROENTEROLOGY | Facility: CLINIC | Age: 30
End: 2025-03-24

## 2025-03-27 ENCOUNTER — APPOINTMENT (OUTPATIENT)
Dept: GASTROENTEROLOGY | Facility: CLINIC | Age: 30
End: 2025-03-27
Payer: MEDICAID

## 2025-03-27 PROCEDURE — 96401 CHEMO ANTI-NEOPL SQ/IM: CPT

## 2025-04-29 ENCOUNTER — TRANSCRIPTION ENCOUNTER (OUTPATIENT)
Age: 30
End: 2025-04-29

## 2025-05-19 ENCOUNTER — APPOINTMENT (OUTPATIENT)
Dept: GASTROENTEROLOGY | Facility: CLINIC | Age: 30
End: 2025-05-19
Payer: MEDICAID

## 2025-05-19 ENCOUNTER — NON-APPOINTMENT (OUTPATIENT)
Age: 30
End: 2025-05-19

## 2025-05-19 VITALS
TEMPERATURE: 97.9 F | SYSTOLIC BLOOD PRESSURE: 125 MMHG | OXYGEN SATURATION: 98 % | DIASTOLIC BLOOD PRESSURE: 77 MMHG | BODY MASS INDEX: 21.76 KG/M2 | WEIGHT: 152 LBS | HEART RATE: 88 BPM | HEIGHT: 70 IN

## 2025-05-19 PROCEDURE — 96401 CHEMO ANTI-NEOPL SQ/IM: CPT

## 2025-05-19 PROCEDURE — 99215 OFFICE O/P EST HI 40 MIN: CPT | Mod: 25

## 2025-05-19 RX ORDER — USTEKINUMAB 90 MG/ML
90 INJECTION, SOLUTION SUBCUTANEOUS
Refills: 0 | Status: COMPLETED | OUTPATIENT
Start: 2025-05-19

## 2025-05-19 RX ADMIN — USTEKINUMAB 0 MG/ML: 90 INJECTION, SOLUTION SUBCUTANEOUS at 00:00

## 2025-05-20 LAB
25(OH)D3 SERPL-MCNC: 32.1 NG/ML
ALBUMIN SERPL ELPH-MCNC: 4.8 G/DL
ALP BLD-CCNC: 66 U/L
ALT SERPL-CCNC: 25 U/L
ANION GAP SERPL CALC-SCNC: 15 MMOL/L
AST SERPL-CCNC: 18 U/L
BASOPHILS # BLD AUTO: 0.02 K/UL
BASOPHILS NFR BLD AUTO: 0.2 %
BILIRUB SERPL-MCNC: 0.4 MG/DL
BUN SERPL-MCNC: 13 MG/DL
CALCIUM SERPL-MCNC: 9.4 MG/DL
CHLORIDE SERPL-SCNC: 101 MMOL/L
CO2 SERPL-SCNC: 25 MMOL/L
CREAT SERPL-MCNC: 1.1 MG/DL
CRP SERPL-MCNC: 8 MG/L
EGFRCR SERPLBLD CKD-EPI 2021: 93 ML/MIN/1.73M2
EOSINOPHIL # BLD AUTO: 0.05 K/UL
EOSINOPHIL NFR BLD AUTO: 0.6 %
FOLATE SERPL-MCNC: 8.5 NG/ML
GLUCOSE SERPL-MCNC: 91 MG/DL
HCT VFR BLD CALC: 47.1 %
HGB BLD-MCNC: 15.3 G/DL
IMM GRANULOCYTES NFR BLD AUTO: 0.6 %
IRON SATN MFR SERPL: 23 %
IRON SERPL-MCNC: 87 UG/DL
LYMPHOCYTES # BLD AUTO: 1.9 K/UL
LYMPHOCYTES NFR BLD AUTO: 22.8 %
MAN DIFF?: NORMAL
MCHC RBC-ENTMCNC: 28.7 PG
MCHC RBC-ENTMCNC: 32.5 G/DL
MCV RBC AUTO: 88.4 FL
MONOCYTES # BLD AUTO: 0.96 K/UL
MONOCYTES NFR BLD AUTO: 11.5 %
NEUTROPHILS # BLD AUTO: 5.34 K/UL
NEUTROPHILS NFR BLD AUTO: 64.3 %
PLATELET # BLD AUTO: 266 K/UL
POTASSIUM SERPL-SCNC: 4 MMOL/L
PROT SERPL-MCNC: 7.2 G/DL
RBC # BLD: 5.33 M/UL
RBC # FLD: 13.1 %
SODIUM SERPL-SCNC: 141 MMOL/L
TIBC SERPL-MCNC: 377 UG/DL
UIBC SERPL-MCNC: 290 UG/DL
VIT B12 SERPL-MCNC: 390 PG/ML
WBC # FLD AUTO: 8.32 K/UL

## 2025-05-21 ENCOUNTER — APPOINTMENT (OUTPATIENT)
Dept: GASTROENTEROLOGY | Facility: CLINIC | Age: 30
End: 2025-05-21

## 2025-07-14 ENCOUNTER — APPOINTMENT (OUTPATIENT)
Dept: GASTROENTEROLOGY | Facility: CLINIC | Age: 30
End: 2025-07-14
Payer: MEDICAID

## 2025-07-14 PROBLEM — E53.8 LOW SERUM VITAMIN B12: Status: ACTIVE | Noted: 2025-07-14

## 2025-07-14 PROCEDURE — 96401 CHEMO ANTI-NEOPL SQ/IM: CPT

## 2025-07-14 RX ORDER — CYANOCOBALAMIN 500 UG/1
500 SPRAY NASAL
Qty: 1 | Refills: 6 | Status: ACTIVE | COMMUNITY
Start: 2025-07-14 | End: 1900-01-01

## 2025-07-14 RX ORDER — USTEKINUMAB 90 MG/ML
90 INJECTION, SOLUTION SUBCUTANEOUS
Refills: 0 | Status: COMPLETED | OUTPATIENT
Start: 2025-07-14

## 2025-07-14 RX ADMIN — USTEKINUMAB 0 MG/ML: 90 INJECTION, SOLUTION SUBCUTANEOUS at 00:00

## 2025-08-05 ENCOUNTER — RESULT REVIEW (OUTPATIENT)
Age: 30
End: 2025-08-05

## 2025-08-05 ENCOUNTER — APPOINTMENT (OUTPATIENT)
Dept: GASTROENTEROLOGY | Facility: HOSPITAL | Age: 30
End: 2025-08-05

## 2025-08-05 ENCOUNTER — TRANSCRIPTION ENCOUNTER (OUTPATIENT)
Age: 30
End: 2025-08-05

## 2025-08-05 ENCOUNTER — OUTPATIENT (OUTPATIENT)
Dept: OUTPATIENT SERVICES | Facility: HOSPITAL | Age: 30
LOS: 1 days | End: 2025-08-05
Payer: MEDICAID

## 2025-08-05 VITALS
DIASTOLIC BLOOD PRESSURE: 70 MMHG | HEIGHT: 71 IN | RESPIRATION RATE: 18 BRPM | OXYGEN SATURATION: 100 % | WEIGHT: 149.91 LBS | SYSTOLIC BLOOD PRESSURE: 136 MMHG | HEART RATE: 88 BPM | TEMPERATURE: 98 F

## 2025-08-05 PROCEDURE — 88305 TISSUE EXAM BY PATHOLOGIST: CPT

## 2025-08-05 PROCEDURE — 45385 COLONOSCOPY W/LESION REMOVAL: CPT

## 2025-08-05 PROCEDURE — 88305 TISSUE EXAM BY PATHOLOGIST: CPT | Mod: 26

## 2025-08-05 DEVICE — CLIP RESOLUTION 360 ULTRA 235CM 20/BX: Type: IMPLANTABLE DEVICE | Status: FUNCTIONAL

## 2025-08-09 DIAGNOSIS — K51.40 INFLAMMATORY POLYPS OF COLON WITHOUT COMPLICATIONS: ICD-10-CM

## 2025-08-09 DIAGNOSIS — K51.90 ULCERATIVE COLITIS, UNSPECIFIED, WITHOUT COMPLICATIONS: ICD-10-CM

## 2025-08-09 DIAGNOSIS — F32.A DEPRESSION, UNSPECIFIED: ICD-10-CM

## 2025-09-03 ENCOUNTER — APPOINTMENT (OUTPATIENT)
Dept: GASTROENTEROLOGY | Facility: CLINIC | Age: 30
End: 2025-09-03

## 2025-09-03 VITALS
BODY MASS INDEX: 22.62 KG/M2 | HEART RATE: 91 BPM | WEIGHT: 158 LBS | HEIGHT: 70 IN | RESPIRATION RATE: 16 BRPM | OXYGEN SATURATION: 98 % | DIASTOLIC BLOOD PRESSURE: 70 MMHG | TEMPERATURE: 95.5 F | SYSTOLIC BLOOD PRESSURE: 110 MMHG

## 2025-09-03 DIAGNOSIS — K51.90 ULCERATIVE COLITIS, UNSPECIFIED, W/OUT COMPLICATIONS: ICD-10-CM

## 2025-09-03 PROCEDURE — 99214 OFFICE O/P EST MOD 30 MIN: CPT

## 2025-09-03 PROCEDURE — G2211 COMPLEX E/M VISIT ADD ON: CPT | Mod: NC

## 2025-09-06 LAB
25(OH)D3 SERPL-MCNC: 39.6 NG/ML
ALBUMIN SERPL ELPH-MCNC: 4.9 G/DL
ALP BLD-CCNC: 67 U/L
ALT SERPL-CCNC: 32 U/L
ANION GAP SERPL CALC-SCNC: 14 MMOL/L
AST SERPL-CCNC: 37 U/L
BILIRUB SERPL-MCNC: 0.4 MG/DL
BUN SERPL-MCNC: 18 MG/DL
CALCIUM SERPL-MCNC: 9.7 MG/DL
CHLORIDE SERPL-SCNC: 102 MMOL/L
CO2 SERPL-SCNC: 24 MMOL/L
CREAT SERPL-MCNC: 0.96 MG/DL
CRP SERPL-MCNC: 3 MG/L
EGFRCR SERPLBLD CKD-EPI 2021: 110 ML/MIN/1.73M2
FERRITIN SERPL-MCNC: 189 NG/ML
GLUCOSE SERPL-MCNC: 84 MG/DL
HCT VFR BLD CALC: 48 %
HGB BLD-MCNC: 15.5 G/DL
IRON SATN MFR SERPL: 27 %
IRON SERPL-MCNC: 102 UG/DL
MCHC RBC-ENTMCNC: 28.4 PG
MCHC RBC-ENTMCNC: 32.3 G/DL
MCV RBC AUTO: 87.9 FL
PLATELET # BLD AUTO: 288 K/UL
POTASSIUM SERPL-SCNC: 4.5 MMOL/L
PROT SERPL-MCNC: 7.3 G/DL
RBC # BLD: 5.46 M/UL
RBC # FLD: 13.1 %
SODIUM SERPL-SCNC: 141 MMOL/L
TIBC SERPL-MCNC: 383 UG/DL
UIBC SERPL-MCNC: 281 UG/DL
VIT B12 SERPL-MCNC: 611 PG/ML
WBC # FLD AUTO: 9.07 K/UL

## 2025-09-08 ENCOUNTER — NON-APPOINTMENT (OUTPATIENT)
Age: 30
End: 2025-09-08

## 2025-09-15 ENCOUNTER — APPOINTMENT (OUTPATIENT)
Dept: GASTROENTEROLOGY | Facility: CLINIC | Age: 30
End: 2025-09-15

## 2025-09-18 ENCOUNTER — APPOINTMENT (OUTPATIENT)
Dept: GASTROENTEROLOGY | Facility: CLINIC | Age: 30
End: 2025-09-18
Payer: MEDICAID

## 2025-09-18 PROCEDURE — 96401 CHEMO ANTI-NEOPL SQ/IM: CPT

## 2025-09-18 RX ORDER — USTEKINUMAB 90 MG/ML
90 INJECTION, SOLUTION SUBCUTANEOUS
Refills: 0 | Status: COMPLETED | OUTPATIENT
Start: 2025-09-18

## 2025-09-18 RX ADMIN — USTEKINUMAB 0 MG/ML: 90 INJECTION, SOLUTION SUBCUTANEOUS at 00:00

## (undated) DEVICE — SNARE CAPTIVATOR II 15MM

## (undated) DEVICE — FORCEP RADIAL JAW 4 W NDL 2.2MM 2.8MM 240CM ORANGE DISP

## (undated) DEVICE — ELCTR GROUNDING PAD ADULT COVIDIEN

## (undated) DEVICE — NET RETRIEVAL RESCUENET 30MM